# Patient Record
Sex: MALE | Race: WHITE | NOT HISPANIC OR LATINO | Employment: FULL TIME | URBAN - METROPOLITAN AREA
[De-identification: names, ages, dates, MRNs, and addresses within clinical notes are randomized per-mention and may not be internally consistent; named-entity substitution may affect disease eponyms.]

---

## 2017-04-06 ENCOUNTER — HOSPITAL ENCOUNTER (EMERGENCY)
Facility: HOSPITAL | Age: 27
Discharge: HOME | End: 2017-04-06
Attending: EMERGENCY MEDICINE | Admitting: EMERGENCY MEDICINE
Payer: COMMERCIAL

## 2017-04-06 VITALS
OXYGEN SATURATION: 98 % | RESPIRATION RATE: 18 BRPM | TEMPERATURE: 97.9 F | DIASTOLIC BLOOD PRESSURE: 72 MMHG | SYSTOLIC BLOOD PRESSURE: 124 MMHG | WEIGHT: 183 LBS | HEART RATE: 74 BPM

## 2017-04-06 DIAGNOSIS — S05.02XA LEFT CORNEAL ABRASION, INITIAL ENCOUNTER: ICD-10-CM

## 2017-04-06 DIAGNOSIS — H16.133 UV KERATITIS, BILATERAL: Primary | ICD-10-CM

## 2017-04-06 PROCEDURE — 99283 EMERGENCY DEPT VISIT LOW MDM: CPT

## 2017-04-06 RX ORDER — PROPARACAINE HYDROCHLORIDE 5 MG/ML
1 SOLUTION/ DROPS OPHTHALMIC ONCE
Status: DISCONTINUED | OUTPATIENT
Start: 2017-04-06 | End: 2017-04-06 | Stop reason: HOSPADM

## 2017-04-06 RX ORDER — POLYMYXIN B SULFATE AND TRIMETHOPRIM 1; 10000 MG/ML; [USP'U]/ML
1 SOLUTION OPHTHALMIC EVERY 6 HOURS
Qty: 10 ML | Refills: 0 | Status: SHIPPED | OUTPATIENT
Start: 2017-04-06 | End: 2017-05-06

## 2017-09-21 ENCOUNTER — OFFICE VISIT (OUTPATIENT)
Dept: URGENT CARE | Age: 27
End: 2017-09-21
Payer: COMMERCIAL

## 2017-09-21 ENCOUNTER — TRANSCRIBE ORDERS (OUTPATIENT)
Dept: URGENT CARE | Age: 27
End: 2017-09-21

## 2017-09-21 ENCOUNTER — APPOINTMENT (OUTPATIENT)
Dept: RADIOLOGY | Age: 27
End: 2017-09-21
Attending: PHYSICIAN ASSISTANT
Payer: COMMERCIAL

## 2017-09-21 DIAGNOSIS — S69.92XA UNSPECIFIED INJURY OF LEFT WRIST, HAND AND FINGER(S), INITIAL ENCOUNTER: ICD-10-CM

## 2017-09-21 PROCEDURE — 73140 X-RAY EXAM OF FINGER(S): CPT

## 2017-09-21 PROCEDURE — 29130 APPL FINGER SPLINT STATIC: CPT | Performed by: FAMILY MEDICINE

## 2017-09-21 PROCEDURE — G0382 LEV 3 HOSP TYPE B ED VISIT: HCPCS | Performed by: FAMILY MEDICINE

## 2018-08-09 ENCOUNTER — HOSPITAL ENCOUNTER (OUTPATIENT)
Dept: RADIOLOGY | Facility: HOSPITAL | Age: 28
Discharge: HOME | End: 2018-08-09
Payer: COMMERCIAL

## 2018-08-09 VITALS — BODY MASS INDEX: 25.9 KG/M2 | HEIGHT: 71 IN | WEIGHT: 185 LBS

## 2018-08-09 DIAGNOSIS — M25.531 PAIN IN RIGHT WRIST: Primary | ICD-10-CM

## 2018-08-09 DIAGNOSIS — M25.531 PAIN IN RIGHT WRIST: ICD-10-CM

## 2018-08-09 PROCEDURE — 99203 OFFICE O/P NEW LOW 30 MIN: CPT | Performed by: PHYSICIAN ASSISTANT

## 2018-08-09 PROCEDURE — 73110 X-RAY EXAM OF WRIST: CPT

## 2018-08-09 RX ORDER — IBUPROFEN 200 MG
TABLET ORAL EVERY 6 HOURS PRN
COMMUNITY
End: 2021-03-11

## 2018-08-09 NOTE — PROGRESS NOTES
Assessment/Plan   Diagnoses and all orders for this visit:    Pain in right wrist  -     XR wrist 3+ vw right; Future  -     MRI wrist right wo contrast; Future    Other orders  -     ibuprofen (MOTRIN) 200 mg tablet; Take by mouth every 6 (six) hours as needed for mild pain    Wear brace for activity  Suspect acute TFCC tear - MRI, follow up with Dr Johnny Schmitt / Julio Perales   Patient ID: Fuad Lopez is a 33 y o  male  There were no vitals filed for this visit  34yo male comes in for an evaluation of his right wrist   He was originally injured a week ago when he was lifting heavy pillars from his wedding  He felt a sudden pain in the volar wrist that radiated up to the forearm  He is unable to make a full fist   The pain is dull in character, mild in severity, pain does not radiate and is not associated with numbness  He c/o volar & lateral clicking sensations with wrist movement  The following portions of the patient's history were reviewed and updated as appropriate: allergies, current medications, past family history, past medical history, past social history, past surgical history and problem list     Review of Systems  Ortho Exam  History reviewed  No pertinent past medical history  Past Surgical History:   Procedure Laterality Date    ANKLE SURGERY Bilateral     KNEE SURGERY Right      History reviewed  No pertinent family history  Social History     Occupational History    Not on file  Social History Main Topics    Smoking status: Never Smoker    Smokeless tobacco: Never Used    Alcohol use Yes      Comment: social    Drug use: No    Sexual activity: Not on file       Review of Systems   Constitutional: Negative  HENT: Negative  Eyes: Negative  Respiratory: Negative  Cardiovascular: Negative  Gastrointestinal: Negative  Endocrine: Negative  Genitourinary: Negative  Musculoskeletal: As below      Allergic/Immunologic: Negative  Neurological: Negative  Hematological: Negative  Psychiatric/Behavioral: Negative  Objective   Physical Exam    · Constitutional: Awake, Alert, Oriented  · Eyes: EOMI  · Psych: Mood and affect appropriate  · Heart: regular rate and rhythm  · Lungs: No audible wheezing  · Abdomen: soft  · Lymph: no lymphedema   right wrist:  - Appearance   Swelling: mild volar  No ecchymosis  - Palpation  o No tenderness to palpation of distal radius,scaphoid, lunate, hamate, ulnar wrist, dorsal wrist, palmar wrist, thenar eminence, hypothenar eminence, or hand  ++ tenderness over the ulnar styloid and TFCC  - ROM  o palmar flexion 30, dorsiflexion 30, pronation 90, supination 90  - Motor  o  5/5, wrist extension 5/5, and wrist flexion 5/5, interossi 5/5  - Special Tests  o normal sensation of hand and arm  - NVI distally    I have personally reviewed pertinent films in PACS and my interpretation is no fracture

## 2018-08-10 DIAGNOSIS — M25.531 WRIST PAIN, RIGHT: Primary | ICD-10-CM

## 2018-08-15 ENCOUNTER — HOSPITAL ENCOUNTER (OUTPATIENT)
Dept: RADIOLOGY | Age: 28
Discharge: HOME | End: 2018-08-15
Payer: COMMERCIAL

## 2018-08-15 ENCOUNTER — APPOINTMENT (OUTPATIENT)
Dept: RADIOLOGY | Age: 28
End: 2018-08-15
Payer: COMMERCIAL

## 2018-08-15 DIAGNOSIS — M25.531 PAIN IN RIGHT WRIST: ICD-10-CM

## 2018-08-15 DIAGNOSIS — M25.531 WRIST PAIN, RIGHT: ICD-10-CM

## 2018-08-15 PROCEDURE — 70030 X-RAY EYE FOR FOREIGN BODY: CPT

## 2018-08-15 PROCEDURE — 73221 MRI JOINT UPR EXTREM W/O DYE: CPT

## 2018-08-17 ENCOUNTER — OFFICE VISIT (OUTPATIENT)
Dept: OCCUPATIONAL THERAPY | Facility: HOSPITAL | Age: 28
End: 2018-08-17
Attending: ORTHOPAEDIC SURGERY
Payer: COMMERCIAL

## 2018-08-17 VITALS
HEIGHT: 71 IN | DIASTOLIC BLOOD PRESSURE: 75 MMHG | BODY MASS INDEX: 26.4 KG/M2 | HEART RATE: 76 BPM | WEIGHT: 188.6 LBS | SYSTOLIC BLOOD PRESSURE: 122 MMHG

## 2018-08-17 DIAGNOSIS — R22.31 MASS OF RIGHT WRIST: ICD-10-CM

## 2018-08-17 DIAGNOSIS — R22.31 MASS OF RIGHT WRIST: Primary | ICD-10-CM

## 2018-08-17 PROCEDURE — G8992 OTHER PT/OT  D/C STATUS: HCPCS | Performed by: OCCUPATIONAL THERAPIST

## 2018-08-17 PROCEDURE — G8990 OTHER PT/OT CURRENT STATUS: HCPCS | Performed by: OCCUPATIONAL THERAPIST

## 2018-08-17 PROCEDURE — 99213 OFFICE O/P EST LOW 20 MIN: CPT | Performed by: ORTHOPAEDIC SURGERY

## 2018-08-17 PROCEDURE — G8991 OTHER PT/OT GOAL STATUS: HCPCS | Performed by: OCCUPATIONAL THERAPIST

## 2018-08-17 PROCEDURE — L3808 WHFO, RIGID W/O JOINTS: HCPCS | Performed by: OCCUPATIONAL THERAPIST

## 2018-08-17 NOTE — LETTER
August 17, 2018     Patient: Jc Arrington   YOB: 1990   Date of Visit: 8/17/2018       To Whom it May Concern:    Jc Arrington is under my professional care  He was seen in my office on 8/17/2018  He may return to work with limitations patient can return to work without use of his right hand       If you have any questions or concerns, please don't hesitate to call           Sincerely,          Stan Aguayo MD        CC: No Recipients

## 2018-08-17 NOTE — PROGRESS NOTES
ASSESSMENT/PLAN:    Assessment:   Mass of Right wrist within the carpal tunnel    Plan: We will order the patient an MRI with contrast to evaluate the mass and a dynamic ultrasound to evaluate the mass as well  The patient will meet with the therapist today with an intrinsic plus splint for his right hand  He may return to work without the use of his right hand for approximately the next 3 weeks  Follow Up: After Testing    To Do Next Visit:       General Discussions:       Operative Discussions:         _____________________________________________________  CHIEF COMPLAINT:  Chief Complaint   Patient presents with    Right Wrist - Pain         SUBJECTIVE:  Austin Ortiz is a 33y o  year old male who presents with Pain  Moderate  Constant  Sharp, Burning and Aching, tingling and weakness to the right wrist   This started  2 week(s) ago as Due to a personal injury  Pt states that he went to  something heavy from his wedding and felt a pop and burning sensation to the volar aspect of his right wrist  Pt states that now whenever he makes a fist he feels a popping or clicking over the carpal tunnel  Pt is a  and states that he is has been at work this whole time without restrictions  Radiation: Yes to the  forearm  Previous Treatments: NSAIDs and ice with only partial relief  Associated symptoms: No Complaints    PAST MEDICAL HISTORY:  No past medical history on file  PAST SURGICAL HISTORY:  Past Surgical History:   Procedure Laterality Date    ANKLE SURGERY Bilateral     KNEE SURGERY Right        FAMILY HISTORY:  No family history on file      SOCIAL HISTORY:  Social History   Substance Use Topics    Smoking status: Never Smoker    Smokeless tobacco: Never Used    Alcohol use Yes      Comment: social       MEDICATIONS:    Current Outpatient Prescriptions:     ibuprofen (MOTRIN) 200 mg tablet, Take by mouth every 6 (six) hours as needed for mild pain, Disp: , Rfl:     ALLERGIES:  No Known Allergies    REVIEW OF SYSTEMS:  Pertinent items are noted in HPI      LABS:  HgA1c: No results found for: HGBA1C  BMP: No results found for: GLUCOSE, CALCIUM, NA, K, CO2, CL, BUN, CREATININE      _____________________________________________________  PHYSICAL EXAMINATION:  General: well developed and well nourished, alert, oriented times 3 and appears comfortable  Psychiatric: Normal  HEENT: Trachea Midline, No torticollis  Cardiovascular: No discernable arrhythmia  Pulmonary: No wheezing or stridor  Skin: ecchymosis to the ulnar aspect of right forearm   Neurovascular: Sensation Intact to the Median, Ulnar, Radial Nerve, Motor Intact to the Median, Ulnar, Radial Nerve and Pulses Intact    MUSCULOSKELETAL EXAMINATION:  RIGHT SIDE:  Wrist:  full ROM, no instability, subluxation of flexor tendons are the median nerve, audible popping, positive tinels    _____________________________________________________  STUDIES REVIEWED:  Images were reviewd in PACS: MRI of right wrist shows a mass within the carpal tunnel      PROCEDURES PERFORMED:  Procedures  No Procedures performed today

## 2018-08-17 NOTE — PROGRESS NOTES
Splint     Diagnosis:   1  Mass of right wrist  Ambulatory referral to PT/OT hand therapy   2Indication: pain    Location: Right  wrist, hand, thumb, index finger, long finger, ring finger and small finger  Supplies: Custom Fit Orthotic  Splint type: Ulnar Gutter Hand Based  Wearing Schedule: Remove for hygiene only  Describe Position: intrinsic plus    Precautions: Universal (skin contact/breakdown)    Patient or Caregiver expresses understanding of wearing Schedule and Precautions? Yes  Patient or Caregiver able to don/doff orthotic independently? Yes    Written orders provided to patient?  Yes  Orders Obtained: Written  Orders Obtained from: Dr Kenn Carter    Return for evaluation and treatment No

## 2018-08-20 ENCOUNTER — APPOINTMENT (OUTPATIENT)
Dept: LAB | Facility: HOSPITAL | Age: 28
End: 2018-08-20
Payer: COMMERCIAL

## 2018-08-20 ENCOUNTER — HOSPITAL ENCOUNTER (OUTPATIENT)
Dept: RADIOLOGY | Facility: HOSPITAL | Age: 28
Discharge: HOME | End: 2018-08-20
Attending: ORTHOPAEDIC SURGERY
Payer: COMMERCIAL

## 2018-08-20 ENCOUNTER — TRANSCRIBE ORDERS (OUTPATIENT)
Dept: RADIOLOGY | Facility: HOSPITAL | Age: 28
End: 2018-08-20

## 2018-08-20 ENCOUNTER — HOSPITAL ENCOUNTER (OUTPATIENT)
Dept: RADIOLOGY | Facility: HOSPITAL | Age: 28
Discharge: HOME | End: 2018-08-20
Attending: ORTHOPAEDIC SURGERY | Admitting: RADIOLOGY
Payer: COMMERCIAL

## 2018-08-20 DIAGNOSIS — Z00.8 HEALTH EXAMINATION IN POPULATION SURVEY: Primary | ICD-10-CM

## 2018-08-20 DIAGNOSIS — R22.31 MASS OF RIGHT WRIST: ICD-10-CM

## 2018-08-20 DIAGNOSIS — Z00.8 HEALTH EXAMINATION IN POPULATION SURVEY: ICD-10-CM

## 2018-08-20 LAB
CHOLEST SERPL-MCNC: 163 MG/DL (ref 50–200)
EST. AVERAGE GLUCOSE BLD GHB EST-MCNC: 103 MG/DL
HBA1C MFR BLD: 5.2 % (ref 4.2–6.3)
HDLC SERPL-MCNC: 65 MG/DL (ref 40–60)
LDLC SERPL CALC-MCNC: 86 MG/DL (ref 0–100)
NONHDLC SERPL-MCNC: 98 MG/DL
TRIGL SERPL-MCNC: 62 MG/DL

## 2018-08-20 PROCEDURE — A9585 GADOBUTROL INJECTION: HCPCS | Performed by: ORTHOPAEDIC SURGERY

## 2018-08-20 PROCEDURE — 80061 LIPID PANEL: CPT

## 2018-08-20 PROCEDURE — 73223 MRI JOINT UPR EXTR W/O&W/DYE: CPT

## 2018-08-20 PROCEDURE — 36415 COLL VENOUS BLD VENIPUNCTURE: CPT

## 2018-08-20 PROCEDURE — 76882 US LMTD JT/FCL EVL NVASC XTR: CPT

## 2018-08-20 PROCEDURE — 83036 HEMOGLOBIN GLYCOSYLATED A1C: CPT

## 2018-08-20 RX ADMIN — GADOBUTROL 10 ML: 604.72 INJECTION INTRAVENOUS at 12:46

## 2018-09-07 ENCOUNTER — OFFICE VISIT (OUTPATIENT)
Dept: OBGYN CLINIC | Facility: HOSPITAL | Age: 28
End: 2018-09-07
Payer: COMMERCIAL

## 2018-09-07 ENCOUNTER — DOCUMENTATION (OUTPATIENT)
Dept: OBGYN CLINIC | Facility: HOSPITAL | Age: 28
End: 2018-09-07

## 2018-09-07 VITALS
HEART RATE: 84 BPM | HEIGHT: 71 IN | WEIGHT: 190.8 LBS | SYSTOLIC BLOOD PRESSURE: 118 MMHG | DIASTOLIC BLOOD PRESSURE: 72 MMHG | BODY MASS INDEX: 26.71 KG/M2

## 2018-09-07 DIAGNOSIS — R22.31 MASS OF RIGHT WRIST: Primary | ICD-10-CM

## 2018-09-07 PROCEDURE — 99214 OFFICE O/P EST MOD 30 MIN: CPT | Performed by: ORTHOPAEDIC SURGERY

## 2018-09-07 NOTE — H&P
ASSESSMENT/PLAN:    Assessment:   Right wrist mass in carpal tunnel    Plan:   Surgery for mass excision from right wrist   Patient will be done under a open incision with regards to his right hand under general anesthesia  To Do Next Visit:  Remove sutures    General Discussions:       Operative Discussions:  Standard Consent: The risks and benefits of the procedure were explained to the patient, which include, but are not limited to: Bleeding, infection, recurrence, pain, scar, damage to tendons, damage to nerves, and damage to blood vessels, failure to give desired results and complications related to anesthesia  These risks, along with alternative conservative treatment options, and postoperative protocols were voiced back and understood by the patient  All questions were answered to the patient's satisfaction  The patient agrees to comply with a standard postoperative protocol, and is willing to proceed  Education was provided via written and auditory forms  There were no barriers to learning  Written handouts regarding wound care, incision and scar care, and general preoperative information was provided to the patient  Prior to surgery, the patient may be requested to stop all anti-inflammatory medications  Prophylactic aspirin, Plavix, and Coumadin may be allowed to be continued  Medications including vitamin E , ginkgo, and fish oil are requested to be stopped approximately one week prior to surgery  Hypertensive medications and beta blockers, if taken, should be continued  _____________________________________________________  CHIEF COMPLAINT:  Chief Complaint   Patient presents with    Right Wrist - Follow-up         SUBJECTIVE:  Yash Farnsworth is a 33y o  year old male who presents to the office for follow up of right wrist pain  He is here today to review the results of his right wrist MRI   He has been wearing a removable wrist splint which has been beneficial  When not wearing his splint, he experiences popping and clicking with associated pain  He is accompanied by his wife today in the office  PAST MEDICAL HISTORY:  History reviewed  No pertinent past medical history  PAST SURGICAL HISTORY:  Past Surgical History:   Procedure Laterality Date    ANKLE SURGERY Bilateral     KNEE SURGERY Right        FAMILY HISTORY:  Family History   Problem Relation Age of Onset    No Known Problems Mother     No Known Problems Father        SOCIAL HISTORY:  Social History   Substance Use Topics    Smoking status: Never Smoker    Smokeless tobacco: Never Used    Alcohol use Yes      Comment: social       MEDICATIONS:    Current Outpatient Prescriptions:     Acetaminophen (TYLENOL PO), Take by mouth, Disp: , Rfl:     ibuprofen (MOTRIN) 200 mg tablet, Take by mouth every 6 (six) hours as needed for mild pain, Disp: , Rfl:     ALLERGIES:  No Known Allergies    REVIEW OF SYSTEMS:  Pertinent items are noted in HPI  A comprehensive review of systems was negative      LABS:  HgA1c:   Lab Results   Component Value Date    HGBA1C 5 2 08/20/2018     BMP: No results found for: GLUCOSE, CALCIUM, NA, K, CO2, CL, BUN, CREATININE    _____________________________________________________  PHYSICAL EXAMINATION:  General: well developed and well nourished, alert, oriented times 3 and appears comfortable  Psychiatric: Normal  HEENT: Trachea Midline, No torticollis  Cardiovascular: No discernable arrhythmia  Pulmonary: No wheezing or stridor  Skin: No masses, erthema, lacerations, fluctation, ulcerations  Neurovascular: Sensation Intact to the Median, Ulnar, Radial Nerve, Motor Intact to the Median, Ulnar, Radial Nerve and Pulses Intact    MUSCULOSKELETAL EXAMINATION:  Right wrist:  Tinel's positive at wrist  Snapping flexor tendons  Abductor brevis intact    _____________________________________________________  STUDIES REVIEWED:  Images were reviewd in PACS:  MRI of right wrist shows mass in distal carpal tunnel  US of right wrist shows 6 mm mass in carpal tunnel surrounded by flexor digitorum tendons      PROCEDURES PERFORMED:  Procedures  No Procedures performed today    Scribe Attestation    I,:   Wanda Milligan am acting as a scribe while in the presence of the attending physician :        I,:   Chelle Javier MD personally performed the services described in this documentation    as scribed in my presence :

## 2018-09-07 NOTE — LETTER
September 7, 2018     Patient: Austin Ortiz   YOB: 1990   Date of Visit: 9/7/2018       To Whom it May Concern:    Austin Ortiz is under my professional care  He was seen in my office on 9/7/2018  He will remain out of work starting on 09/20/2018 until he is cleared by his physician postoperatively  If you have any questions or concerns, please don't hesitate to call           Sincerely,          Fela Brice MD        CC: No Recipients

## 2018-09-07 NOTE — PROGRESS NOTES
ASSESSMENT/PLAN:    Assessment:   Right wrist mass in carpal tunnel    Plan:   Surgery for mass excision from right wrist   Patient will be done under a open incision with regards to his right hand under general anesthesia  To Do Next Visit:  Remove sutures    General Discussions:       Operative Discussions:  Standard Consent: The risks and benefits of the procedure were explained to the patient, which include, but are not limited to: Bleeding, infection, recurrence, pain, scar, damage to tendons, damage to nerves, and damage to blood vessels, failure to give desired results and complications related to anesthesia  These risks, along with alternative conservative treatment options, and postoperative protocols were voiced back and understood by the patient  All questions were answered to the patient's satisfaction  The patient agrees to comply with a standard postoperative protocol, and is willing to proceed  Education was provided via written and auditory forms  There were no barriers to learning  Written handouts regarding wound care, incision and scar care, and general preoperative information was provided to the patient  Prior to surgery, the patient may be requested to stop all anti-inflammatory medications  Prophylactic aspirin, Plavix, and Coumadin may be allowed to be continued  Medications including vitamin E , ginkgo, and fish oil are requested to be stopped approximately one week prior to surgery  Hypertensive medications and beta blockers, if taken, should be continued  _____________________________________________________  CHIEF COMPLAINT:  Chief Complaint   Patient presents with    Right Wrist - Follow-up         SUBJECTIVE:  Asia Chauhan is a 33y o  year old male who presents to the office for follow up of right wrist pain  He is here today to review the results of his right wrist MRI   He has been wearing a removable wrist splint which has been beneficial  When not wearing his splint, he experiences popping and clicking with associated pain  He is accompanied by his wife today in the office  PAST MEDICAL HISTORY:  History reviewed  No pertinent past medical history  PAST SURGICAL HISTORY:  Past Surgical History:   Procedure Laterality Date    ANKLE SURGERY Bilateral     KNEE SURGERY Right        FAMILY HISTORY:  Family History   Problem Relation Age of Onset    No Known Problems Mother     No Known Problems Father        SOCIAL HISTORY:  Social History   Substance Use Topics    Smoking status: Never Smoker    Smokeless tobacco: Never Used    Alcohol use Yes      Comment: social       MEDICATIONS:    Current Outpatient Prescriptions:     Acetaminophen (TYLENOL PO), Take by mouth, Disp: , Rfl:     ibuprofen (MOTRIN) 200 mg tablet, Take by mouth every 6 (six) hours as needed for mild pain, Disp: , Rfl:     ALLERGIES:  No Known Allergies    REVIEW OF SYSTEMS:  Pertinent items are noted in HPI  A comprehensive review of systems was negative      LABS:  HgA1c:   Lab Results   Component Value Date    HGBA1C 5 2 08/20/2018     BMP: No results found for: GLUCOSE, CALCIUM, NA, K, CO2, CL, BUN, CREATININE    _____________________________________________________  PHYSICAL EXAMINATION:  General: well developed and well nourished, alert, oriented times 3 and appears comfortable  Psychiatric: Normal  HEENT: Trachea Midline, No torticollis  Cardiovascular: No discernable arrhythmia  Pulmonary: No wheezing or stridor  Skin: No masses, erthema, lacerations, fluctation, ulcerations  Neurovascular: Sensation Intact to the Median, Ulnar, Radial Nerve, Motor Intact to the Median, Ulnar, Radial Nerve and Pulses Intact    MUSCULOSKELETAL EXAMINATION:  Right wrist:  Tinel's positive at wrist  Snapping flexor tendons  Abductor brevis intact    _____________________________________________________  STUDIES REVIEWED:  Images were reviewd in PACS:  MRI of right wrist shows mass in distal carpal tunnel  US of right wrist shows 6 mm mass in carpal tunnel surrounded by flexor digitorum tendons      PROCEDURES PERFORMED:  Procedures  No Procedures performed today    Scribe Attestation    I,:   Benjamin Gilliland am acting as a scribe while in the presence of the attending physician :        I,:   Noble Abebe MD personally performed the services described in this documentation    as scribed in my presence :

## 2018-09-12 NOTE — PRE-PROCEDURE INSTRUCTIONS
Pre-Surgery Instructions:   Medication Instructions    Acetaminophen (TYLENOL PO) Patient was instructed by Physician and understands   DAILY MULTIPLE VITAMINS PO Patient was instructed by Physician and understands   ibuprofen (MOTRIN) 200 mg tablet Patient was instructed by Physician and understands  Before your operation, you play an important role in decreasing your risk for infection by washing with special antiseptic soap  This is an effective way to reduce bacteria on the skin which may help to prevent infections at the surgical site  Please read the following directions in advance  1  In the week before your operation purchase a 4 ounce bottle of antiseptic soap containing chlorhexidine gluconate 4%  Some brand names include: Aplicare, Endure, and Hibiclens  The cost is usually less than $5 00  · For your convenience, the [address] [place] [address] carries the soap  · It may also be available at your doctor's office or pre-admission testing center, and at most retail pharmacies  · If you are allergic or sensitive to soaps containing chlorhexidine gluconate (CHG), please let your doctor know so another antiseptic soap can be suggested  · CHG antiseptic soap is for external use only  2      The day before your operation follow these directions carefully to get ready  · Place clean lines (sheets) on your bed; you should sleep on clean sheets after your evening shower  · Get clean towels and washcloths ready - you need enough for 2 showers  · Set aside clean underwear, pajamas, and clothing to wear after the shower  Reminders:  · DO NOT use any other soap or body rinse on your skin during or after the antiseptic showers  · DO NOT use lotion , powder, deodorant, or perfume/aftershave of any kind on your skin after your antiseptic shower  · DO NOT shave any body parts in the 24 hours/the day before your operation    · DO NOT get the antiseptic soap in your eyes, ears, nose, mouth, or vaginal area  3      You will need to shower the night before AND the morning of your Surgery  Shower 1:  · The evening before your operation, take the fist shower  · First, shampoo your hair with regular shampoo and rinse it completely before you use the anitseptic soap  After washing and rinsing your hair, rinse your body  · Next, use a clean wash cloth to apply the antiseptic soap and wash your body from the neck down to your toes using 1/2 bottle of the antiseptic soap  You will use the other 1/2 bottle for the second shower  · Clean the area where your incision will be; later this area well for about 2 minutes  · If you ar having head or neck surgery, wash areas with the antiseptic soap  · Rinse yourself completely with running water  · Use a clean towel to dry off  · Wear clean underwear and clothing/pajamas  Shower 2:  · The Morning of your operation, take the second shower following the same steps as Shower 1 using the second 1/2 of the bottle of antiseptic soap  · Use clean cloths and towels to was and dry yourself off  · Wear clean underwear and clothing

## 2018-09-20 ENCOUNTER — ANESTHESIA (OUTPATIENT)
Dept: PERIOP | Facility: HOSPITAL | Age: 28
End: 2018-09-20
Payer: COMMERCIAL

## 2018-09-20 ENCOUNTER — HOSPITAL ENCOUNTER (OUTPATIENT)
Facility: HOSPITAL | Age: 28
Setting detail: OUTPATIENT SURGERY
Discharge: HOME | End: 2018-09-20
Attending: ORTHOPAEDIC SURGERY | Admitting: ORTHOPAEDIC SURGERY
Payer: COMMERCIAL

## 2018-09-20 ENCOUNTER — ANESTHESIA EVENT (OUTPATIENT)
Dept: PERIOP | Facility: HOSPITAL | Age: 28
End: 2018-09-20
Payer: COMMERCIAL

## 2018-09-20 VITALS
BODY MASS INDEX: 25.9 KG/M2 | HEART RATE: 60 BPM | RESPIRATION RATE: 11 BRPM | HEIGHT: 71 IN | OXYGEN SATURATION: 99 % | SYSTOLIC BLOOD PRESSURE: 131 MMHG | TEMPERATURE: 98.8 F | WEIGHT: 185 LBS | DIASTOLIC BLOOD PRESSURE: 79 MMHG

## 2018-09-20 DIAGNOSIS — M65.9 TENOSYNOVITIS OF RIGHT WRIST: Primary | ICD-10-CM

## 2018-09-20 DIAGNOSIS — R22.31 MASS OF RIGHT WRIST: ICD-10-CM

## 2018-09-20 PROBLEM — M65.931 TENOSYNOVITIS OF RIGHT WRIST: Status: ACTIVE | Noted: 2018-09-07

## 2018-09-20 PROCEDURE — 25115 REMOVE WRIST/FOREARM LESION: CPT | Performed by: ORTHOPAEDIC SURGERY

## 2018-09-20 PROCEDURE — 87116 MYCOBACTERIA CULTURE: CPT | Performed by: ORTHOPAEDIC SURGERY

## 2018-09-20 PROCEDURE — 87075 CULTR BACTERIA EXCEPT BLOOD: CPT | Performed by: ORTHOPAEDIC SURGERY

## 2018-09-20 PROCEDURE — 87206 SMEAR FLUORESCENT/ACID STAI: CPT | Performed by: ORTHOPAEDIC SURGERY

## 2018-09-20 PROCEDURE — 88307 TISSUE EXAM BY PATHOLOGIST: CPT | Performed by: PATHOLOGY

## 2018-09-20 PROCEDURE — 87102 FUNGUS ISOLATION CULTURE: CPT | Performed by: ORTHOPAEDIC SURGERY

## 2018-09-20 PROCEDURE — 87205 SMEAR GRAM STAIN: CPT | Performed by: ORTHOPAEDIC SURGERY

## 2018-09-20 PROCEDURE — 87070 CULTURE OTHR SPECIMN AEROBIC: CPT | Performed by: ORTHOPAEDIC SURGERY

## 2018-09-20 PROCEDURE — 87176 TISSUE HOMOGENIZATION CULTR: CPT | Performed by: ORTHOPAEDIC SURGERY

## 2018-09-20 RX ORDER — LIDOCAINE HYDROCHLORIDE AND EPINEPHRINE 10; 10 MG/ML; UG/ML
INJECTION, SOLUTION INFILTRATION; PERINEURAL AS NEEDED
Status: DISCONTINUED | OUTPATIENT
Start: 2018-09-20 | End: 2018-09-20 | Stop reason: HOSPADM

## 2018-09-20 RX ORDER — FENTANYL CITRATE/PF 50 MCG/ML
25 SYRINGE (ML) INJECTION
Status: DISCONTINUED | OUTPATIENT
Start: 2018-09-20 | End: 2018-09-20 | Stop reason: HOSPADM

## 2018-09-20 RX ORDER — MIDAZOLAM HYDROCHLORIDE 1 MG/ML
INJECTION INTRAMUSCULAR; INTRAVENOUS AS NEEDED
Status: DISCONTINUED | OUTPATIENT
Start: 2018-09-20 | End: 2018-09-20 | Stop reason: SURG

## 2018-09-20 RX ORDER — GLYCOPYRROLATE 0.2 MG/ML
INJECTION INTRAMUSCULAR; INTRAVENOUS AS NEEDED
Status: DISCONTINUED | OUTPATIENT
Start: 2018-09-20 | End: 2018-09-20 | Stop reason: SURG

## 2018-09-20 RX ORDER — HYDRALAZINE HYDROCHLORIDE 20 MG/ML
5 INJECTION INTRAMUSCULAR; INTRAVENOUS AS NEEDED
Status: DISCONTINUED | OUTPATIENT
Start: 2018-09-20 | End: 2018-09-20 | Stop reason: HOSPADM

## 2018-09-20 RX ORDER — LABETALOL HYDROCHLORIDE 5 MG/ML
5 INJECTION, SOLUTION INTRAVENOUS AS NEEDED
Status: DISCONTINUED | OUTPATIENT
Start: 2018-09-20 | End: 2018-09-20 | Stop reason: HOSPADM

## 2018-09-20 RX ORDER — HYDROCODONE BITARTRATE AND ACETAMINOPHEN 5; 325 MG/1; MG/1
1 TABLET ORAL EVERY 6 HOURS PRN
Qty: 10 TABLET | Refills: 0 | Status: SHIPPED | OUTPATIENT
Start: 2018-09-20 | End: 2018-10-12 | Stop reason: HOSPADM

## 2018-09-20 RX ORDER — PROMETHAZINE HYDROCHLORIDE 25 MG/ML
6.25 INJECTION, SOLUTION INTRAMUSCULAR; INTRAVENOUS ONCE AS NEEDED
Status: DISCONTINUED | OUTPATIENT
Start: 2018-09-20 | End: 2018-09-20 | Stop reason: HOSPADM

## 2018-09-20 RX ORDER — METOCLOPRAMIDE HYDROCHLORIDE 5 MG/ML
10 INJECTION INTRAMUSCULAR; INTRAVENOUS ONCE AS NEEDED
Status: DISCONTINUED | OUTPATIENT
Start: 2018-09-20 | End: 2018-09-20

## 2018-09-20 RX ORDER — METOCLOPRAMIDE HYDROCHLORIDE 5 MG/ML
10 INJECTION INTRAMUSCULAR; INTRAVENOUS ONCE AS NEEDED
Status: COMPLETED | OUTPATIENT
Start: 2018-09-20 | End: 2018-09-20

## 2018-09-20 RX ORDER — ONDANSETRON 4 MG/1
4 TABLET, FILM COATED ORAL EVERY 8 HOURS PRN
Qty: 20 TABLET | Refills: 0 | Status: SHIPPED | OUTPATIENT
Start: 2018-09-20 | End: 2018-10-12 | Stop reason: HOSPADM

## 2018-09-20 RX ORDER — FENTANYL CITRATE 50 UG/ML
INJECTION, SOLUTION INTRAMUSCULAR; INTRAVENOUS AS NEEDED
Status: DISCONTINUED | OUTPATIENT
Start: 2018-09-20 | End: 2018-09-20 | Stop reason: SURG

## 2018-09-20 RX ORDER — ONDANSETRON 2 MG/ML
4 INJECTION INTRAMUSCULAR; INTRAVENOUS ONCE AS NEEDED
Status: COMPLETED | OUTPATIENT
Start: 2018-09-20 | End: 2018-09-20

## 2018-09-20 RX ORDER — MEPERIDINE HYDROCHLORIDE 50 MG/ML
12.5 INJECTION INTRAMUSCULAR; INTRAVENOUS; SUBCUTANEOUS
Status: DISCONTINUED | OUTPATIENT
Start: 2018-09-20 | End: 2018-09-20 | Stop reason: HOSPADM

## 2018-09-20 RX ORDER — HYDROCODONE BITARTRATE AND ACETAMINOPHEN 5; 325 MG/1; MG/1
1 TABLET ORAL ONCE AS NEEDED
Status: COMPLETED | OUTPATIENT
Start: 2018-09-20 | End: 2018-09-20

## 2018-09-20 RX ORDER — ONDANSETRON 2 MG/ML
INJECTION INTRAMUSCULAR; INTRAVENOUS AS NEEDED
Status: DISCONTINUED | OUTPATIENT
Start: 2018-09-20 | End: 2018-09-20 | Stop reason: SURG

## 2018-09-20 RX ORDER — SODIUM CHLORIDE, SODIUM LACTATE, POTASSIUM CHLORIDE, CALCIUM CHLORIDE 600; 310; 30; 20 MG/100ML; MG/100ML; MG/100ML; MG/100ML
75 INJECTION, SOLUTION INTRAVENOUS CONTINUOUS
Status: DISCONTINUED | OUTPATIENT
Start: 2018-09-20 | End: 2018-09-20 | Stop reason: HOSPADM

## 2018-09-20 RX ORDER — PROPOFOL 10 MG/ML
INJECTION, EMULSION INTRAVENOUS AS NEEDED
Status: DISCONTINUED | OUTPATIENT
Start: 2018-09-20 | End: 2018-09-20 | Stop reason: SURG

## 2018-09-20 RX ORDER — MAGNESIUM HYDROXIDE 1200 MG/15ML
LIQUID ORAL AS NEEDED
Status: DISCONTINUED | OUTPATIENT
Start: 2018-09-20 | End: 2018-09-20 | Stop reason: HOSPADM

## 2018-09-20 RX ADMIN — PROPOFOL 200 MG: 10 INJECTION, EMULSION INTRAVENOUS at 14:10

## 2018-09-20 RX ADMIN — FENTANYL CITRATE 25 MCG: 50 INJECTION, SOLUTION INTRAMUSCULAR; INTRAVENOUS at 15:35

## 2018-09-20 RX ADMIN — FENTANYL CITRATE 25 MCG: 50 INJECTION, SOLUTION INTRAMUSCULAR; INTRAVENOUS at 15:53

## 2018-09-20 RX ADMIN — FENTANYL CITRATE 25 MCG: 50 INJECTION, SOLUTION INTRAMUSCULAR; INTRAVENOUS at 15:24

## 2018-09-20 RX ADMIN — PROPOFOL 20 MG: 10 INJECTION, EMULSION INTRAVENOUS at 14:24

## 2018-09-20 RX ADMIN — FENTANYL CITRATE 25 MCG: 50 INJECTION, SOLUTION INTRAMUSCULAR; INTRAVENOUS at 14:26

## 2018-09-20 RX ADMIN — PROPOFOL 20 MG: 10 INJECTION, EMULSION INTRAVENOUS at 14:26

## 2018-09-20 RX ADMIN — FENTANYL CITRATE 25 MCG: 50 INJECTION, SOLUTION INTRAMUSCULAR; INTRAVENOUS at 15:41

## 2018-09-20 RX ADMIN — CEFAZOLIN SODIUM 2000 MG: 2 SOLUTION INTRAVENOUS at 14:15

## 2018-09-20 RX ADMIN — PROPOFOL 30 MG: 10 INJECTION, EMULSION INTRAVENOUS at 14:28

## 2018-09-20 RX ADMIN — FENTANYL CITRATE 50 MCG: 50 INJECTION, SOLUTION INTRAMUSCULAR; INTRAVENOUS at 15:30

## 2018-09-20 RX ADMIN — SODIUM CHLORIDE, SODIUM LACTATE, POTASSIUM CHLORIDE, AND CALCIUM CHLORIDE 75 ML/HR: .6; .31; .03; .02 INJECTION, SOLUTION INTRAVENOUS at 12:36

## 2018-09-20 RX ADMIN — GLYCOPYRROLATE 0.2 MG: 0.2 INJECTION, SOLUTION INTRAMUSCULAR; INTRAVENOUS at 14:07

## 2018-09-20 RX ADMIN — PROPOFOL 30 MG: 10 INJECTION, EMULSION INTRAVENOUS at 14:11

## 2018-09-20 RX ADMIN — ONDANSETRON 4 MG: 2 INJECTION INTRAMUSCULAR; INTRAVENOUS at 16:24

## 2018-09-20 RX ADMIN — HYDROCODONE BITARTRATE AND ACETAMINOPHEN 1 TABLET: 5; 325 TABLET ORAL at 16:40

## 2018-09-20 RX ADMIN — METOCLOPRAMIDE 10 MG: 5 INJECTION, SOLUTION INTRAMUSCULAR; INTRAVENOUS at 16:56

## 2018-09-20 RX ADMIN — FENTANYL CITRATE 25 MCG: 50 INJECTION, SOLUTION INTRAMUSCULAR; INTRAVENOUS at 14:47

## 2018-09-20 RX ADMIN — FENTANYL CITRATE 50 MCG: 50 INJECTION, SOLUTION INTRAMUSCULAR; INTRAVENOUS at 14:08

## 2018-09-20 RX ADMIN — ONDANSETRON 4 MG: 2 INJECTION INTRAMUSCULAR; INTRAVENOUS at 15:02

## 2018-09-20 RX ADMIN — MIDAZOLAM HYDROCHLORIDE 2 MG: 1 INJECTION, SOLUTION INTRAMUSCULAR; INTRAVENOUS at 14:04

## 2018-09-20 RX ADMIN — FENTANYL CITRATE 25 MCG: 50 INJECTION, SOLUTION INTRAMUSCULAR; INTRAVENOUS at 15:07

## 2018-09-20 NOTE — OP NOTE
OPERATIVE REPORT  PATIENT NAME: Jc Arrington  :  1990  MRN: [id number]  Pt Location: [facility]    SURGERY DATE: 18    Surgeon(s) and Role:     * Stan Aguayo MD - Primary     * Deysi Strong MD - Assisting    Pre-Op Diagnosis:  Mass of right wrist [R22 31]    Post-Op Diagnosis Codes:    Tenosynovitis right wrist    Procedure(s):  RELEASE CARPAL TUNNEL (Right)  TENOSYNOVECTOMY Flexor digitorum superficialis to index, long, ring and small finger; Flexor digitorum profundus to index, long, ring and small finger; Flexor pollicis longus (Right)    Specimen(s):    Order Name Source Comment Collection Info Order Time   ANAEROBIC CULTURE AND GRAM STAIN Mass  Collected By: Stan Aguayo MD 2018  2:59 PM   FUNGAL CULTURE Mass  Collected By: Stan Aguayo MD 2018  2:59 PM   CULTURE, TISSUE AND GRAM STAIN Mass  Collected By: Stan Aguayo MD 2018  2:59 PM   AFB CULTURE WITH STAIN Mass  Collected By: Stan Aguayo MD 2018  2:59 PM   TISSUE EXAM Mass  Collected By: Stan Aguayo MD 2018  2:59 PM       Estimated Blood Loss:   Minimal      Anesthesia Type:   * No anesthesia type entered *    Operative Indications: The patient has a history of numbness and tingling into the right hand and fingers with pain upon clenched fist formation with popping of mass around flexor tendons that was recalcitrant to conservative management  The decision was made to bring the patient to the operating room for right open carpal tunnel release with excision of mass  Risks of the procedure were explained which include, but are not limited to bleeding; infection; damage to nerves, arteries,veins, tendons; scar; pain; need for reoperation; failure to give desired result; and risks of anaesthesia  All questions were answered to satisfaction and they were willing to proceed           Operative Findings:  Extensive tenosynovitis surrounding the flexor digitorum superficialis tendons to the index, long, ring, and small finger as well as the flexor digitorum profundus tendons to the index, long, ring, and small finger as well as to the flexor pollicis longus    Complications:   None    Procedure and Technique:  After the patient, site, and procedure were identified, the patient was brought into the operating room in a supine position  General anaesthesia and local medication were provided  A well padded tourniquet was applied to the extremity, set at 250 mmHg  The  right upper extremity was then prepped and drapped in a normal, sterile, orthopedic fashion  After the patient, site, and procedure were identified attention was turned towards the right hand  At this point a curvilinear incision for standard open carpal tunnel release was then made in line with the radial aspect of the ring finger and extended at a curvilinear approach with a Mark incision at the wrist and extending along the distal aspect of the forearm  We dissected down through the skin and subcutaneous tissues with care taken to protect superficial neurovascular structures  Care was taken to protect palmar cutaneous branch of the median nerve  Antebrachial fascia was then opened and median nerves identified  We then sequentially dissected from proximal to distal releasing the carpal tunnel all the way to the level of the distal palmar fat  At this point, there was extensive tenosynovitis with extreme bogginess around the tendons noted with palpation of the flexor tendons out from around the median nerve  The median nerve was then meticulously retracted throughout the entirety of the procedure    Once this was done, and extensive tenosynovectomy was then performed removing a substantial amount of tenosynovium that was found surrounding the flexor pollicis longus, flexor digitorum superficialis tendons to the index, long, ring, and small finger as well as flexor digitorum profundus tendons to the index, long, ring, and small fingers  At this point, we had cleared off all of the tenosynovium around the flexor tendons  And this was thickened in areas creating what appeared to be the mass on the MRI  There were no further masses identified attached to the flexor tendons  This flexor tenosynovium was sent for culture as well as generalized tissue examination  We will schedule the patient for outpatient laboratory evaluation as well  At this point, the tourniquet was deflated  At the completion of the procedure, hemostasis was obtained with cautery and direct pressure  The wounds were copiously irrigated with sterile solution  The wounds were closed with Prolene  Sterile dressings were applied, including Xeroform, gauze, tweeners, webril, ACE  Please note, all sponge, needle, and instrument counts were correct prior to closure  Loupe magnification was utilized  The patient tolerated the procedure well       I was present for the entire procedure    Patient Disposition:  PACU , hemodynamically stable and extubated and stable    SIGNATURE: Marianna Gabreil MD  DATE: 09/20/18  TIME: 3:31 PM

## 2018-09-20 NOTE — ANESTHESIA PREPROCEDURE EVALUATION
Review of Systems/Medical History  Patient summary reviewed  Chart reviewed  History of anesthetic complications PONV    Cardiovascular  Negative cardio ROS Exercise tolerance (METS): >4,     Pulmonary  Negative pulmonary ROS        GI/Hepatic    GERD well controlled,   Comment: Very occasionally, no meds     Negative  ROS        Endo/Other  Negative endo/other ROS      GYN  Negative gynecology ROS          Hematology  Negative hematology ROS      Musculoskeletal  Negative musculoskeletal ROS        Neurology  Negative neurology ROS      Psychology   Negative psychology ROS              Physical Exam    Airway    Mallampati score: I  TM Distance: >3 FB  Neck ROM: full     Dental   No notable dental hx     Cardiovascular  Comment: Negative ROS, Rhythm: regular, Rate: normal, Cardiovascular exam normal    Pulmonary  Pulmonary exam normal Breath sounds clear to auscultation,     Other Findings        Anesthesia Plan  ASA Score- 1     Anesthesia Type- general with ASA Monitors  Additional Monitors:   Airway Plan: LMA  Plan Factors-    Induction- intravenous  Postoperative Plan- Plan for postoperative opioid use  Informed Consent- Anesthetic plan and risks discussed with patient

## 2018-09-20 NOTE — ANESTHESIA POSTPROCEDURE EVALUATION
Post-Op Assessment Note      CV Status:  Stable    Mental Status:  Alert and awake    Hydration Status:  Euvolemic    PONV Controlled:  Controlled    Airway Patency:  Patent    Post Op Vitals Reviewed: Yes          Staff: Anesthesiologist           /80 (09/20/18 1527)    Temp 97 9 °F (36 6 °C) (09/20/18 1527)    Pulse 66 (09/20/18 1527)   Resp 21 (09/20/18 1527)    SpO2

## 2018-09-20 NOTE — H&P (VIEW-ONLY)
ASSESSMENT/PLAN:    Assessment:   Right wrist mass in carpal tunnel    Plan:   Surgery for mass excision from right wrist   Patient will be done under a open incision with regards to his right hand under general anesthesia  To Do Next Visit:  Remove sutures    General Discussions:       Operative Discussions:  Standard Consent: The risks and benefits of the procedure were explained to the patient, which include, but are not limited to: Bleeding, infection, recurrence, pain, scar, damage to tendons, damage to nerves, and damage to blood vessels, failure to give desired results and complications related to anesthesia  These risks, along with alternative conservative treatment options, and postoperative protocols were voiced back and understood by the patient  All questions were answered to the patient's satisfaction  The patient agrees to comply with a standard postoperative protocol, and is willing to proceed  Education was provided via written and auditory forms  There were no barriers to learning  Written handouts regarding wound care, incision and scar care, and general preoperative information was provided to the patient  Prior to surgery, the patient may be requested to stop all anti-inflammatory medications  Prophylactic aspirin, Plavix, and Coumadin may be allowed to be continued  Medications including vitamin E , ginkgo, and fish oil are requested to be stopped approximately one week prior to surgery  Hypertensive medications and beta blockers, if taken, should be continued  _____________________________________________________  CHIEF COMPLAINT:  Chief Complaint   Patient presents with    Right Wrist - Follow-up         SUBJECTIVE:  Humphrey Ramon is a 33y o  year old male who presents to the office for follow up of right wrist pain  He is here today to review the results of his right wrist MRI   He has been wearing a removable wrist splint which has been beneficial  When not wearing his splint, he experiences popping and clicking with associated pain  He is accompanied by his wife today in the office  PAST MEDICAL HISTORY:  History reviewed  No pertinent past medical history  PAST SURGICAL HISTORY:  Past Surgical History:   Procedure Laterality Date    ANKLE SURGERY Bilateral     KNEE SURGERY Right        FAMILY HISTORY:  Family History   Problem Relation Age of Onset    No Known Problems Mother     No Known Problems Father        SOCIAL HISTORY:  Social History   Substance Use Topics    Smoking status: Never Smoker    Smokeless tobacco: Never Used    Alcohol use Yes      Comment: social       MEDICATIONS:    Current Outpatient Prescriptions:     Acetaminophen (TYLENOL PO), Take by mouth, Disp: , Rfl:     ibuprofen (MOTRIN) 200 mg tablet, Take by mouth every 6 (six) hours as needed for mild pain, Disp: , Rfl:     ALLERGIES:  No Known Allergies    REVIEW OF SYSTEMS:  Pertinent items are noted in HPI  A comprehensive review of systems was negative      LABS:  HgA1c:   Lab Results   Component Value Date    HGBA1C 5 2 08/20/2018     BMP: No results found for: GLUCOSE, CALCIUM, NA, K, CO2, CL, BUN, CREATININE    _____________________________________________________  PHYSICAL EXAMINATION:  General: well developed and well nourished, alert, oriented times 3 and appears comfortable  Psychiatric: Normal  HEENT: Trachea Midline, No torticollis  Cardiovascular: No discernable arrhythmia  Pulmonary: No wheezing or stridor  Skin: No masses, erthema, lacerations, fluctation, ulcerations  Neurovascular: Sensation Intact to the Median, Ulnar, Radial Nerve, Motor Intact to the Median, Ulnar, Radial Nerve and Pulses Intact    MUSCULOSKELETAL EXAMINATION:  Right wrist:  Tinel's positive at wrist  Snapping flexor tendons  Abductor brevis intact    _____________________________________________________  STUDIES REVIEWED:  Images were reviewd in PACS:  MRI of right wrist shows mass in distal carpal tunnel  US of right wrist shows 6 mm mass in carpal tunnel surrounded by flexor digitorum tendons      PROCEDURES PERFORMED:  Procedures  No Procedures performed today    Scribe Attestation    I,:   Kiel Santa am acting as a scribe while in the presence of the attending physician :        I,:   Micaela Marshall MD personally performed the services described in this documentation    as scribed in my presence :

## 2018-09-21 ENCOUNTER — TRANSCRIBE ORDERS (OUTPATIENT)
Dept: LAB | Facility: CLINIC | Age: 28
End: 2018-09-21

## 2018-09-21 ENCOUNTER — APPOINTMENT (OUTPATIENT)
Dept: LAB | Facility: CLINIC | Age: 28
End: 2018-09-21
Payer: COMMERCIAL

## 2018-09-21 DIAGNOSIS — R22.31 MASS OF RIGHT WRIST: ICD-10-CM

## 2018-09-21 DIAGNOSIS — R22.31 MASS OF FINGER OF RIGHT HAND: Primary | ICD-10-CM

## 2018-09-21 LAB
BASOPHILS # BLD AUTO: 0.04 THOUSANDS/ΜL (ref 0–0.1)
BASOPHILS NFR BLD AUTO: 1 % (ref 0–1)
EOSINOPHIL # BLD AUTO: 0.11 THOUSAND/ΜL (ref 0–0.61)
EOSINOPHIL NFR BLD AUTO: 2 % (ref 0–6)
ERYTHROCYTE [DISTWIDTH] IN BLOOD BY AUTOMATED COUNT: 13.2 % (ref 11.6–15.1)
ERYTHROCYTE [SEDIMENTATION RATE] IN BLOOD: 2 MM/HOUR (ref 0–10)
HCT VFR BLD AUTO: 41.9 % (ref 36.5–49.3)
HGB BLD-MCNC: 13.7 G/DL (ref 12–17)
IMM GRANULOCYTES # BLD AUTO: 0.01 THOUSAND/UL (ref 0–0.2)
IMM GRANULOCYTES NFR BLD AUTO: 0 % (ref 0–2)
LYMPHOCYTES # BLD AUTO: 2.31 THOUSANDS/ΜL (ref 0.6–4.47)
LYMPHOCYTES NFR BLD AUTO: 31 % (ref 14–44)
MCH RBC QN AUTO: 29.5 PG (ref 26.8–34.3)
MCHC RBC AUTO-ENTMCNC: 32.7 G/DL (ref 31.4–37.4)
MCV RBC AUTO: 90 FL (ref 82–98)
MONOCYTES # BLD AUTO: 0.67 THOUSAND/ΜL (ref 0.17–1.22)
MONOCYTES NFR BLD AUTO: 9 % (ref 4–12)
NEUTROPHILS # BLD AUTO: 4.32 THOUSANDS/ΜL (ref 1.85–7.62)
NEUTS SEG NFR BLD AUTO: 57 % (ref 43–75)
NRBC BLD AUTO-RTO: 0 /100 WBCS
PLATELET # BLD AUTO: 253 THOUSANDS/UL (ref 149–390)
PMV BLD AUTO: 10.1 FL (ref 8.9–12.7)
RBC # BLD AUTO: 4.65 MILLION/UL (ref 3.88–5.62)
WBC # BLD AUTO: 7.46 THOUSAND/UL (ref 4.31–10.16)

## 2018-09-21 PROCEDURE — 36415 COLL VENOUS BLD VENIPUNCTURE: CPT

## 2018-09-21 PROCEDURE — 86200 CCP ANTIBODY: CPT

## 2018-09-21 PROCEDURE — 86140 C-REACTIVE PROTEIN: CPT

## 2018-09-21 PROCEDURE — 86617 LYME DISEASE ANTIBODY: CPT

## 2018-09-21 PROCEDURE — 85025 COMPLETE CBC W/AUTO DIFF WBC: CPT

## 2018-09-21 PROCEDURE — 86235 NUCLEAR ANTIGEN ANTIBODY: CPT

## 2018-09-21 PROCEDURE — 86430 RHEUMATOID FACTOR TEST QUAL: CPT

## 2018-09-21 PROCEDURE — 80053 COMPREHEN METABOLIC PANEL: CPT

## 2018-09-21 PROCEDURE — 86038 ANTINUCLEAR ANTIBODIES: CPT

## 2018-09-21 PROCEDURE — 81374 HLA I TYPING 1 ANTIGEN LR: CPT

## 2018-09-21 PROCEDURE — 85652 RBC SED RATE AUTOMATED: CPT

## 2018-09-22 LAB
ALBUMIN SERPL BCP-MCNC: 4.2 G/DL (ref 3.5–5)
ALP SERPL-CCNC: 63 U/L (ref 46–116)
ALT SERPL W P-5'-P-CCNC: 23 U/L (ref 12–78)
ANION GAP SERPL CALCULATED.3IONS-SCNC: 3 MMOL/L (ref 4–13)
AST SERPL W P-5'-P-CCNC: 15 U/L (ref 5–45)
BILIRUB SERPL-MCNC: 1.67 MG/DL (ref 0.2–1)
BUN SERPL-MCNC: 16 MG/DL (ref 5–25)
CALCIUM SERPL-MCNC: 8.8 MG/DL (ref 8.3–10.1)
CHLORIDE SERPL-SCNC: 104 MMOL/L (ref 100–108)
CO2 SERPL-SCNC: 29 MMOL/L (ref 21–32)
CREAT SERPL-MCNC: 0.92 MG/DL (ref 0.6–1.3)
CRP SERPL QL: <3 MG/L
ENA SCL70 AB SER-ACNC: <0.2 AI (ref 0–0.9)
GFR SERPL CREATININE-BSD FRML MDRD: 114 ML/MIN/1.73SQ M
GLUCOSE P FAST SERPL-MCNC: 83 MG/DL (ref 65–99)
POTASSIUM SERPL-SCNC: 4.3 MMOL/L (ref 3.5–5.3)
PROT SERPL-MCNC: 7.1 G/DL (ref 6.4–8.2)
SODIUM SERPL-SCNC: 136 MMOL/L (ref 136–145)

## 2018-09-23 LAB
B BURGDOR IGG PATRN SER IB-IMP: NEGATIVE
B BURGDOR IGM PATRN SER IB-IMP: NEGATIVE
B BURGDOR18KD IGG SER QL IB: NORMAL
B BURGDOR23KD IGG SER QL IB: NORMAL
B BURGDOR23KD IGM SER QL IB: NORMAL
B BURGDOR28KD IGG SER QL IB: NORMAL
B BURGDOR30KD IGG SER QL IB: NORMAL
B BURGDOR39KD IGG SER QL IB: NORMAL
B BURGDOR39KD IGM SER QL IB: NORMAL
B BURGDOR41KD IGG SER QL IB: NORMAL
B BURGDOR41KD IGM SER QL IB: NORMAL
B BURGDOR45KD IGG SER QL IB: NORMAL
B BURGDOR58KD IGG SER QL IB: NORMAL
B BURGDOR66KD IGG SER QL IB: NORMAL
B BURGDOR93KD IGG SER QL IB: NORMAL
BACTERIA SPEC ANAEROBE CULT: NO GROWTH
BACTERIA TISS AEROBE CULT: NO GROWTH
GRAM STN SPEC: NORMAL
GRAM STN SPEC: NORMAL

## 2018-09-24 ENCOUNTER — TELEPHONE (OUTPATIENT)
Dept: OBGYN CLINIC | Facility: HOSPITAL | Age: 28
End: 2018-09-24

## 2018-09-24 LAB
CCP IGA+IGG SERPL IA-ACNC: 46 UNITS (ref 0–19)
RHEUMATOID FACT SER QL LA: NEGATIVE
RYE IGE QN: NEGATIVE

## 2018-09-24 NOTE — TELEPHONE ENCOUNTER
Caller: Patric BURCH/VAUGHN #: [phone]  Dr: Stephie Murphy    Pt called after hours looking for his test results  He said he received a call but missed it

## 2018-09-25 DIAGNOSIS — R76.8 CYCLIC CITRULLINATED PEPTIDE (CCP) ANTIBODY POSITIVE: Primary | ICD-10-CM

## 2018-09-25 DIAGNOSIS — M65.9 TENOSYNOVITIS OF RIGHT WRIST: ICD-10-CM

## 2018-09-25 NOTE — TELEPHONE ENCOUNTER
Spoke with pt and informed him that so far the cultures from his surgery are OK but that he did come back moderately positive for one of his rheumatologic markers  Therefore, sending him to rheumatology for further work up  Patient states understanding

## 2018-09-26 LAB — HLA-B27 QL NAA+PROBE: NEGATIVE

## 2018-09-28 ENCOUNTER — OFFICE VISIT (OUTPATIENT)
Dept: OBGYN CLINIC | Facility: HOSPITAL | Age: 28
End: 2018-09-28

## 2018-09-28 VITALS
HEART RATE: 92 BPM | SYSTOLIC BLOOD PRESSURE: 102 MMHG | DIASTOLIC BLOOD PRESSURE: 72 MMHG | WEIGHT: 192 LBS | BODY MASS INDEX: 26.88 KG/M2 | HEIGHT: 71 IN

## 2018-09-28 DIAGNOSIS — Z48.89 AFTERCARE FOLLOWING SURGERY: Primary | ICD-10-CM

## 2018-09-28 PROCEDURE — 99024 POSTOP FOLLOW-UP VISIT: CPT | Performed by: ORTHOPAEDIC SURGERY

## 2018-09-28 NOTE — PROGRESS NOTES
ASSESSMENT/PLAN:    Assessment:   27M pod8 open CTR with removal of fibroma (dos: 9/20/2018)    Plan:   -OT  -6pack exercises  -gel sleeve  -F/u Rheumatology  -f/u 6 wks    To Do Next Visit:  Re-eval      _____________________________________________________  CHIEF COMPLAINT:  Chief Complaint   Patient presents with    Right Wrist - Post-op         SUBJECTIVE:  Navid Waite is a 33y o  year old male who presents  A decent surgery on his right carpal tunnel  Carpal tunnel had an open release and extensive tenosynovitis was debrided which pathology found to be a fibroma  Blood work was also done and tissue cultures were taken although cultures have been negative  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  He will be referred to rheumatology for this  Overall event does have some pain add the radial aspect of his carpal tunnel which occasionally radiates proximally  He is otherwise doing well  No numbness or tingling or loss of function     PAST MEDICAL HISTORY:  No past medical history on file      PAST SURGICAL HISTORY:  Past Surgical History:   Procedure Laterality Date    ANKLE SURGERY Bilateral     KNEE ARTHROSCOPY Right     KNEE SURGERY Right     KS REVISE MEDIAN N/CARPAL TUNNEL SURG Right 9/20/2018    Procedure: RELEASE CARPAL TUNNEL;  Surgeon: Glendale Apley, MD;  Location:  MAIN OR;  Service: Orthopedics    SYNOVECTOMY Right 9/20/2018    Procedure: TENOSYNOVECTOMY Flexor digitorum superficialis to index, long, ring and small finger; Flexor digitorum profundus to index, long, ring and small finger; Flexor pollicis longus;  Surgeon: Glendale Apley, MD;  Location:  MAIN OR;  Service: Orthopedics       FAMILY HISTORY:  Family History   Problem Relation Age of Onset    No Known Problems Mother     No Known Problems Father        SOCIAL HISTORY:  Social History   Substance Use Topics    Smoking status: Never Smoker    Smokeless tobacco: Never Used    Alcohol use 1 8 oz/week     3 Cans of beer per week      Comment: social       MEDICATIONS:    Current Outpatient Prescriptions:     Acetaminophen (TYLENOL PO), Take by mouth, Disp: , Rfl:     DAILY MULTIPLE VITAMINS PO, Take by mouth, Disp: , Rfl:     HYDROcodone-acetaminophen (NORCO) 5-325 mg per tablet, Take 1 tablet by mouth every 6 (six) hours as needed for pain for up to 10 doses Max Daily Amount: 4 tablets, Disp: 10 tablet, Rfl: 0    ibuprofen (MOTRIN) 200 mg tablet, Take by mouth every 6 (six) hours as needed for mild pain, Disp: , Rfl:     ondansetron (ZOFRAN) 4 mg tablet, Take 1 tablet (4 mg total) by mouth every 8 (eight) hours as needed for nausea or vomiting for up to 10 doses, Disp: 20 tablet, Rfl: 0    ALLERGIES:  No Known Allergies    REVIEW OF SYSTEMS:  Pertinent items are noted in HPI  A comprehensive review of systems was negative  LABS:  HgA1c:   Lab Results   Component Value Date    HGBA1C 5 2 08/20/2018     BMP:   Lab Results   Component Value Date    CALCIUM 8 8 09/21/2018     09/21/2018    K 4 3 09/21/2018    CO2 29 09/21/2018     09/21/2018    BUN 16 09/21/2018    CREATININE 0 92 09/21/2018       _____________________________________________________  PHYSICAL EXAMINATION:  General: well developed and well nourished, alert, oriented times 3 and appears comfortable  Psychiatric: Normal  HEENT: Trachea Midline, No torticollis  Cardiovascular: No discernable arrhythmia  Pulmonary: No wheezing or stridor  Skin: No masses, erthema, lacerations, fluctation, ulcerations  Neurovascular: Sensation Intact to the Median, Ulnar, Radial Nerve, Motor Intact to the Median, Ulnar, Radial Nerve and Pulses Intact    MUSCULOSKELETAL EXAMINATION:  Extremities:    RUE:  Healing  Volar incision, no drainage, no dehiscence      AIN/ PIN/ulnar motor intact, SILT R/U/M      _____________________________________________________  STUDIES REVIEWED:  No Studies to review      PROCEDURES PERFORMED:  Procedures  No Procedures performed today     I interviewed, took the history and examined the patient  I discuss the case with the resident and reviewed the resident's note  I supervised the resident and I agree with the resident management plan as it was presented to me  I was present in the clinic and examined the patient

## 2018-10-02 ENCOUNTER — EVALUATION (OUTPATIENT)
Dept: OCCUPATIONAL THERAPY | Facility: CLINIC | Age: 28
End: 2018-10-02
Payer: COMMERCIAL

## 2018-10-02 DIAGNOSIS — M65.9 TENOSYNOVITIS OF RIGHT WRIST: Primary | ICD-10-CM

## 2018-10-02 PROCEDURE — G8984 CARRY CURRENT STATUS: HCPCS | Performed by: OCCUPATIONAL THERAPIST

## 2018-10-02 PROCEDURE — G8985 CARRY GOAL STATUS: HCPCS | Performed by: OCCUPATIONAL THERAPIST

## 2018-10-02 PROCEDURE — 97140 MANUAL THERAPY 1/> REGIONS: CPT | Performed by: OCCUPATIONAL THERAPIST

## 2018-10-02 PROCEDURE — 97165 OT EVAL LOW COMPLEX 30 MIN: CPT | Performed by: OCCUPATIONAL THERAPIST

## 2018-10-02 NOTE — PROGRESS NOTES
Daily Note     Today's date: 10/2/2018  Patient name: Evelyn Olmstead  : 1990  MRN: [id number]  Referring provider: Nicolas Elias MD  Dx:   Encounter Diagnosis     ICD-10-CM    1  Tenosynovitis of right wrist M65 9                   Subjective: See eval      Objective: See treatment diary below      Assessment: Tolerated treatment well  Patient would benefit from continued OT  PROM of digits full post  Also able to hold digits fully flexed with place and hold  Wrist flexion increased to 45 post        Plan: Continue per plan of care       Precautions: [place]    Specialty Daily Treatment Diary     Manual  10/2       AAROM digits 5'       Wrist stretching-gentle 5'       STM                            Exercise Diary  10/2       HEP: TG, blocking, wrist ROM Issued                                                                                                                                                                   Modalities 10/2       P 10'       Ultrasound

## 2018-10-02 NOTE — PROGRESS NOTES
OT Evaluation     Today's date: 10/2/2018  Patient name: Monserrat Bustillo  : 1990  MRN: [id number]  Referring provider: Mariangel Bianchi MD  Dx:   Encounter Diagnosis     ICD-10-CM    1  Tenosynovitis of right wrist M65 9                   Assessment  Impairments: abnormal or restricted ROM, activity intolerance, impaired physical strength, lacks appropriate home exercise program and pain with function  Patient presents with symptom irritability yes  Assessment details: Edith Cotton is s/p a R open CTR and debridement on 18  He presents with soft tissue tightness in the digits and wrist  His wrist ROM is impaired  He is unable to form an active fist at this time  He denies numbness or tingling in the fingertips  The incision is clean and healing well  See below for a detailed assessment  Understanding of Dx/Px/POC: excellent  Goals  STG: Patient will be compliant with post operative precautions (if applicable) and home exercise program in 2 weeks  STG: Pain will be reduced by 25% in 4 weeks  STG: Inflammation/edema/joint effusion will be reduced by 25% and patient will be independent with self management techniques in 4 weeks  STG: Range of motion of hand and wrist will be improved by 25% in 4 weeks  LTG: Range of motion of hand and wrist will be improved by 75% in 6-8 weeks  LTG: Strength will be improved by 50% in 6-8 weeks  LTG: Performance in ADLs and IADLS will be improved to prior level of function with the affected extremity within 6 weeks  LTG: Performance in work activity will be improved to prior level of function with the affected extremity within 6 weeks  LTG: FOTO score increase by 20 points within 6 weeks           Plan  Patient would benefit from: skilled OT and OT eval  Planned modality interventions: thermotherapy: hydrocollator packs and ultrasound  Planned therapy interventions: functional ROM exercises, home exercise program, joint mobilization, manual therapy, therapeutic exercise, patient education, massage and sensory integrative techniques  Frequency: 2x week  Duration in weeks: 6  Treatment plan discussed with: patient  Plan details: Treatment to include modalities, manual therapy, PRE's, HEP, and orthotics as appropriate  Subjective Evaluation    History of Present Illness  Date of surgery: 2018  Mechanism of injury: surgery  Mechanism of injury: Paloma Donovan is s/p a R open CTR and debridement on 18  He states symptoms for 4 weeks prior to surgery that included pain and loss of finger motion  Pain  Current pain ratin  At worst pain ratin  Location: Volar wrist   Quality: burning, throbbing and tight  Relieving factors: rest and support    Social Support  Lives with: spouse    Employment status: working ([employer]  )  Hand dominance: ambidextrous    Patient Goals  Patient goals for therapy: increased strength, independence with ADLs/IADLs, return to work, decreased pain and increased motion          Objective     Observations     Additional Observation Details  Steristrips in place over incision  Palpation   Left   Tenderness of the flexor digitorum superficialis  Tenderness     Additional Tenderness Details  +transverse carpal ligament    Neurological Testing     Sensation     Wrist/Hand   Left   Intact: light touch    Comments   Left light touch: 2 83     Active Range of Motion     Left Wrist   Wrist flexion: 80 degrees   Wrist extension: 60 degrees   Radial deviation: 15 degrees   Ulnar deviation: 30 degrees     Right Wrist   Wrist flexion: 30 degrees with pain  Wrist extension: 50 degrees with pain  Radial deviation: 10 degrees   Ulnar deviation: 15 degrees     Left Thumb   Flexion     MP: 60 degrees    DIP: 60 degrees  Palmar Abduction     CMC: 65 degrees  Radial abduction    CMC: 55 degrees  Opposition: To all tips of digits       Additional Active Range of Motion Details  Distance to [facility] [place]:  IF:4 cm  LF:4 cm  RF:3 cm  SF:4 cm    Strength/Myotome Testing     Additional Strength Details  Steristrips in place over incision  Tests     Left Wrist/Hand   Positive extrinsic flexor tightness       Swelling     Left Wrist/Hand   Circumference wrist: 17 5 cm    Right Wrist/Hand   Circumference wrist: 18 5 cm

## 2018-10-05 ENCOUNTER — OFFICE VISIT (OUTPATIENT)
Dept: OCCUPATIONAL THERAPY | Facility: CLINIC | Age: 28
End: 2018-10-05
Payer: COMMERCIAL

## 2018-10-05 DIAGNOSIS — M65.9 TENOSYNOVITIS OF RIGHT WRIST: Primary | ICD-10-CM

## 2018-10-05 PROCEDURE — 97110 THERAPEUTIC EXERCISES: CPT | Performed by: OCCUPATIONAL THERAPIST

## 2018-10-05 PROCEDURE — 97140 MANUAL THERAPY 1/> REGIONS: CPT | Performed by: OCCUPATIONAL THERAPIST

## 2018-10-05 NOTE — PROGRESS NOTES
Daily Note     Today's date: 10/5/2018  Patient name: Evelyn Olmstead  : 1990  MRN: [id number]  Referring provider: Nicolas Elias MD  Dx:   Encounter Diagnosis     ICD-10-CM    1  Tenosynovitis of right wrist M65 9                   Subjective: States 5-6/10 pain with finger flexion     Objective: See treatment diary below  Assessment: Demonstrating decreased tendon excursion for finger flexors, also pain radiating through wrist with active digit flexion  He is able to hold a gentle full fist with place and hold, and is 1cm from the palm actively with no assistance before limited by pain  Tight extrinsic flexors, likely also affected by scar tissue adherence  Plan: Continue per plan of care       Precautions: [place]    Specialty Daily Treatment Diary     Manual  10/2 10/5      AAROM digits 5' 5' Place and hold for tendon excursion       Wrist stretching-gentle 5' 5'      STM  10' with scar sucker                          Exercise Diary  10/2 10/5      HEP: TG, blocking, wrist ROM Issued       Progressive grasp pencils  2x      Keypegs  1x      Wrist maze        Rubber bands on tennis ball   1x      Tennis ball wall walking   10x      GPW  Gentle grasp and thumb flexion x30                                                                                                                   Modalities 10/2 10/5      MHP 10' 10'      Ultrasound

## 2018-10-09 ENCOUNTER — APPOINTMENT (OUTPATIENT)
Dept: OCCUPATIONAL THERAPY | Facility: CLINIC | Age: 28
End: 2018-10-09
Payer: COMMERCIAL

## 2018-10-12 ENCOUNTER — OFFICE VISIT (OUTPATIENT)
Dept: OCCUPATIONAL THERAPY | Facility: CLINIC | Age: 28
End: 2018-10-12
Payer: COMMERCIAL

## 2018-10-12 ENCOUNTER — OFFICE VISIT (OUTPATIENT)
Dept: RHEUMATOLOGY | Facility: CLINIC | Age: 28
End: 2018-10-12
Payer: COMMERCIAL

## 2018-10-12 VITALS
WEIGHT: 195.2 LBS | HEART RATE: 76 BPM | SYSTOLIC BLOOD PRESSURE: 126 MMHG | BODY MASS INDEX: 27.33 KG/M2 | DIASTOLIC BLOOD PRESSURE: 78 MMHG | HEIGHT: 71 IN

## 2018-10-12 DIAGNOSIS — M65.9 TENOSYNOVITIS OF RIGHT WRIST: Primary | ICD-10-CM

## 2018-10-12 DIAGNOSIS — D21.9 FIBROMA: ICD-10-CM

## 2018-10-12 DIAGNOSIS — R76.8 POSITIVE ANTI-CCP TEST: Primary | ICD-10-CM

## 2018-10-12 PROCEDURE — 97035 APP MDLTY 1+ULTRASOUND EA 15: CPT | Performed by: OCCUPATIONAL THERAPIST

## 2018-10-12 PROCEDURE — 97110 THERAPEUTIC EXERCISES: CPT | Performed by: OCCUPATIONAL THERAPIST

## 2018-10-12 PROCEDURE — 97140 MANUAL THERAPY 1/> REGIONS: CPT | Performed by: OCCUPATIONAL THERAPIST

## 2018-10-12 PROCEDURE — 99204 OFFICE O/P NEW MOD 45 MIN: CPT | Performed by: INTERNAL MEDICINE

## 2018-10-12 NOTE — PROGRESS NOTES
Daily Note     Today's date: 10/12/2018  Patient name: Ethan Kent  : 1990  MRN: [id number]  Referring provider: Michael Apodaca MD  Dx:   Encounter Diagnosis     ICD-10-CM    1  Tenosynovitis of right wrist M65 9                   Subjective: States 2-3/10  pain with finger flexion     Objective: See treatment diary below  Assessment: Doing much better  Forming a full fist, however tightness is limited  He is reporting much less pain however, and is also demonstrating better wrist motion  Initiated ultrasound today for further scar tissue management  Can continue with upgrades as tolerated  Plan: Continue per plan of care       Precautions: [place]    Specialty Daily Treatment Diary     Manual  10/2 10/5 10/12     AAROM digits 5' 5' Place and hold for tendon excursion  5' Place and hold for tendon excursion      Wrist stretching-gentle 5' 5' 5'     STM  10' with scar sucker 5'                         Exercise Diary  10/2 10/5 10/12     HEP: TG, blocking, wrist ROM Issued       Progressive grasp pencils  2x      Keypegs  1x 1x     Wrist maze        Rubber bands on tennis ball   1x      Tennis ball wall walking   10x Green ball 10x     GPW  Gentle grasp and thumb flexion x30  Digits and thumb 3x10      Yellow ext web    3x10     Green ball on wall circles   3x10     pegs   In with green, out with 4th                                                                                          Modalities 10/2 10/5 10/12     MHP 10' 10' 10'     Ultrasound   8'

## 2018-10-12 NOTE — PROGRESS NOTES
Assessment and Plan:   Mr Rainer Banda is a 50-year-old  male with history significant for recent diagnosis of fibroma of right wrist, right knee MCL repair and bilateral ankle surgeries secondary to injuries sustained, who presents for further evaluation of a positive anti CCP antibody  # Positive anti CCP antibody  [place] presents today for further evaluation of a positive anti CCP antibody along with joint pains most noticeable in his right wrist, and on occasion in his bilateral ankles and right knee  I suspect the pre-existing arthralgias are likely related to prior injuries that he has sustained as well as surgeries he has undergone, as there does not appear to be any synovitis on his exam today  The right wrist pain can likely be explained by the presence of the fibroma and as a postsurgical event, but MRI as well as ultrasound does not show any evidence of synovitis  - There are no other features to suggest an underlying inflammatory arthritis (unremarkable physical exam and imaging studies), and I suspect the positive CCP antibody is not playing a role in active disease at this time  Studies have shown that the presence of a positive anti CCP antibody can predict the development of rheumatoid arthritis in the future as far as up to 8 years  I did discuss this with him and in view of this I would like to follow up with him at least every 6-12 months to monitor for development of any new symptoms  For now we can continue to monitor without any intervention  Plan:  Diagnoses and all orders for this visit:    Positive anti-CCP test    Fibroma      Activities as tolerated    Fall precautions emphasized    Diet: low carb/low fat, more greens/vegetables, adequate hydration  Exercise: try to maintain a low impact exercise regimen as much as possible  Walk for 30 minutes a day for at least 3 days a week    Encouraged to maintain good sleep hygiene    Continue other medications as prescribed by PCP and other specialists        RTC in 6 months  HPI  Mr Glenna Taylor is a 69-year-old  male with history significant for recent diagnosis of fibroma of right wrist, right knee MCL repair and bilateral ankle surgeries secondary to injuries sustained, who presents for further evaluation of a positive anti CCP antibody  Patient reports in July 2018 he developed sudden onset of pain and clicking in his right wrist following an injury sustained while lifting heavy pillars  He noticed a sudden onset of pain in the volar aspect of his wrist at that time which radiated as a sharp shooting sensation up his forearm  He was seen by Dr Joana Barriga on the 9th of August at which time he had an x-ray and MRI of his right wrist without contrast ordered  The x-ray was unremarkable and the MRI of the right wrist showed mild degenerative change of the carpal bones as well as a nodular lesion identified at the level of the profundus tendons within the carpal tunnel measuring up to 7 mm  It was recommended that he undergo an MRI with and without contrast   This showed a 6 mm diameter mass in the distal carpal tunnel thought to be a solid lesion without clear etiology  It also demonstrated mild tenosynovitis of the flexor digitorum superficialis of the long finger likely due to mass effect from the adjacent mass  An ultrasound of the right wrist also showed similar findings  There was no evidence synovitis noted on the imaging studies  In view of these findings he underwent excision of the mass from the right wrist on the 20th of September with results suggestive of fibroma of the tendon sheath with associated synovium  Gram stain and cultures were negative  He also underwent further arthritis testing which showed a positive anti CCP antibody at 46  POWER, rheumatoid factor, HLA B27, inflammatory markers, CBC, CMP, Lyme disease testing and anti scleroderma antibody were all negative    He was referred to Rheumatology for further evaluation of the positive anti CCP antibody  Patient states since his surgery his wrist mobility has been slowly improving and he continues to undergo occupational therapy  He also tells me that Dr Suzette Pierre was not convinced with the findings of fibroma and has sent the mass sample for further histopathological evaluation  Other than the pain in his right wrist, he also reports very intermittent pain in his bilateral ankles and right knee, but attributes this to the prior surgeries on his ankles as well as the MCL repair  As mentioned the pain in his right wrist is improving but on occasion he does take ibuprofen over-the-counter which helps his symptoms  He reports occasional swelling in his bilateral ankles towards the end of the day and also intermittently experiences swelling in his right knee since 2011 following the surgery  He denies any other joint pains or swelling  He on occasion experiences morning stiffness in his knee and ankles that lasts about 30-45 minutes  He denies fevers, chills, night sweats, unintentional weight loss, sicca symptoms, skin rash, photosensitivity, mouth or nose ulcers, swollen glands, pleuritic chest pain, shortness of breath, abdominal pain, vomiting, diarrhea, blood in stools, blood clots, Raynaud's or family history of rheumatoid arthritis or other autoimmune diseases  His mother and grandfather do have osteoarthritis  No other complaints at this time  The following portions of the patient's history were reviewed and updated as appropriate: allergies, current medications, past family history, past medical history, past social history, past surgical history and problem list       Review of Systems  Constitutional: Negative for weight change, fevers, chills, night sweats, fatigue  ENT/Mouth: Negative for hearing changes, ear pain, nasal congestion, sinus pain, hoarseness, sore throat, rhinorrhea, swallowing difficulty     Eyes: Negative for pain, redness, discharge, vision changes  Cardiovascular: Negative for chest pain, SOB, palpitations  Respiratory: Negative for cough, sputum, wheezing, dyspnea  Gastrointestinal: Negative for nausea, vomiting, diarrhea, constipation, pain, heartburn  Genitourinary: Negative for dysuria, urinary frequency, hematuria  Musculoskeletal: As per HPI  Skin: Negative for skin rash, color changes  Neuro: Negative for weakness, numbness, tingling, loss of consciousness  Psych: Negative for anxiety, depression  Heme/Lymph: Negative for easy bruising, bleeding, lymphadenopathy  History reviewed  No pertinent past medical history  Past Surgical History:   Procedure Laterality Date    ANKLE SURGERY Bilateral     KNEE ARTHROSCOPY Right     KNEE SURGERY Right     MD REVISE MEDIAN N/CARPAL TUNNEL SURG Right 9/20/2018    Procedure: RELEASE CARPAL TUNNEL;  Surgeon: Lonnie Rushing MD;  Location:  MAIN OR;  Service: Orthopedics    SYNOVECTOMY Right 9/20/2018    Procedure: TENOSYNOVECTOMY Flexor digitorum superficialis to index, long, ring and small finger; Flexor digitorum profundus to index, long, ring and small finger; Flexor pollicis longus;  Surgeon: Lonnie Rushing MD;  Location: [facility];  Service: Orthopedics       Social History     Social History    Marital status: /Civil Union     Spouse name: N/A    Number of children: N/A    Years of education: N/A     Occupational History    Not on file       Social History Main Topics    Smoking status: Never Smoker    Smokeless tobacco: Never Used    Alcohol use 1 8 oz/week     3 Cans of beer per week      Comment: social    Drug use: No    Sexual activity: Not on file     Other Topics Concern    Not on file     Social History Narrative    No narrative on file       Family History   Problem Relation Age of Onset    No Known Problems Mother     No Known Problems Father        No Known Allergies      Current Outpatient Prescriptions:   DAILY MULTIPLE VITAMINS PO, Take by mouth, Disp: , Rfl:     ibuprofen (MOTRIN) 200 mg tablet, Take by mouth every 6 (six) hours as needed for mild pain, Disp: , Rfl:       Objective:    Vitals:    10/12/18 0808   BP: 126/78   BP Location: Left arm   Patient Position: Sitting   Cuff Size: Adult   Pulse: 76   Weight: 88 5 kg (195 lb 3 2 oz)   Height: 5' 11" (1 803 m)       Physical Exam  General: Well appearing, well nourished, in no distress  Oriented x 3, normal mood and affect  Ambulating without difficulty  Skin: Good turgor, no rash, unusual bruising or prominent lesions  Hair: Normal texture and distribution  Nails: Normal color, no deformities  HEENT:  Head: Normocephalic, atraumatic  Eyes: Conjunctiva clear, sclera non-icteric, EOM intact, PERRL  Nose: No external lesions, mucosa non-inflamed  Mouth: Mucous membranes moist, no mucosal lesions  Neck: Supple, thyroid non-enlarged and non-tender  Heart: Regular rate and rhythm, no murmur or gallop  Lungs: Clear to auscultation, no crackles or wheezing  Abdomen: Soft, non-tender, non-distended, bowel sounds normal    Extremities: No amputations or deformities, cyanosis, edema  Musculoskeletal:   Hands - mild tenderness on right hand 5th DIP but without soft tissue swelling  The remainder of DIPs, PIPs and MCPs are unremarkable  Wrists - right wrist with well-healing scar on the volar aspect  He does have tenderness over the region but is able to perform near full range of motion with flexion and extension  Left wrist is unremarkable  Bilateral elbows, shoulders, hips, knees, ankles and feet are unremarkable  Negative MTP squeeze tests bilaterally  Neurologic: Alert and oriented  No focal neurological deficits appreciated  Psychiatric: Normal mood and affect  PATRICK Lopez    Rheumatology

## 2018-10-16 ENCOUNTER — OFFICE VISIT (OUTPATIENT)
Dept: OCCUPATIONAL THERAPY | Facility: CLINIC | Age: 28
End: 2018-10-16
Payer: COMMERCIAL

## 2018-10-16 DIAGNOSIS — M65.9 TENOSYNOVITIS OF RIGHT WRIST: Primary | ICD-10-CM

## 2018-10-16 PROCEDURE — 97035 APP MDLTY 1+ULTRASOUND EA 15: CPT | Performed by: OCCUPATIONAL THERAPIST

## 2018-10-16 PROCEDURE — 97010 HOT OR COLD PACKS THERAPY: CPT

## 2018-10-16 PROCEDURE — 97140 MANUAL THERAPY 1/> REGIONS: CPT | Performed by: OCCUPATIONAL THERAPIST

## 2018-10-16 PROCEDURE — 97110 THERAPEUTIC EXERCISES: CPT | Performed by: OCCUPATIONAL THERAPIST

## 2018-10-19 ENCOUNTER — OFFICE VISIT (OUTPATIENT)
Dept: OCCUPATIONAL THERAPY | Facility: CLINIC | Age: 28
End: 2018-10-19
Payer: COMMERCIAL

## 2018-10-19 DIAGNOSIS — M65.9 TENOSYNOVITIS OF RIGHT WRIST: Primary | ICD-10-CM

## 2018-10-19 PROCEDURE — 97140 MANUAL THERAPY 1/> REGIONS: CPT | Performed by: OCCUPATIONAL THERAPIST

## 2018-10-19 PROCEDURE — 97110 THERAPEUTIC EXERCISES: CPT | Performed by: OCCUPATIONAL THERAPIST

## 2018-10-19 NOTE — PROGRESS NOTES
Daily Note     Today's date: 10/19/2018  Patient name: Samuel Morales  : 1990  MRN: [id number]  Referring provider: Darling Dupree MD  Dx:   Encounter Diagnosis     ICD-10-CM    1  Tenosynovitis of right wrist M65 9                   Subjective: "I tripped on the stairs and fell right on my wrist"    Objective: See treatment diary below  Assessment: Scar tissue density is improving, as is tenderness to the region  ROM of wrist is moderately affected by long flexor tightness  Doing well with PRE upgrades  States he is considering returning to work on Monday  Plan: Continue per plan of care       Precautions: [place]    Specialty Daily Treatment Diary     Manual  10/2 10/5 10/12 10/16 10/19   AAROM digits 5' 5' Place and hold for tendon excursion  5' Place and hold for tendon excursion  5' Place and hold     Wrist stretching-gentle 5' 5' 5' 5' 5'   STM  10' with scar sucker 5' 5' 5'   Cupping      5'               Exercise Diary  10/2 10/5 10/12 10/16 10/19   HEP: TG, blocking, wrist ROM Issued       Progressive grasp pencils  2x      Keypegs  1x 1x 1x    Wrist maze        Rubber bands on tennis ball   1x      Tennis ball wall walking   10x Green ball 10x Green Ball 10x Red ball x10   GPW  Gentle grasp and thumb flexion x30  Digits and thumb 3x10  Digits and thumb 2x10  BPW Digits and thumb 1x10 BPW 3x15 digits and thumb   Yellow ext web    3x10 3x10 Orange 3x10   Green ball on wall circles   3x10 3x10 Red ball 3x10   pegs   In with green, out with 4th  In with green, out with 4th  In with blue, out with 4th    GFB flex/extend     3x10                                                                               Modalities 10/2 10/5 10/12 10/16 10/19   MHP 10' 10' 10' 10' 10'   Ultrasound   8' 8'

## 2018-10-22 LAB — FUNGUS SPEC CULT: NORMAL

## 2018-10-23 ENCOUNTER — OFFICE VISIT (OUTPATIENT)
Dept: OCCUPATIONAL THERAPY | Facility: CLINIC | Age: 28
End: 2018-10-23
Payer: COMMERCIAL

## 2018-10-23 DIAGNOSIS — M65.9 TENOSYNOVITIS OF RIGHT WRIST: Primary | ICD-10-CM

## 2018-10-23 PROCEDURE — 97140 MANUAL THERAPY 1/> REGIONS: CPT | Performed by: OCCUPATIONAL THERAPIST

## 2018-10-23 PROCEDURE — 97110 THERAPEUTIC EXERCISES: CPT | Performed by: OCCUPATIONAL THERAPIST

## 2018-10-23 PROCEDURE — 97010 HOT OR COLD PACKS THERAPY: CPT

## 2018-10-23 NOTE — PROGRESS NOTES
Daily Note     Today's date: 10/23/2018  Patient name: Johnny Maldonado  : 1990  MRN: [id number]  Referring provider: Graeme Ceballos MD  Dx:   Encounter Diagnosis     ICD-10-CM    1  Tenosynovitis of right wrist M65 9                   Subjective: "It's getting better, I have dropped a few things though"    Objective: See treatment diary below  Assessment: Stated increased soreness from last visit, but overall feeling better, he did not return to work this week  Decreased reps for PRE's secondary to increased soreness from last treatment session  Tolerated this treatment session well  Plan: Continue per plan of care       Precautions: [place]    Specialty Daily Treatment Diary     Manual  10/23 10/5 10/12 10/16 10/19   AAROM digits 5' 5' Place and hold for tendon excursion  5' Place and hold for tendon excursion  5' Place and hold     Wrist stretching-gentle 5' 5' 5' 5' 5'   STM 5' 10' with scar sucker 5' 5' 5'   Cupping      5'               Exercise Diary  10/23 10/5 10/12 10/16 10/19   HEP: TG, blocking, wrist ROM Issued       Progressive grasp pencils  2x      Keypegs  1x 1x 1x    Wrist maze 10x       Rubber bands on tennis ball   1x      Tennis ball wall walking  Red Ball 10x 10x Green ball 10x Green Ball 10x Red ball x10   GPW BPW 3x10 digits and thumb Gentle grasp and thumb flexion x30  Digits and thumb 3x10  Digits and thumb 2x10  BPW Digits and thumb 1x10 BPW 3x15 digits and thumb   Yellow ext web  Yellow 2x10  Orange 1x10  3x10 3x10 Orange 3x10   Green ball on wall circles Red ball 3x10  3x10 3x10 Red ball 3x10   pegs In with green out with 4th  In with green, out with 4th  In with green, out with 4th  In with blue, out with 4th    GFB flex/extend 3x10    3x10                                                                               Modalities 10/2 10/5 10/12 10/16 10/19   MHP 10' 10' 10' 10' 10'   Ultrasound   8' 8'

## 2018-10-26 ENCOUNTER — APPOINTMENT (OUTPATIENT)
Dept: OCCUPATIONAL THERAPY | Facility: CLINIC | Age: 28
End: 2018-10-26
Payer: COMMERCIAL

## 2018-11-06 LAB
MYCOBACTERIUM SPEC CULT: NORMAL
RHODAMINE-AURAMINE STN SPEC: NORMAL

## 2018-11-30 ENCOUNTER — OFFICE VISIT (OUTPATIENT)
Dept: OBGYN CLINIC | Facility: HOSPITAL | Age: 28
End: 2018-11-30

## 2018-11-30 VITALS
DIASTOLIC BLOOD PRESSURE: 70 MMHG | WEIGHT: 195.6 LBS | HEIGHT: 71 IN | HEART RATE: 65 BPM | BODY MASS INDEX: 27.38 KG/M2 | SYSTOLIC BLOOD PRESSURE: 131 MMHG

## 2018-11-30 DIAGNOSIS — Z48.89 AFTERCARE FOLLOWING SURGERY: Primary | ICD-10-CM

## 2018-11-30 PROCEDURE — 99024 POSTOP FOLLOW-UP VISIT: CPT | Performed by: ORTHOPAEDIC SURGERY

## 2018-11-30 NOTE — PROGRESS NOTES
Assessment:   S/P Right open CTR and tenosynovectomy of FDS, FDP and FPL 09/20/18    Plan:   Resume activities as tolerated and scar tissue massage  Continue rheumatology follow up for elevated CCP    Follow Up:  PRN      CHIEF COMPLAINT:  Chief Complaint   Patient presents with    Right Wrist - Post-op         SUBJECTIVE:  Luis Miguel Caicedo is a 33y o  year old male who presents for follow up S/P Right open CTR and tenosynovectomy of FDS, FDP and FPL 09/20/18  Today patient has no complaints in regards to snapping or clicking in the wrist  States overall it feels much better  He is following up with rheumatology  PHYSICAL EXAMINATION:  General: well developed and well nourished, alert, oriented times 3 and appears comfortable  Psychiatric: Normal    MUSCULOSKELETAL EXAMINATION:  Incision: Clean, dry, intact  Range of Motion: As expected and FROM wrist and fingers without crepitation   No popping/clicking felt on exam today   Strength: 5/5 wrist flexion/extension and   Neurovascular status: Neuro intact, good cap refill  Activity Restrictions: No restrictions       STUDIES REVIEWED:  No Studies to review      PROCEDURES PERFORMED:  Procedures  No Procedures performed today   Scribe Attestation    I,:   Uzair Valerio PA-C am acting as a scribe while in the presence of the attending physician :        I,:   Peter Najera MD personally performed the services described in this documentation    as scribed in my presence :

## 2018-11-30 NOTE — LETTER
November 30, 2018     Patient: Vannessa Donaldson   YOB: 1990   Date of Visit: 11/30/2018       To Whom it May Concern:    Vannessa Donaldson is under my professional care  He was seen in my office on 11/30/2018  He may return to work full duty, no restrictions  If you have any questions or concerns, please don't hesitate to call           Sincerely,          Carmen Hamilton MD        CC: No Recipients

## 2019-06-08 ENCOUNTER — HOSPITAL ENCOUNTER (EMERGENCY)
Facility: HOSPITAL | Age: 29
Discharge: HOME | End: 2019-06-08
Attending: EMERGENCY MEDICINE | Admitting: EMERGENCY MEDICINE
Payer: COMMERCIAL

## 2019-06-08 ENCOUNTER — APPOINTMENT (EMERGENCY)
Dept: RADIOLOGY | Facility: HOSPITAL | Age: 29
End: 2019-06-08
Payer: COMMERCIAL

## 2019-06-08 VITALS
HEART RATE: 76 BPM | OXYGEN SATURATION: 98 % | TEMPERATURE: 98.8 F | SYSTOLIC BLOOD PRESSURE: 125 MMHG | RESPIRATION RATE: 18 BRPM | DIASTOLIC BLOOD PRESSURE: 66 MMHG

## 2019-06-08 DIAGNOSIS — S61.219A FINGER LACERATION: Primary | ICD-10-CM

## 2019-06-08 PROCEDURE — 99284 EMERGENCY DEPT VISIT MOD MDM: CPT | Performed by: EMERGENCY MEDICINE

## 2019-06-08 PROCEDURE — 12001 RPR S/N/AX/GEN/TRNK 2.5CM/<: CPT | Performed by: EMERGENCY MEDICINE

## 2019-06-08 PROCEDURE — 90715 TDAP VACCINE 7 YRS/> IM: CPT | Performed by: EMERGENCY MEDICINE

## 2019-06-08 PROCEDURE — 90471 IMMUNIZATION ADMIN: CPT

## 2019-06-08 PROCEDURE — 73130 X-RAY EXAM OF HAND: CPT

## 2019-06-08 PROCEDURE — 99283 EMERGENCY DEPT VISIT LOW MDM: CPT

## 2019-06-08 RX ADMIN — TETANUS TOXOID, REDUCED DIPHTHERIA TOXOID AND ACELLULAR PERTUSSIS VACCINE, ADSORBED 0.5 ML: 5; 2.5; 8; 8; 2.5 SUSPENSION INTRAMUSCULAR at 21:15

## 2019-06-08 RX ADMIN — Medication 1 APPLICATION: at 21:20

## 2019-09-18 ENCOUNTER — OFFICE VISIT (OUTPATIENT)
Dept: URGENT CARE | Facility: CLINIC | Age: 29
End: 2019-09-18
Payer: COMMERCIAL

## 2019-09-18 ENCOUNTER — APPOINTMENT (OUTPATIENT)
Dept: RADIOLOGY | Facility: CLINIC | Age: 29
End: 2019-09-18
Payer: COMMERCIAL

## 2019-09-18 VITALS
OXYGEN SATURATION: 98 % | WEIGHT: 190 LBS | SYSTOLIC BLOOD PRESSURE: 118 MMHG | HEART RATE: 88 BPM | TEMPERATURE: 99.7 F | BODY MASS INDEX: 26.6 KG/M2 | DIASTOLIC BLOOD PRESSURE: 78 MMHG | HEIGHT: 71 IN | RESPIRATION RATE: 18 BRPM

## 2019-09-18 DIAGNOSIS — S61.219A LACERATION WITHOUT FOREIGN BODY OF UNSPECIFIED FINGER WITHOUT DAMAGE TO NAIL, INITIAL ENCOUNTER: ICD-10-CM

## 2019-09-18 DIAGNOSIS — S62.644B OPEN NONDISPLACED FRACTURE OF PROXIMAL PHALANX OF RIGHT RING FINGER, INITIAL ENCOUNTER: ICD-10-CM

## 2019-09-18 DIAGNOSIS — S69.91XA INJURY OF RIGHT RING FINGER, INITIAL ENCOUNTER: Primary | ICD-10-CM

## 2019-09-18 DIAGNOSIS — S69.91XA INJURY OF RIGHT RING FINGER, INITIAL ENCOUNTER: ICD-10-CM

## 2019-09-18 PROCEDURE — 13120 CMPLX RPR S/A/L 1.1-2.5 CM: CPT | Performed by: PHYSICIAN ASSISTANT

## 2019-09-18 PROCEDURE — S9088 SERVICES PROVIDED IN URGENT: HCPCS | Performed by: PHYSICIAN ASSISTANT

## 2019-09-18 PROCEDURE — 73130 X-RAY EXAM OF HAND: CPT

## 2019-09-18 PROCEDURE — 99203 OFFICE O/P NEW LOW 30 MIN: CPT | Performed by: PHYSICIAN ASSISTANT

## 2019-09-18 RX ORDER — SULFAMETHOXAZOLE AND TRIMETHOPRIM 800; 160 MG/1; MG/1
1 TABLET ORAL 2 TIMES DAILY
Qty: 14 TABLET | Refills: 0 | Status: SHIPPED | OUTPATIENT
Start: 2019-09-18 | End: 2019-09-25

## 2019-09-18 RX ORDER — BACITRACIN, NEOMYCIN, POLYMYXIN B 400; 3.5; 5 [USP'U]/G; MG/G; [USP'U]/G
1 OINTMENT TOPICAL ONCE
Status: COMPLETED | OUTPATIENT
Start: 2019-09-18 | End: 2019-09-18

## 2019-09-18 RX ORDER — LIDOCAINE HYDROCHLORIDE 20 MG/ML
10 INJECTION, SOLUTION EPIDURAL; INFILTRATION; INTRACAUDAL; PERINEURAL ONCE
Status: COMPLETED | OUTPATIENT
Start: 2019-09-18 | End: 2019-09-18

## 2019-09-18 RX ADMIN — BACITRACIN, NEOMYCIN, POLYMYXIN B 1 SMALL APPLICATION: 400; 3.5; 5 OINTMENT TOPICAL at 12:29

## 2019-09-18 RX ADMIN — LIDOCAINE HYDROCHLORIDE 10 ML: 20 INJECTION, SOLUTION EPIDURAL; INFILTRATION; INTRACAUDAL; PERINEURAL at 12:29

## 2019-09-18 NOTE — LETTER
September 18, 2019     Patient: Yash Farnsworth   YOB: 1990   Date of Visit: 9/18/2019       To Whom it May Concern:    Yash Farnsworth was seen in my clinic on 9/18/2019  He may return to work on 09/21/2019  If you have any questions or concerns, please don't hesitate to call           Sincerely,          Karmen Gómez PA-C        CC: No Recipients

## 2019-09-18 NOTE — PROGRESS NOTES
NAME: Dani Khan is a 33 y o  male  : 1990    MRN: [id number]      Assessment and Plan   Injury of right ring finger, initial encounter [[id number]]  1  Injury of right ring finger, initial encounter  XR hand 3+ vw right    sulfamethoxazole-trimethoprim (BACTRIM DS) 800-160 mg per tablet   2  Laceration without foreign body of unspecified finger without damage to nail, initial encounter  lidocaine (PF) (XYLOCAINE-MPF) 2 % injection 10 mL    neomycin-bacitracin-polymyxin b (NEOSPORIN) ointment 1 small application    Laceration repair    sulfamethoxazole-trimethoprim (BACTRIM DS) 800-160 mg per tablet   3  Open nondisplaced fracture of proximal phalanx of right ring finger, initial encounter  Splint   Wound repair  Right hand x-ray ordered to rule out fracture  X-ray showed  nondisplaced fracture 4th PIP  Finger splint provided  Advised patient to keep the area covered dry for the next 24 hours  Advised patient to keep wound clean and covered  Suture removal in 7-10 days  Bactrim provided to prevent infection  Patient referred to Ortho appointment made with Dr Alonzo Hernandez on 10/11 at 12:30pm, patient states his wife Vonna Kocher  Work for Dr Alonzo Hernandez and will be able to schedule him an appointment sooner  Recommended use of Neosporin ointment  Recommended use of antibacterial soap  Patient understands and agrees with treatment plans  Final x-ray results  Patient is aware and already referred to Ortho  Nondisplaced comminuted intra-articular fracture of the 4th middle phalanx      Administrations This Visit     lidocaine (PF) (XYLOCAINE-MPF) 2 % injection 10 mL     Admin Date  2019 Action  Given Dose  10 mL Route  Infiltration Administered By  Tirso Waite RN          neomycin-bacitracin-polymyxin b (NEOSPORIN) ointment 1 small application     Admin Date  2019 Action  Given Dose  1 small application Route  Topical Administered By  MINA Saez seen today for finger laceration  Diagnoses and all orders for this visit:    Injury of right ring finger, initial encounter  -     XR hand 3+ vw right; Future  -     sulfamethoxazole-trimethoprim (BACTRIM DS) 800-160 mg per tablet; Take 1 tablet by mouth 2 (two) times a day for 7 days smx-tmp DS (BACTRIM) 800-160 mg tabs (1tab q12 D10)    Laceration without foreign body of unspecified finger without damage to nail, initial encounter  -     lidocaine (PF) (XYLOCAINE-MPF) 2 % injection 10 mL  -     neomycin-bacitracin-polymyxin b (NEOSPORIN) ointment 1 small application  -     Laceration repair  -     sulfamethoxazole-trimethoprim (BACTRIM DS) 800-160 mg per tablet; Take 1 tablet by mouth 2 (two) times a day for 7 days smx-tmp DS (BACTRIM) 800-160 mg tabs (1tab q12 D10)    Open nondisplaced fracture of proximal phalanx of right ring finger, initial encounter  -     Splint        Patient Instructions   There are no Patient Instructions on file for this visit  Proceed to ER if symptoms worsen  Chief Complaint     Chief Complaint   Patient presents with    Finger Laceration     right ring finger lac occurred at 1030 today  [place] side of medial finger with horizontal wound ~ 2cm in length; open but oozing only minimally  Pt cleaned with water; ice and direct pressure applied upon [facility] to [facility] at same site with pain and swelling  Injury occured via heavy (~ 200#) metal plate falling onto 4th digit right hand  History of Present Illness     32 Yo old Pt presents  for laceration right x hours ring finger x 1 day  Patient reports today after clogging out at work he was assisting a co-worker  lift metal plates which weighed 200 lb states that slipped and fell on his hands  Patient rates pain 9/10, states he is unable to bend finger  Patient admits to swelling and ecchymosis  Admits to numbness, tingling and weakness  Denies use of blood thinners     LAST TETANUS SHOT = UTD      Review of Systems   Review of Systems   Constitutional: Negative  Respiratory: Negative  Cardiovascular: Negative  Musculoskeletal: Positive for joint swelling ( right 4th ring finger)  Skin: Positive for wound (Right 4th ring finger)  Current Medications       Current Outpatient Medications:     DAILY MULTIPLE VITAMINS PO, Take by mouth, Disp: , Rfl:     ibuprofen (MOTRIN) 200 mg tablet, Take by mouth every 6 (six) hours as needed for mild pain, Disp: , Rfl:     sulfamethoxazole-trimethoprim (BACTRIM DS) 800-160 mg per tablet, Take 1 tablet by mouth 2 (two) times a day for 7 days smx-tmp DS (BACTRIM) 800-160 mg tabs (1tab q12 D10), Disp: 14 tablet, Rfl: 0  No current facility-administered medications for this visit  Current Allergies     Allergies as of 09/18/2019    (No Known Allergies)              No past medical history on file  Past Surgical History:   Procedure Laterality Date    ANKLE SURGERY Bilateral     KNEE ARTHROSCOPY Right     KNEE SURGERY Right     TN REVISE MEDIAN N/CARPAL TUNNEL SURG Right 9/20/2018    Procedure: RELEASE CARPAL TUNNEL;  Surgeon: Rubin Crouch MD;  Location: [facility] OR;  Service: Orthopedics    SYNOVECTOMY Right 9/20/2018    Procedure: TENOSYNOVECTOMY Flexor digitorum superficialis to index, long, ring and small finger; Flexor digitorum profundus to index, long, ring and small finger; Flexor pollicis longus;  Surgeon: Rubin Crouch MD;  Location:  MAIN OR;  Service: Orthopedics       Family History   Problem Relation Age of Onset    No Known Problems Mother     No Known Problems Father          Medications have been verified      The following portions of the patient's history were reviewed and updated as appropriate: allergies, current medications, past family history, past medical history, past social history, past surgical history and problem list     Objective   /78   Pulse 88   Temp 99 7 °F (37 6 °C)   Resp 18  5' 11" (1 803 m)   Wt 86 2 kg (190 lb)   SpO2 98%   BMI 26 50 kg/m²    Laceration repair  Date/Time: 9/18/2019 12:23 PM  Performed by: Sarah Acosta PA-C  Authorized by: Sarah Acosta PA-C   Consent: Verbal consent obtained  Consent given by: patient  Patient understanding: patient states understanding of the procedure being performed  Site marked: the operative site was marked  Radiology Images displayed and confirmed  If images not available, report reviewed: imaging studies available  Patient identity confirmed: verbally with patient  Time out: Immediately prior to procedure a "time out" was called to verify the correct patient, procedure, equipment, support staff and site/side marked as required  Body area: upper extremity  Location details: right ring finger  Laceration length: 2 cm  Foreign bodies: no foreign bodies  Tendon involvement: none  Nerve involvement: none  Vascular damage: no  Anesthesia: digital block    Anesthesia:  Local Anesthetic: lidocaine 2% without epinephrine  Anesthetic total: 5 mL    Sedation:  Patient sedated: no      Wound Dehiscence:  Superficial Wound Dehiscence: simple closure      Procedure Details:  Preparation: Patient was prepped and draped in the usual sterile fashion  Irrigation solution: saline  Irrigation method: tap  Amount of cleaning: extensive  Debridement: none  Degree of undermining: none  Skin closure: 5-0 nylon  Number of sutures: 5  Technique: simple  Approximation: close  Approximation difficulty: complex  Dressing: antibiotic ointment, non-adhesive packing strip and splint  Patient tolerance: Patient tolerated the procedure well with no immediate complications        Physical Exam     Physical Exam   Constitutional: He is oriented to person, place, and time  He appears well-developed  No distress  Cardiovascular: Normal rate, regular rhythm, normal heart sounds and intact distal pulses  Exam reveals no gallop and no friction rub     No murmur heard   Pulmonary/Chest: Effort normal and breath sounds normal  No stridor  No respiratory distress  He has no wheezes  He has no rales  He exhibits no tenderness  Musculoskeletal:        Right hand: He exhibits decreased range of motion, tenderness, bony tenderness and laceration  He exhibits normal capillary refill and no swelling  Normal sensation noted  Decreased strength noted  He exhibits finger abduction  Hands:  Neurological: He is alert and oriented to person, place, and time  Skin: Skin is warm  He is not diaphoretic  Nursing note and vitals reviewed        Karmen Gómez PA-C

## 2019-09-18 NOTE — LETTER
September 18, 2019     Patient: Austin Ortiz   YOB: 1990   Date of Visit: 9/18/2019       To Whom it May Concern:    Autsin Ortiz was seen in my clinic on 9/18/2019  He may return to work on 09/20/2019  If you have any questions or concerns, please don't hesitate to call           Sincerely,          Karmen Gómez PA-C        CC: No Recipients

## 2019-09-20 ENCOUNTER — OFFICE VISIT (OUTPATIENT)
Dept: OBGYN CLINIC | Facility: HOSPITAL | Age: 29
End: 2019-09-20
Payer: COMMERCIAL

## 2019-09-20 ENCOUNTER — OFFICE VISIT (OUTPATIENT)
Dept: OCCUPATIONAL THERAPY | Facility: HOSPITAL | Age: 29
End: 2019-09-20
Attending: ORTHOPAEDIC SURGERY
Payer: COMMERCIAL

## 2019-09-20 VITALS
SYSTOLIC BLOOD PRESSURE: 118 MMHG | BODY MASS INDEX: 27.86 KG/M2 | DIASTOLIC BLOOD PRESSURE: 70 MMHG | HEIGHT: 71 IN | HEART RATE: 62 BPM | WEIGHT: 199 LBS

## 2019-09-20 DIAGNOSIS — S67.10XA CRUSH INJURY TO FINGER, INITIAL ENCOUNTER: Primary | ICD-10-CM

## 2019-09-20 DIAGNOSIS — S62.624A CLOSED DISPLACED FRACTURE OF MIDDLE PHALANX OF RIGHT RING FINGER, INITIAL ENCOUNTER: ICD-10-CM

## 2019-09-20 PROCEDURE — L3913 HFO W/O JOINTS CF: HCPCS | Performed by: OCCUPATIONAL THERAPIST

## 2019-09-20 PROCEDURE — 99213 OFFICE O/P EST LOW 20 MIN: CPT | Performed by: ORTHOPAEDIC SURGERY

## 2019-09-20 RX ORDER — ACETAMINOPHEN 500 MG
500 TABLET ORAL EVERY 6 HOURS PRN
COMMUNITY

## 2019-09-20 NOTE — LETTER
September 20, 2019     Patient: Enrrique Deluna   YOB: 1990   Date of Visit: 9/20/2019       To Whom it May Concern:    Enrrique Deluna is under my professional care  He was seen in my office on 9/20/2019  He may return to work minimal use of the right hand NO lifting  If you have any questions or concerns, please don't hesitate to call           Sincerely,          Micaela Marshall MD        CC: No Recipients

## 2019-09-20 NOTE — PROGRESS NOTES
ASSESSMENT/PLAN:    Assessment:   Right ring finger minimally displaced comminuted middle phalanx fracture   Crush injury    Plan:   Patient will see hand therapy for a hand based ulnar gutter splint with immobilization of 3,4,5 finger in intrinsic + position  Light duty at work    Follow Up:  1  week(s)    To Do Next Visit:  Sutures out    General Discussions:  Fracture - Nonoperative Care: The physiology of a fractured bone was discussed with the patient today  With nondisplaced or minimally displaced fractures, conservative treatment often results in a functional recovery  Typically, these fractures are immobilized in either a cast or splint depending on the pattern  Radiographs are typically taken at intervals throughout the fracture healing to ensure that muscular forces do not cause loss of reduction or alignment  If the fracture loses its alignment, surgical intervention may be required to stabilize it  Medical conditions such as diabetes, osteoporosis, vitamin D deficiency, and a history of or exposure to smoking may delay or prevent fracture healing     _____________________________________________________  CHIEF COMPLAINT:  Chief Complaint   Patient presents with    Right Ring Finger - Fracture         SUBJECTIVE:  Jacque Blanco is a 33y o  year old male who presents with right ring finger fracture  Patient states on 9/18/19 while helping a  Coworker 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 metal plate it slipped and fell on his hand  He was seen at [facility] care  Laceration was suture, he was placed in a splint and referred here today for follow up  Patient is S/P Right open CTR and tenosynovectomy of FDS, FDP and FPL 09/20/18    PAST MEDICAL HISTORY:  History reviewed  No pertinent past medical history      PAST SURGICAL HISTORY:  Past Surgical History:   Procedure Laterality Date    ANKLE SURGERY Bilateral     KNEE ARTHROSCOPY Right     KNEE SURGERY Right     [place] REVISE MEDIAN N/CARPAL TUNNEL SURG Right 9/20/2018 Procedure: RELEASE CARPAL TUNNEL;  Surgeon: Lucy Michelle MD;  Location:  MAIN OR;  Service: Orthopedics    SYNOVECTOMY Right 9/20/2018    Procedure: TENOSYNOVECTOMY Flexor digitorum superficialis to index, long, ring and small finger; Flexor digitorum profundus to index, long, ring and small finger; Flexor pollicis longus;  Surgeon: Lucy Michelle MD;  Location:  MAIN OR;  Service: Orthopedics       FAMILY HISTORY:  Family History   Problem Relation Age of Onset    No Known Problems Mother     No Known Problems Father        SOCIAL HISTORY:  Social History     Tobacco Use    Smoking status: Never Smoker    Smokeless tobacco: Never Used   Substance Use Topics    Alcohol use: Yes     Alcohol/week: 3 0 standard drinks     Types: 3 Cans of beer per week     Comment: social    Drug use: No       MEDICATIONS:    Current Outpatient Medications:     acetaminophen (TYLENOL) 500 mg tablet, Take 500 mg by mouth every 6 (six) hours as needed for mild pain, Disp: , Rfl:     DAILY MULTIPLE VITAMINS PO, Take by mouth, Disp: , Rfl:     ibuprofen (MOTRIN) 200 mg tablet, Take by mouth every 6 (six) hours as needed for mild pain, Disp: , Rfl:     sulfamethoxazole-trimethoprim (BACTRIM DS) 800-160 mg per tablet, Take 1 tablet by mouth 2 (two) times a day for 7 days smx-tmp DS (BACTRIM) 800-160 mg tabs (1tab q12 D10), Disp: 14 tablet, Rfl: 0    ALLERGIES:  No Known Allergies    REVIEW OF SYSTEMS:  Pertinent items are noted in HPI  A comprehensive review of systems was negative      LABS:  HgA1c:   Lab Results   Component Value Date    HGBA1C 5 2 08/20/2018     BMP:   Lab Results   Component Value Date    CALCIUM 8 8 09/21/2018    K 4 3 09/21/2018    CO2 29 09/21/2018     09/21/2018    BUN 16 09/21/2018    CREATININE 0 92 09/21/2018         _____________________________________________________  PHYSICAL EXAMINATION:  /70   Pulse 62   Ht 5' 11" (1 803 m)   Wt 90 3 kg (199 lb)   BMI 27 75 kg/m² General: well developed and well nourished, alert, oriented times 3 and appears comfortable  Psychiatric: Normal  HEENT: Trachea Midline, No torticollis  Cardiovascular: No discernable arrhythmia  Pulmonary: No wheezing or stridor  Skin: No Masses, No Erythema, No Fluctuation  Neurovascular: Sensation Intact to the Median, Ulnar, Radial Nerve, Motor Intact to the Median, Ulnar, Radial Nerve and Pulses Intact    MUSCULOSKELETAL EXAMINATION:  Right ring finger  Decreased sensation over the ulnar dig nerve distrubution   FDS, FDP intact  Brisk capillar refill  _____________________________________________________  STUDIES REVIEWED:   Images were reviewd in PACS: Right hand x-rays demonstrates a ring finger comminuted middle phalanx fracture        PROCEDURES PERFORMED:  Procedures  No Procedures performed today    Scribe Attestation    I,:   Ashley Cowart am acting as a scribe while in the presence of the attending physician :        I,:   Monse Wood MD personally performed the services described in this documentation    as scribed in my presence :

## 2019-09-20 NOTE — PROGRESS NOTES
Orthosis    Diagnosis:   1  Crush injury to right ring finger, initial encounter  Ambulatory referral to PT/OT hand therapy   2  Closed displaced fracture of middle phalanx of right ring finger, initial encounter  Ambulatory referral to PT/OT hand therapy     Indication: Motion Blocking    Location: Right  long finger, ring finger and small finger  Supplies: Custom Fit Orthotic and Skin coverage   Orthosis type: Ulnar Gutter Hand Based  Wearing Schedule: Remove for hygiene only  Describe Position: intrinsic plus    Precautions: Universal (skin contact/breakdown)    Patient or Caregiver expresses understanding of wearing Schedule and Precautions? Yes  Patient or Caregiver able to don/doff orthotic independently? Yes    Written orders provided to patient?  Yes  Orders Obtained: Written  Orders Obtained from: Dr Rafael Villasenor    Return for evaluation and treatment No

## 2019-09-27 ENCOUNTER — OFFICE VISIT (OUTPATIENT)
Dept: OBGYN CLINIC | Facility: HOSPITAL | Age: 29
End: 2019-09-27
Payer: COMMERCIAL

## 2019-09-27 ENCOUNTER — HOSPITAL ENCOUNTER (OUTPATIENT)
Dept: RADIOLOGY | Facility: HOSPITAL | Age: 29
Discharge: HOME | End: 2019-09-27
Attending: ORTHOPAEDIC SURGERY
Payer: COMMERCIAL

## 2019-09-27 VITALS
HEART RATE: 78 BPM | SYSTOLIC BLOOD PRESSURE: 115 MMHG | DIASTOLIC BLOOD PRESSURE: 76 MMHG | BODY MASS INDEX: 27.58 KG/M2 | WEIGHT: 197 LBS | HEIGHT: 71 IN

## 2019-09-27 DIAGNOSIS — S62.624A CLOSED DISPLACED FRACTURE OF MIDDLE PHALANX OF RIGHT RING FINGER, INITIAL ENCOUNTER: Primary | ICD-10-CM

## 2019-09-27 DIAGNOSIS — S62.624A CLOSED DISPLACED FRACTURE OF MIDDLE PHALANX OF RIGHT RING FINGER, INITIAL ENCOUNTER: ICD-10-CM

## 2019-09-27 PROCEDURE — 99213 OFFICE O/P EST LOW 20 MIN: CPT | Performed by: ORTHOPAEDIC SURGERY

## 2019-09-27 PROCEDURE — 73140 X-RAY EXAM OF FINGER(S): CPT

## 2019-09-27 NOTE — PROGRESS NOTES
ASSESSMENT/PLAN:    Assessment:   R ring finger minimally displaced comminuted middle phalanx fx s/p crush injury on 9/18/19    Plan:   Xrays discussed  At this time, c/w splint (for total of 3-4 weeks)  Continue light duty at work    Explained that it does not seem likely that the nerve has been lacerated, but it has been crushed and neuroma formation is possible  A&P and tx options for this discussed if this occurs  Follow Up:  2  week(s)    To Do Next Visit:  X-rays of the  right  ring finger    _____________________________________________________  CHIEF COMPLAINT:  Chief Complaint   Patient presents with    Right Ring Finger - Follow-up         SUBJECTIVE:  Goran Mcfarlane is a 33 y o  male who presents for follow up regarding R ring finger minimally displaced comminuted middle phalanx fx s/p crush injury on 9/18/19  Patient states he has been wearing his splint as instructed  States pain is tolerable, has occasional shooting pain from the finger up the forearm  Describes decreased sensation primarily radial side of the finger  PAST MEDICAL HISTORY:  No past medical history on file      PAST SURGICAL HISTORY:  Past Surgical History:   Procedure Laterality Date    ANKLE SURGERY Bilateral     KNEE ARTHROSCOPY Right     KNEE SURGERY Right     [place] REVISE MEDIAN N/CARPAL TUNNEL SURG Right 9/20/2018    Procedure: RELEASE CARPAL TUNNEL;  Surgeon: Víctor Cha MD;  Location:  MAIN OR;  Service: Orthopedics    SYNOVECTOMY Right 9/20/2018    Procedure: TENOSYNOVECTOMY Flexor digitorum superficialis to index, long, ring and small finger; Flexor digitorum profundus to index, long, ring and small finger; Flexor pollicis longus;  Surgeon: Víctor Cha MD;  Location:  MAIN OR;  Service: Orthopedics       FAMILY HISTORY:  Family History   Problem Relation Age of Onset    No Known Problems Mother     No Known Problems Father        SOCIAL HISTORY:  Social History     Tobacco Use    Smoking status: Never Smoker    Smokeless tobacco: Never Used   Substance Use Topics    Alcohol use: Yes     Alcohol/week: 3 0 standard drinks     Types: 3 Cans of beer per week     Comment: social    Drug use: No       MEDICATIONS:    Current Outpatient Medications:     acetaminophen (TYLENOL) 500 mg tablet, Take 500 mg by mouth every 6 (six) hours as needed for mild pain, Disp: , Rfl:     DAILY MULTIPLE VITAMINS PO, Take by mouth, Disp: , Rfl:     ibuprofen (MOTRIN) 200 mg tablet, Take by mouth every 6 (six) hours as needed for mild pain, Disp: , Rfl:     ALLERGIES:  No Known Allergies    REVIEW OF SYSTEMS:  Pertinent items are noted in HPI  A comprehensive review of systems was negative  LABS:  HgA1c:   Lab Results   Component Value Date    HGBA1C 5 2 08/20/2018     BMP:   Lab Results   Component Value Date    CALCIUM 8 8 09/21/2018    K 4 3 09/21/2018    CO2 29 09/21/2018     09/21/2018    BUN 16 09/21/2018    CREATININE 0 92 09/21/2018           _____________________________________________________  PHYSICAL EXAMINATION:  Vital signs: /76   Pulse 78   Ht 5' 11" (1 803 m)   Wt 89 4 kg (197 lb)   BMI 27 48 kg/m²   General: well developed and well nourished, alert, oriented times 3 and appears comfortable  Psychiatric: Normal  HEENT: Trachea Midline, No torticollis  Cardiovascular: No discernable arrhythmia  Pulmonary: No wheezing or stridor  Skin: No masses, erythema, fluctation, ulcerations  Patient has healing laceration on volar aspect of finger  Neurovascular: Sensation Intact to the Median, Ulnar, Radial Nerve, Motor Intact to the Median, Ulnar, Radial Nerve and Pulses Intact    MUSCULOSKELETAL EXAMINATION:  RIGHT SIDE:  Finger: Moderate edema of the ring finger  Volar laceration healing  Slight ecchymosis, primarily near the nail  States he can feel both sides of the finger, but it is decreased, especially on the radial aspect of the finger       2 point:  Ring radial - 15mm  Ring ulnar - 15mm    _____________________________________________________  STUDIES REVIEWED:  Images were reviewd in PACS: Xrays appear similar to last week with a comminuted ring finger middle phalanx fracture, no concerning displacement at this time        PROCEDURES PERFORMED:  Procedures  No Procedures performed today   Scribe Attestation    I,:   Roge Zafar PA-C am acting as a scribe while in the presence of the attending physician :        I,:   Marianna Gabriel MD personally performed the services described in this documentation    as scribed in my presence :

## 2019-10-16 ENCOUNTER — OFFICE VISIT (OUTPATIENT)
Dept: OBGYN CLINIC | Facility: HOSPITAL | Age: 29
End: 2019-10-16
Payer: COMMERCIAL

## 2019-10-16 ENCOUNTER — HOSPITAL ENCOUNTER (OUTPATIENT)
Dept: RADIOLOGY | Facility: HOSPITAL | Age: 29
Discharge: HOME | End: 2019-10-16
Attending: ORTHOPAEDIC SURGERY
Payer: COMMERCIAL

## 2019-10-16 VITALS
DIASTOLIC BLOOD PRESSURE: 75 MMHG | HEIGHT: 71 IN | BODY MASS INDEX: 27.48 KG/M2 | HEART RATE: 73 BPM | SYSTOLIC BLOOD PRESSURE: 123 MMHG

## 2019-10-16 DIAGNOSIS — T14.8XXA FRACTURE: ICD-10-CM

## 2019-10-16 DIAGNOSIS — S62.624A CLOSED DISPLACED FRACTURE OF MIDDLE PHALANX OF RIGHT RING FINGER, INITIAL ENCOUNTER: ICD-10-CM

## 2019-10-16 DIAGNOSIS — T14.8XXA FRACTURE: Primary | ICD-10-CM

## 2019-10-16 PROCEDURE — 73140 X-RAY EXAM OF FINGER(S): CPT

## 2019-10-16 PROCEDURE — 99213 OFFICE O/P EST LOW 20 MIN: CPT | Performed by: ORTHOPAEDIC SURGERY

## 2019-10-16 NOTE — PROGRESS NOTES
ASSESSMENT/PLAN:    Assessment:   R ring finger minimally displaced comminuted middle phalanx fx s/p crush injury 09/18/19    Plan: At this time, can d/c splint for normal light activities  Use brace for heavy activities only for the next 2 weeks  At that time, can wean out of brace completely    Follow Up:  PRN    _____________________________________________________  CHIEF COMPLAINT:  Chief Complaint   Patient presents with    Right Ring Finger - Follow-up         SUBJECTIVE:  Arden Gottron is a 30 y o  male who presents for follow up regarding R ring finger minimally displaced comminuted middle phalanx fx s/p crush injury 09/18/19  Patient states overall he is doing well  States the n/t has greatly improved  Some mild discomfort and stiffness of the finger  PAST MEDICAL HISTORY:  No past medical history on file  PAST SURGICAL HISTORY:  Past Surgical History:   Procedure Laterality Date    ANKLE SURGERY Bilateral     KNEE ARTHROSCOPY Right     KNEE SURGERY Right     [place] REVISE MEDIAN N/CARPAL TUNNEL SURG Right 9/20/2018    Procedure: RELEASE CARPAL TUNNEL;  Surgeon: Luis Jacobs MD;  Location:  MAIN OR;  Service: Orthopedics    SYNOVECTOMY Right 9/20/2018    Procedure: TENOSYNOVECTOMY Flexor digitorum superficialis to index, long, ring and small finger; Flexor digitorum profundus to index, long, ring and small finger; Flexor pollicis longus;  Surgeon: Luis Jacobs MD;  Location:  MAIN OR;  Service: Orthopedics       FAMILY HISTORY:  Family History   Problem Relation Age of Onset    No Known Problems Mother     No Known Problems Father        SOCIAL HISTORY:  Social History     Tobacco Use    Smoking status: Never Smoker    Smokeless tobacco: Never Used   Substance Use Topics    Alcohol use:  Yes     Alcohol/week: 3 0 standard drinks     Types: 3 Cans of beer per week     Comment: social    Drug use: No       MEDICATIONS:    Current Outpatient Medications:     acetaminophen (TYLENOL) 500 mg tablet, Take 500 mg by mouth every 6 (six) hours as needed for mild pain, Disp: , Rfl:     DAILY MULTIPLE VITAMINS PO, Take by mouth, Disp: , Rfl:     ibuprofen (MOTRIN) 200 mg tablet, Take by mouth every 6 (six) hours as needed for mild pain, Disp: , Rfl:     ALLERGIES:  No Known Allergies    REVIEW OF SYSTEMS:  Pertinent items are noted in HPI  A comprehensive review of systems was negative  LABS:  HgA1c:   Lab Results   Component Value Date    HGBA1C 5 2 08/20/2018     BMP:   Lab Results   Component Value Date    CALCIUM 8 8 09/21/2018    K 4 3 09/21/2018    CO2 29 09/21/2018     09/21/2018    BUN 16 09/21/2018    CREATININE 0 92 09/21/2018           _____________________________________________________  PHYSICAL EXAMINATION:  Vital signs: Ht 5' 11" (1 803 m)   BMI 27 48 kg/m²   General: well developed and well nourished, alert, oriented times 3 and appears comfortable  Psychiatric: Normal  HEENT: Trachea Midline, No torticollis  Cardiovascular: No discernable arrhythmia  Pulmonary: No wheezing or stridor  Skin: No masses, erythema, lacerations, fluctation, ulcerations  Neurovascular: Sensation Intact to the Median, Ulnar, Radial Nerve, Motor Intact to the Median, Ulnar, Radial Nerve and Pulses Intact    MUSCULOSKELETAL EXAMINATION:  RIGHT SIDE:  Finger:  Minimal edema  Patient with very minimal discomfort along fx site  Flexion intact at MP, PIP and DIP joints but LROM of DIP joint  SILT both radial and ulnar aspects of the fingertip and states this feels similar to his other fingers      _____________________________________________________  STUDIES REVIEWED:  Images were reviewd in PACS: Xrays today show continued well-aligned middle phalanx comminuted fracture with some signs of early healing      PROCEDURES PERFORMED:  Procedures  No Procedures performed today   Scribe Attestation    I,:   Yg Maguire PA-C am acting as a scribe while in the presence of the attending physician :        I,:   Tonja Canela MD personally performed the services described in this documentation    as scribed in my presence :

## 2020-01-06 ENCOUNTER — OFFICE VISIT (OUTPATIENT)
Dept: FAMILY MEDICINE CLINIC | Facility: CLINIC | Age: 30
End: 2020-01-06
Payer: COMMERCIAL

## 2020-01-06 ENCOUNTER — OFFICE VISIT (OUTPATIENT)
Dept: OBGYN CLINIC | Facility: HOSPITAL | Age: 30
End: 2020-01-06
Payer: COMMERCIAL

## 2020-01-06 ENCOUNTER — HOSPITAL ENCOUNTER (OUTPATIENT)
Dept: RADIOLOGY | Facility: HOSPITAL | Age: 30
Discharge: HOME | End: 2020-01-06
Payer: COMMERCIAL

## 2020-01-06 VITALS
TEMPERATURE: 99.2 F | RESPIRATION RATE: 16 BRPM | HEART RATE: 92 BPM | DIASTOLIC BLOOD PRESSURE: 64 MMHG | SYSTOLIC BLOOD PRESSURE: 118 MMHG | OXYGEN SATURATION: 98 %

## 2020-01-06 VITALS — SYSTOLIC BLOOD PRESSURE: 108 MMHG | DIASTOLIC BLOOD PRESSURE: 71 MMHG | HEART RATE: 94 BPM

## 2020-01-06 DIAGNOSIS — Z00.00 ANNUAL PHYSICAL EXAM: Primary | ICD-10-CM

## 2020-01-06 DIAGNOSIS — M25.572 PAIN, JOINT, ANKLE AND FOOT, LEFT: ICD-10-CM

## 2020-01-06 DIAGNOSIS — J18.9 PNEUMONITIS: ICD-10-CM

## 2020-01-06 DIAGNOSIS — Z13.6 SCREENING FOR CARDIOVASCULAR CONDITION: ICD-10-CM

## 2020-01-06 DIAGNOSIS — Z13.1 SCREENING FOR DIABETES MELLITUS: ICD-10-CM

## 2020-01-06 DIAGNOSIS — M25.572 PAIN, JOINT, ANKLE AND FOOT, LEFT: Primary | ICD-10-CM

## 2020-01-06 DIAGNOSIS — S93.602D FOOT SPRAIN, LEFT, SUBSEQUENT ENCOUNTER: ICD-10-CM

## 2020-01-06 DIAGNOSIS — S93.409A SPRAIN OF ANKLE, INITIAL ENCOUNTER: ICD-10-CM

## 2020-01-06 PROCEDURE — 73610 X-RAY EXAM OF ANKLE: CPT

## 2020-01-06 PROCEDURE — 99213 OFFICE O/P EST LOW 20 MIN: CPT | Performed by: PHYSICIAN ASSISTANT

## 2020-01-06 PROCEDURE — 99385 PREV VISIT NEW AGE 18-39: CPT | Performed by: FAMILY MEDICINE

## 2020-01-06 NOTE — PROGRESS NOTES
[address] [facility]    NAME: Gadiel Rice  AGE: 30 y o  SEX: male  : 1990     DATE: 2020     Assessment and Plan:     Problem List Items Addressed This Visit     None      Visit Diagnoses     Annual physical exam    -  Primary    Doyal Meli appears healthy on exam   He is to continue a balanced diet, and when he physically can, return to regular exercise  FBW ordered  Screening for diabetes mellitus        Relevant Orders    Comprehensive metabolic panel    HEMOGLOBIN A1C W/ EAG ESTIMATION    Screening for cardiovascular condition        Relevant Orders    Lipid Panel with Direct LDL reflex    Pneumonitis        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, 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  Appears to be improving/resolving  Has PRN Albuterol  Call if does not completely resolve  Relevant Medications    albuterol (PROVENTIL HFA,VENTOLIN HFA) 90 mcg/act inhaler    Foot sprain, left, subsequent encounter        Stable; in boot  Followed by Ortho  Immunizations and preventive care screenings were discussed with patient today  Appropriate education was printed on patient's after visit summary  Counseling:  Dental Health: discussed importance of regular tooth brushing, flossing, and dental visits  · Exercise: the importance of regular exercise/physical activity was discussed  Recommend exercise 3-5 times per week for at least 30 minutes  BMI Counseling: There is no height or weight on file to calculate BMI  The BMI is above normal  Nutrition recommendations include consuming healthier snacks  Exercise recommendations include exercising 3-5 times per week  No pharmacotherapy was ordered             Return in 1 year (on 2021) for Annual physical      Chief Complaint:     Chief Complaint   Patient presents with   [facility] there to establish care as new patient     Annual Exam     patient here for annual wellness exam       History of Present Illness:     Adult Annual Physical   Patient here for a comprehensive physical exam  The patient reports no problems  Hailee Barbosa presents with his wife  Has a hx of left foot sprain - in boot currently; seeing Ortho (Dr Marcin Head; MRI tomorrow)  Also pt is a  - had episode where he became weak / faint / dizzy / tight chested / with SOB and was seen at the [facility] ER - treated with DuoNeb, and then given PRN inhaler (last used yesterday)  No hx of asthma; pt is a non-smoker  Will have a first child in 04/2020  Had Tdap in 06/2019, and Flu Vaccine in 10/2019  Diet and Physical Activity  · Diet/Nutrition: well balanced diet  Exercise: no formal exercise  Has a physical job  Depression Screening  PHQ-9 Depression Screening    PHQ-9:    Frequency of the following problems over the past two weeks:       Little interest or pleasure in doing things:  0 - not at all  Feeling down, depressed, or hopeless:  0 - not at all  PHQ-2 Score:  0       General Health  · Sleep: sleeps well  · Hearing: normal - bilateral   · Vision: no vision problems  · Dental: no dental visits for >1 year  He will be seeing a Dentist       [facility]  · History of STDs?: no      Review of Systems:     Review of Systems   Constitutional: Negative for activity change and fever  Respiratory: Negative for shortness of breath and wheezing  Had been wheezing  Is getting better by his report  Cardiovascular: Negative for chest pain  Gastrointestinal: Negative for abdominal pain and blood in stool  Genitourinary: Negative for dysuria  Musculoskeletal: Positive for arthralgias  Psychiatric/Behavioral: Negative for dysphoric mood  The patient is not nervous/anxious  Past Medical History:     History reviewed  No pertinent past medical history     Past Surgical History:     Past Surgical History:   Procedure Laterality Date    ANKLE SURGERY Bilateral     KNEE ARTHROSCOPY Right     KNEE SURGERY Right     [place] REVISE MEDIAN N/CARPAL TUNNEL SURG Right 9/20/2018    Procedure: RELEASE CARPAL TUNNEL;  Surgeon: Tommy Ladd MD;  Location:  MAIN OR;  Service: Orthopedics    SYNOVECTOMY Right 9/20/2018    Procedure: TENOSYNOVECTOMY Flexor digitorum superficialis to index, long, ring and small finger; Flexor digitorum profundus to index, long, ring and small finger; Flexor pollicis longus;  Surgeon: Tommy Ladd MD;  Location:  MAIN OR;  Service: Orthopedics    TONSILLECTOMY AND ADENOIDECTOMY        Social History:     Social History     Socioeconomic History    Marital status: /Civil Union     Spouse name: None    Number of children: None    Years of education: None    Highest education level: None   Occupational History    None   Social Needs    Financial resource strain: None    Food insecurity:     Worry: None     Inability: None    Transportation needs:     Medical: None     Non-medical: None   Tobacco Use    Smoking status: Never Smoker    Smokeless tobacco: Never Used   Substance and Sexual Activity    Alcohol use:  Yes     Alcohol/week: 3 0 standard drinks     Types: 3 Cans of beer per week     Comment: social    Drug use: No    Sexual activity: None   Lifestyle    Physical activity:     Days per week: None     Minutes per session: None    Stress: None   Relationships    Social connections:     Talks on phone: None     Gets together: None     Attends Bahai service: None     Active member of club or organization: None     Attends meetings of clubs or organizations: None     Relationship status: None    Intimate partner violence:     Fear of current or ex partner: None     Emotionally abused: None     Physically abused: None     Forced sexual activity: None   Other Topics Concern    None   Social History Narrative    None      Family History:     Family History   Problem Relation Age of Onset    No Known Problems Mother     No Known Problems Father Current Medications:     Current Outpatient Medications   Medication Sig Dispense Refill    DAILY MULTIPLE VITAMINS PO Take by mouth      acetaminophen (TYLENOL) 500 mg tablet Take 500 mg by mouth every 6 (six) hours as needed for mild pain      albuterol (PROVENTIL HFA,VENTOLIN HFA) 90 mcg/act inhaler Inhale 2 puffs every 6 (six) hours as needed for wheezing      ibuprofen (MOTRIN) 200 mg tablet Take by mouth every 6 (six) hours as needed for mild pain       No current facility-administered medications for this visit  Allergies:     No Known Allergies   Physical Exam:     /64   Pulse 92   Temp 99 2 °F (37 3 °C)   Resp 16   SpO2 98%     Physical Exam   Constitutional: He is oriented to person, place, and time  He appears well-developed and well-nourished  No distress  Left lower leg is in boot currently  HENT:   Head: Normocephalic and atraumatic  Right Ear: Hearing, tympanic membrane, external ear and ear canal normal    Left Ear: Hearing, tympanic membrane, external ear and ear canal normal    Mouth/Throat: Oropharynx is clear and moist  No oropharyngeal exudate  Eyes: Conjunctivae are normal    Neck: Normal range of motion  Neck supple  No thyromegaly present  Cardiovascular: Normal rate, regular rhythm and normal heart sounds  Exam reveals no gallop and no friction rub  No murmur heard  Pulmonary/Chest: Effort normal and breath sounds normal  No stridor  No respiratory distress  He has no wheezes  He has no rales  Abdominal: Soft  Bowel sounds are normal  He exhibits no distension and no mass  There is no tenderness  There is no rebound and no guarding  Lymphadenopathy:     He has no cervical adenopathy  Neurological: He is alert and oriented to person, place, and time  Skin: He is not diaphoretic  Psychiatric: He has a normal mood and affect  His behavior is normal  Judgment and thought content normal    Nursing note and vitals reviewed        [address]le DO [address] [facility]

## 2020-01-06 NOTE — LETTER
January 6, 2020     Patient: Natalya Baig   YOB: 1990   Date of Visit: 1/6/2020       To Whom it May Concern:    Natalya Baig is under my professional care  He was seen in my office on 1/6/2020  He may return to work with limitations : allow to wear CAM boot  If unable to accommodate this, then remain out of work until follow up visit in a week       If you have any questions or concerns, please don't hesitate to call           Sincerely,          Roshan Carrasco PA-C        CC: Natalya Rubint

## 2020-01-06 NOTE — PROGRESS NOTES
Assessment/Plan   Diagnoses and all orders for this visit:    Pain, joint, ankle and foot, left  -     XR ankle 3+ vw left; Future  -     MRI ankle/heel left  wo contrast; Future    Sprain of ankle, initial encounter    - Given the recent lateral ankle repair, will check MRI to ensure no re-tear  - High CAM boot fitted and dispensed  - Follow up with Dr Mesha Torres  Subjective   Patient ID: Arden Gottron is a 30 y o  male  Vitals:    01/06/20 1408   BP: 108/71   Pulse: 80     30yo male comes in for an evaluation of his left ankle  He was injured yesterday when he was carrying drawers down the steps  He stepped off the last step and inverted his ankle  He has lateral ankle pain and swelling  He has a history of a recent lateral ankle surgery, 2-3 years ago, by Dr Arnulfo Rodriguez at [facility]  He does not know exactly what the procedure was, but states ligaments and tendons were repaired  The pain is dull in character, mild in severity, pain does not radiate and is not associated with numbness  The following portions of the patient's history were reviewed and updated as appropriate: allergies, current medications, past family history, past medical history, past social history, past surgical history and problem list     Review of Systems  Ortho Exam  History reviewed  No pertinent past medical history    Past Surgical History:   Procedure Laterality Date    ANKLE SURGERY Bilateral     KNEE ARTHROSCOPY Right     KNEE SURGERY Right     IL REVISE MEDIAN N/CARPAL TUNNEL SURG Right 9/20/2018    Procedure: RELEASE CARPAL TUNNEL;  Surgeon: Luis Jacobs MD;  Location:  MAIN OR;  Service: Orthopedics    SYNOVECTOMY Right 9/20/2018    Procedure: TENOSYNOVECTOMY Flexor digitorum superficialis to index, long, ring and small finger; Flexor digitorum profundus to index, long, ring and small finger; Flexor pollicis longus;  Surgeon: Luis Jacobs MD;  Location:  MAIN OR;  Service: Orthopedics    TONSILLECTOMY AND ADENOIDECTOMY       Family History   Problem Relation Age of Onset    No Known Problems Mother     No Known Problems Father      Social History     Occupational History    Not on file   Tobacco Use    Smoking status: Never Smoker    Smokeless tobacco: Never Used   Substance and Sexual Activity    Alcohol use: Yes     Alcohol/week: 3 0 standard drinks     Types: 3 Cans of beer per week     Comment: social    Drug use: No    Sexual activity: Not on file       Review of Systems   Constitutional: Negative  HENT: Negative  Eyes: Negative  Respiratory: Negative  Cardiovascular: Negative  Gastrointestinal: Negative  Endocrine: Negative  Genitourinary: Negative  Musculoskeletal: As below      Allergic/Immunologic: Negative  Neurological: Negative  Hematological: Negative  Psychiatric/Behavioral: Negative  Objective   Physical Exam        I have personally reviewed pertinent films in PACS and my interpretation is no fracture  · Constitutional: Awake, Alert, Oriented  · Eyes: EOMI  · Psych: Mood and affect appropriate  · Heart: regular rate and rhythm  · Lungs: No audible wheezing  · Abdomen: soft  · Lymph: no lymphedema             left ankle:  - Appearance   No swelling, discoloration, deformity, or ecchymosis  - Palpation   + Lateral malleolus tenderness, + ATF tenderness and + proximal fibular tenderness (xray declined)  + anterior ankle tenderness  No tenderness of the medial ankle   - ROM   Full, pain-free, active ROM  - Special Tests   Negative anterior drawer  - Motor   normal 5/5 in all planes  - NVI distally      Susy Linda

## 2020-01-06 NOTE — PATIENT INSTRUCTIONS

## 2020-01-07 ENCOUNTER — OFFICE VISIT (OUTPATIENT)
Dept: OBGYN CLINIC | Facility: CLINIC | Age: 30
End: 2020-01-07
Payer: COMMERCIAL

## 2020-01-07 ENCOUNTER — HOSPITAL ENCOUNTER (OUTPATIENT)
Dept: RADIOLOGY | Age: 30
Discharge: HOME | End: 2020-01-07
Payer: COMMERCIAL

## 2020-01-07 VITALS
SYSTOLIC BLOOD PRESSURE: 121 MMHG | DIASTOLIC BLOOD PRESSURE: 79 MMHG | WEIGHT: 190 LBS | BODY MASS INDEX: 26.6 KG/M2 | HEIGHT: 71 IN | HEART RATE: 94 BPM

## 2020-01-07 DIAGNOSIS — M25.572 PAIN, JOINT, ANKLE AND FOOT, LEFT: ICD-10-CM

## 2020-01-07 DIAGNOSIS — S93.492A SPRAIN OF ANTERIOR TALOFIBULAR LIGAMENT OF LEFT ANKLE, INITIAL ENCOUNTER: Primary | ICD-10-CM

## 2020-01-07 PROCEDURE — 99213 OFFICE O/P EST LOW 20 MIN: CPT | Performed by: ORTHOPAEDIC SURGERY

## 2020-01-07 PROCEDURE — 73721 MRI JNT OF LWR EXTRE W/O DYE: CPT

## 2020-01-07 NOTE — PROGRESS NOTES
PATRICK Casey  Attending, Orthopaedic Surgery  Foot and [facility]      Assessment:     Encounter Diagnosis   Name Primary?  Sprain of anterior talofibular ligament of left ankle, initial encounter Yes       Plan:     The patient has sustained a sprain of the left ATFL and possibly CFL  We will begin physical therapy as soon as the patient tolerates- Order placed  Instructions given for rest, ice 20mins/hr, elevation, and Ace wrap given for compression  The patient was prescribed a CAM boot to be worn for 1 more week, then transition into a supportive sneaker  Work/School restrictions given  Discussed with patient that if he completes at least 6 weeks of physical therapy and he has regained strength but has residual ankle instability, he may require a revision Brostrom procedure to repair the AFTL  He has a positive anterior drawer on exam today  Follow up will be in 7 weeks  Simin Randall MD          Subjective:    Chief Complaint:    Chief Complaint   Patient presents with    Left Ankle - Follow-up        Monroe Opitz is a 30 y o  male referred by Harrison Louie PA-C for evaluation and treatment of an injury to the left ankle  This is evaluated as a personal injury  The injury occurred 2 days ago  He was carrying a heavy piece of furniture and he stepped down a step and inverted his ankle on concrete  The patient states the ankle rolled inward at the time of injury  He did not hear or sense a pop or snap at the time of the injury  The patient notes pain and moderate swelling of the ankle since the injury  He has treated the ankle with ice, ace wrap, Elevation, Rest, CAM boot, Xray  He was seen by Harrison Louie PA-C with orthopedics and she ordered an MRI for further evaluation due to his previous history of ankle surgery  Pain is localized to the anterolateral ankle area  He has sprained this ankle in the past multiple times       Pain/symptom location: the left ankle and foot  Pain/symptom quality: aching and dull  Pain/symptom severity: constant, moderate  Pain/symptom timing:  Worse during the day when active  Pain/symptom conext:  Worse with activites and work  Pain/symptom modifying factors:  Rest makes better, activities make worse  Pain/symptom associated signs/symptoms: none    Outside reports reviewed: none  Foot and Ankle Surgical History:   Brostrom procedure on the left ankle by Dr Kaylin Ribeiro 2-3 years ago  Brostrom and peroneal tendon repair on the right ankle by Dr Kaylin Ribeiro a few years ago  Past Medical, Surgical and Social History:  Past Medical History:  has no past medical history on file  Problem List: does not have any pertinent problems on file  Past Surgical History:  has a past surgical history that includes Ankle surgery (Bilateral); Knee surgery (Right); Knee arthroscopy (Right); pr revise median n/carpal tunnel surg (Right, 9/20/2018); Synovectomy (Right, 9/20/2018); and Tonsillectomy and adenoidectomy  Family History: family history includes No Known Problems in his father and mother  Social History:  reports that he has never smoked  He has never used smokeless tobacco  He reports that he drinks about 3 0 standard drinks of alcohol per week  He reports that he does not use drugs  Current Medications: has a current medication list which includes the following prescription(s): acetaminophen, albuterol, multiple vitamin, and ibuprofen  Allergies: has No Known Allergies       Review of Systems:  General- denies fever/chills  HEENT- denies hearing loss or sore throat  Eyes- denies eye pain or visual disturbances, denies red eyes  Respiratory- denies cough or SOB  Cardio- denies chest pain or palpitations  GI- denies abdominal pain  Endocrine- denies urinary frequency  Urinary- denies pain with urination  Musculoskeletal- Negative except noted above  Skin- denies rashes or wounds  Neurological- denies dizziness or headache  Psychiatric- denies anxiety or difficulty concentrating    Objective:        /79 (BP Location: Right arm, Patient Position: Sitting, Cuff Size: Standard)   Pulse 94   Ht 5' 11" (1 803 m)   Wt 86 2 kg (190 lb)   BMI 26 50 kg/m²   General/Constitutional: No apparent distress: well-nourished and well developed  Eyes: normal ocular motion  Lymphatic: No appreciable lymphadenopathy  Respiratory: Non-labored breathing  Vascular: No edema, swelling or tenderness, except as noted in detailed exam   Integumentary: No impressive skin lesions present, except as noted in detailed exam   Neuro: No ataxia or abnormal movements  Psych: Normal mood and affect, oriented to person, place and time  MSK: normal other than stated in HPI and exam      Gait:  Antalgic  The patient can bear weight on the injured extremity  Left Ankle  Proximal Fibula:   no tenderness noted   Edema: Moderate swelling circumferentially in the ankle   Ecchymosis:   Moderate in lateral ankle   Crepitus  None   Active ROM:  50% of normal    Passive ROM:   75% of normal    Palpation:  Significant tenderness of the anterolateral ligaments   Stability :   Positive anterior drawer test   Syndosmosis:   syndesmotic ligament IS NOT tender   Sensation:    Intact in all distributions   Pulses:  normal DP and PT pulses       Imaging  X-ray of the ankle/foot: 3 views of the ankle reveal a stable mortise joint, moderate soft tissue swelling, and no evidence of fracture  Reviewed by me personally      MRI of the left ankle demonstrates chronically torn and scarred ATFL and CFL and a moderate ankle joint effusion    Scribe Attestation    I,:   Nettie Hoang PA-C am acting as a scribe while in the presence of the attending physician :        I,:   Daphnie Holbrook MD personally performed the services described in this documentation    as scribed in my presence :

## 2020-01-07 NOTE — PATIENT INSTRUCTIONS
Continue wearing the cam boot for 1 more week and then transition into a supportive sneaker  Begin physical therapy without any restrictions from our perspective  You have sprained your ankle  Over the next 4 weeks it is important you follow these instructions; Lateral Ankle Sprain Protocol - [facility] and Ankle  Acute Phase: Days 1-3  Goals: Decrease pain and swelling, protect from further injury  · Pain and swelling management (RICE)  · Protection of injured ligaments (taping, splints, casting, walking boot etc )  · Gait-WBAT  Sub-Acute Phase: 2-4 days to 2 weeks  Goals: Decrease/eliminate pain, increase ROM, decrease swelling, increase strength  · Continue pain and swelling management  · Subtalar and talocrurcal joint mobilizations  · ROM with pain-free range: DF/PF/EV/IV AROM, calf stretching  · Increase weight bearing of affected extremity during gait  · Isometric strengthening  Rehabilitative Phase: 2-6 weeks  Goals: Regain ROM and strength, increase endurance and proprioception  · Continue joint mobilizations and stretching  · Progress to pain-free concentric and eccentric strengthening exercises (both open chain and closed chain)  · Proprioception exercises (balance board, BAPS board, single leg stance etc )  · Gait training-promote equal weight beraing and weaning of assistive devices  · Endurance activities (stationary biking, swimming, walking, etc )  Functional Phase: 6 weeks  Goals: Return to full activity and function  · Continue strengthening exercises  · Coordination and agility training-depends on patient's prior level of function, recreational activities, and goals         Treating your Sprained Ankle  Treating your sprained ankle properly may prevent chronic pain and instability  For a Grade I sprain, follow the R I C E  guidelines:    · Rest your ankle by not walking on it  Limit weight bearing   Use crutches if necessary; if there is no fracture you are safe to put some weight on the leg  An ankle brace often helps control swelling and adds stability while the ligaments are healing  · Ice it to keep down the swelling  Don't put ice directly on the skin (use a thin piece of cloth such as a pillow case between the ice bag and the skin) and don't ice more than 20 minutes at a time to avoid frost bite  · Compression can help control swelling as well as immobilize and support your injury  · Elevate the foot by reclining and propping it up above the waist or heart as needed     Swelling usually goes down with a few days  For a Grade II sprain, follow the R I C E  guidelines and allow more time for healing  A doctor may immobilize or splint your sprained ankle  A Grade III sprain puts you at risk for permanent ankle instability  Rarely, surgery may be needed to repair the damage, especially in competitive athletes  For severe ankle sprains, your doctor may also consider treating you with a short leg cast for two to three weeks or a walking boot  People who sprain their ankle repeatedly may also need surgical repair to tighten their ligaments  Rehabilitating your Sprained Ankle  Every ligament injury needs rehabilitation  Otherwise, your sprained ankle might not heal completely and you might re-injure it  All ankle sprains, from mild to severe, require three phases of recovery:    · Phase I includes resting, protecting and reducing swelling of your injured ankle  · Phase II includes restoring your ankle's flexibility, range of motion and strength  · Phase III includes gradually returning to straight-ahead activity and doing maintenance exercises, followed later by more cutting sports such as tennis, basketball or football    Once you can stand on your ankle again, your doctor will prescribe exercise routines to strengthen your muscles and ligaments and increase your flexibility, balance and coordination   Later, you may walk, jog and run figure eights with your ankle taped or in a supportive ankle brace  It's important to complete the rehabilitation program because it makes it less likely that you'll hurt the same ankle again  If you don't complete rehabilitation, you could suffer chronic pain, instability and arthritis in your ankle  If your ankle still hurts, it could mean that the sprained ligament has not healed right, or that some other injury also happened  To prevent future sprained ankles, pay attention to your body's warning signs to slow down when you feel pain or fatigue, and stay in shape with good muscle balance, flexibility and strength in your soft tissues  Ankle Sprain     What is an ankle sprain? An ankle sprain refers to tearing of the ligaments of the ankle  The most common ankle sprain occurs on the lateral or outside part of the ankle  This is an extremely common injury which affects many people during a wide variety of activities  It can happen in the setting of an ankle fracture (i e  when the bones of the ankle also break)  Most commonly, however, it occurs in isolation  What are the symptoms an ankle sprain? Patients report pain after having twisted an ankle  This usually occurs due to an inversion injury, which means the foot rolls underneath the ankle or leg  It commonly occurs during sports  Patients will complain of pain on the outside of their ankle and various degrees of swelling and bleeding under the skin (i e  bruising)  Technically, this bruising is referred to as ecchymosis  Depending on the severity of the sprain, a person may or may not be able to put weight on the foot     What are the risk factors for an ankle sprain? As noted above, these injuries occur when the ankle is twisted underneath the leg, called inversion  Risk factors are those activities, such as basketball and jumping sports, in which an athlete can come down on and turn the ankle or step on an opponent's foot      Some people are predisposed to ankle sprains   In people with a hindfoot varus, which means that the general nature or posture of the heels is slightly turned toward the inside, these injuries are more common  This is because it is easier to turn on the ankle      In those who have had a severe sprain in the past, it is also easier to turn the ankle and cause a new sprain  Therefore, one of the risk factors of spraining the ankle is having instability  Those who have weak muscles, especially those called the peroneals which run along the outside of the ankle, may be more predisposed    Anatomy    There are multiple ligaments in the ankle  Ligaments in general are those structures that connect bone-to-bone  Tendons, on the other hand, connect muscle-to-bone and allow those muscles to exert their force  In the case of an ankle sprain, there are several commonly sprained ligaments  The two most important are the following:            ?  ????       1  The ATFL or anterior talofibular ligament, which connects the talus to the fibula on the outside of the ankle       2  The CFL or calcaneal fibular ligament, which connects the fibula to the calcaneus below        3  Finally, there is a third ligament which is not as commonly torn  It runs more in the back of the ankle and is called the PTFL or posterior talofibular ligament  These must be differentiated from the so-called high ankle sprain ligaments, which are completely different and located higher up the leg  How is an ankle sprain diagnosed? Ankle sprains can be diagnosed fairly easily given that they are common injuries  The location of pain on the outside of the ankle with tenderness and swelling in a patient who has an ankle with inversion is very suggestive   In these patients, normal X-rays also suggest that the bone has not been broken and instead the ankle ligaments have been torn or sprained      It is very important, however, not to simply regard any injury as an ankle sprain because other injuries can occur as well  For example, the peroneal tendons mentioned above can be torn  There can also be fractures in other bones around the ankle including the fifth metatarsal and the anterior process of the calcaneus  In very severe cases, an MRI may be warranted to rule out other problems in the ankle such as damage to the cartilage  An MRI typically is not necessary to diagnose a sprain     What are treatment options? Surgery is not required in the vast majority of ankle sprains  Even in severe sprains, these ligaments will heal without surgery  The grade of the sprain will dictate treatment  Sprains are traditionally classified into several grades  Perhaps more important, however, is the patients ability to bear weight  Those that can bear weight even after the injury are likely to return very quickly to play  Those who cannot walk may need to be immobilized      In general, treatment in the first 48 to 72 hours consists of resting the ankle, icing 20 minutes every two to three hours, compressing with an ACE wrap, and elevating, which means positioning the leg and ankle so that the toes are above the level of patients nose  Those patients who cannot bear weight are better treated in a removable walking boot until they can comfortably bear weight      Physical therapy is a mainstay  Patients should learn to strengthen the muscles around the ankle, particularly the peroneals  An ankle brace can be used in an athlete until a therapist believes that the ankle is strong enough to return to play without it  Surgery is rarely indicated but may be needed in a patient who has cartilage damage or other related injuries  Ligaments are only repaired or strengthened in cases of chronic instability in which the ligaments have healed but not in a strong fashion  How long is recovery? Recovery depends on the severity of the injury   As noted above, for those minor injuries, people can return to their activities in sports within several days  For very severe sprains, it may take longer and up to several weeks  It should be noted that high ankle sprains take considerably longer to heal      Outcomes for ankle sprains are generally quite good  Most patients heal from an ankle sprain and are able to get back to their normal lives, sports and activities  Some people, however, who do not properly rehab their ankle and have a rather severe sprain may go on to have ankle instability  Chronic instability occurs in patients repeatedly spraining the ankle  Such repeated episodes can be dangerous because they can lead to damage within the ankle  These patients should be identified and considered for repair     Potential Complications    Surgery is rarely needed  As noted above, however, an improperly rehabbed ankle may end up having chronic instability  It is important to address this with either therapy or surgery before further damage occurs to the ankle  Frequently Asked Questions    What is a high ankle sprain and is that different from a regular ankle sprain? A high ankle sprain refers to tearing of the ligaments that connect the tibia to the fibula (this connection is also called the syndesmosis)  These are different and much less common than the standard lateral ankle sprains, meaning those that occur on the side of the ankle       Do ankle sprains ever need to be repaired acutely? Ankle sprains rarely, if ever, needed to be treated with surgery  The vast majority simply need to be treated with rest, ice, compression and elevation followed by physical therapy and temporary bracing       I have sprained my ankle many times  Should I be concerned? Yes  The more you sprain an ankle, the greater the chance that problems will develop  For example, turning the ankle can lead to damage to the cartilage inside the ankle joint  You should see your doctor if this is occurring

## 2020-01-07 NOTE — LETTER
January 7, 2020     Patient: Kathie Litten   YOB: 1990   Date of Visit: 1/7/2020       To Whom it May Concern:    Kathie Litten is under my professional care  He was seen in my office on 1/7/2020  He may return to work on 1/20/2020 without restrictions  If you have any questions or concerns, please don't hesitate to call           Sincerely,          Caden Butcher MD        CC: No Recipients

## 2020-01-14 ENCOUNTER — EVALUATION (OUTPATIENT)
Dept: PHYSICAL THERAPY | Facility: REHABILITATION | Age: 30
End: 2020-01-14
Payer: COMMERCIAL

## 2020-01-14 DIAGNOSIS — S93.492A SPRAIN OF ANTERIOR TALOFIBULAR LIGAMENT OF LEFT ANKLE, INITIAL ENCOUNTER: ICD-10-CM

## 2020-01-14 DIAGNOSIS — M25.572 ACUTE LEFT ANKLE PAIN: Primary | ICD-10-CM

## 2020-01-14 PROCEDURE — 97161 PT EVAL LOW COMPLEX 20 MIN: CPT | Performed by: PHYSICAL THERAPIST

## 2020-01-14 PROCEDURE — 97110 THERAPEUTIC EXERCISES: CPT | Performed by: PHYSICAL THERAPIST

## 2020-01-14 NOTE — PROGRESS NOTES
PT Evaluation     Today's date: 2020  Patient name: Francheska Mosqueda  : 1990  MRN: [id number]  Referring provider: Dianna Saunders  Dx:   Encounter Diagnosis     ICD-10-CM    1  Acute left ankle pain M25 572    2  Sprain of anterior talofibular ligament of left ankle, initial encounter W14 874A Ambulatory referral to Physical Therapy                  Assessment  Assessment details: Francheska Mosqueda is a pleasant 30 y o  male who presents with subacute left ankle pain  The patient's greatest concerns are concern at no signs of improvement, fear of not being able to keep active and future ill health (and wanting to prevent it)  No further referral appears necessary at this time based upon examination results  Primary movement impairment diagnosis of bilateral ankle hypermobility, resulting in pathoanatomical symptoms of subacute left ankle pain, which is limting his ability to walk, run, work his job as a , [employer] bear, and perform other activities of daily living independently  The patient has a chronic, recurring ankle instability history, with prior surgeries on both ankles  He had an acute on chronic inversion ankle sprain of his left ankle, which has now left him with current impairments that are listed below  Upon evaluation, he is in a highly irritable state with global ROM limitations of the foot ankle complex secondary to pain as well as weight bearing intolerance and overall functional limitations  During rehabilitation, he will need to focus on restoring proprioception of his lower extremities, as well as address any other impairments that may be contributing to his recurring ankle sprains  The patient would benefit from skilled PT to address his current deficits, improve his QOL, and restore his PLOF       Primary Impairments:  1) pain limiting function  2) ankle hypermobility   3) poor proprioception    Etiologic factors include recent ankle inversion sprain when descending stairs at home  Impairments: abnormal coordination, abnormal gait, abnormal muscle firing, abnormal muscle tone, abnormal or restricted ROM, abnormal movement, activity intolerance, difficulty understanding, impaired balance, impaired physical strength, lacks appropriate home exercise program, pain with function, weight-bearing intolerance, poor posture  and poor body mechanics    Symptom irritability: highUnderstanding of Dx/Px/POC: good   Prognosis: good  Prognosis details: Positive prognostic indicators include positive attitude toward recovery  Negative prognostic indicators include chronicity of symptoms, anxiety, hypertension, history of chronic ankle instability  Goals  Impairment Based Goals: (6 weeks)   Patient will restore ankle ROM to [facility]   Patient will improve LE strength to [facility]   Patient will improve FOTO greater than predicted outcome   Patient will restore normalized gait pattern   Functional Based Goals: (8 weeks)   Patient will be independent with home exercise program    Patient will be able to manage symptoms independently  Patient will be able to return to work without limitations of left ankle pain   Patient will be able to return to negotiating stairs without limitations of left ankle pain    Plan  Plan details: Prognosis above is given PT services 2x/week tapering to 1x/week over the next 2 months and home program adherence    Patient would benefit from: skilled physical therapy  Planned modality interventions: thermotherapy: hydrocollator packs  Planned therapy interventions: activity modification, joint mobilization, manual therapy, motor coordination training, neuromuscular re-education, patient education, self care, therapeutic activities, therapeutic exercise, graded activity, home exercise program, behavior modification, gait training, graded exercise, graded motor, functional ROM exercises, flexibility, stretching, strengthening, balance/weight bearing training and balance  Plan of Care beginning date: 2020  Plan of Care expiration date: 3/10/2020  Treatment plan discussed with: patient        Subjective Evaluation    History of Present Illness  Mechanism of injury: Patient reports that he was carrying some furniture down steps at home this past , and turned his ankle in on the last step  Reports that he saw Dr Jason Price, who put him in a boot for 6 weeks, and then to follow up with PT  Reports he had surgery on both of his ankles, for recurrent ankle instability  Reports that he also had a right meniscus tear on his right knee in   His right ankle surgery was about 5 years ago and for the left foot was 2-3 years ago  He states that giving way and rolling ankles has always been a problem of his  Pain  Current pain ratin  At best pain ratin  At worst pain ratin  Quality: sharp and burning  Relieving factors: ice, change in position, relaxation and rest    Patient Goals  Patient goals for therapy: decreased edema, improved balance, increased strength, independence with ADLs/IADLs and return to sport/leisure activities          Objective     General Comments: Ankle/Foot Comments   Functional Limitations: walking, weight bearing, working his job as a      Standing Posture: off weighted left lower extremity with weight shift towards the right lower extremity      Muscle Length: to be assessed future visits      Squat: assess future visit  Currently highly irritable with poor weight bearing tolerance      Gait: antalgic gait with decreased stance phase of left lower extremity as well as decreased stride and step length    Ankle Figure 8 Girth Measurement:   R: 52 cm  L: 54 cm     Single Limb Stance: assess future visit   Currently highly irritable with poor weight bearing tolerance      Range of Motion:   Dorsiflexion: R  WNL  L -5 a/ -5 p (with pain)  Plantarflexion: R  WNL             L 30 a/ 25 p (with pain)  Inversion: R   WNL                     L 4 a/8 p (with pain)  Eversion: R    WNL                      L 4 a/ 5 p (with pain)    Palpation: tenderness to palpation of left ATFL and CFL      Manual Muscle Testing:  assess future visit   Currently highly irritable                 Precautions: WBAT left lower extremity       Manual  1 14            L ankle DF MWM OKC/CKC             L ankle PROM                                                        Exercise Diary  1 14            Ankle abcs  2 rounds            4 way ankle  10x ea            Ankle pumps  30x            Alter G              Seated heel raises/toe raises             Towel curls 10x5"            Short foot             BAPS board 3 ways             SLS with progressions             DF to wall dynamic                                                                                                                                                   Modalities

## 2020-01-16 ENCOUNTER — OFFICE VISIT (OUTPATIENT)
Dept: PHYSICAL THERAPY | Facility: REHABILITATION | Age: 30
End: 2020-01-16
Payer: COMMERCIAL

## 2020-01-16 DIAGNOSIS — M25.572 ACUTE LEFT ANKLE PAIN: Primary | ICD-10-CM

## 2020-01-16 DIAGNOSIS — S93.492A SPRAIN OF ANTERIOR TALOFIBULAR LIGAMENT OF LEFT ANKLE, INITIAL ENCOUNTER: ICD-10-CM

## 2020-01-16 PROCEDURE — 97112 NEUROMUSCULAR REEDUCATION: CPT | Performed by: PHYSICAL THERAPIST

## 2020-01-16 PROCEDURE — 97140 MANUAL THERAPY 1/> REGIONS: CPT | Performed by: PHYSICAL THERAPIST

## 2020-01-16 PROCEDURE — 97110 THERAPEUTIC EXERCISES: CPT | Performed by: PHYSICAL THERAPIST

## 2020-01-16 NOTE — PROGRESS NOTES
Daily Note     Today's date: 2020  Patient name: Dylon Grider  : 1990  MRN: [id number]  Referring provider: Yulissa Ibrahim  Dx:   Encounter Diagnosis     ICD-10-CM    1  Acute left ankle pain M25 572    2  Sprain of anterior talofibular ligament of left ankle, initial encounter S91 105A                   Subjective: patient states he felt a pop in his ankle yesterday when walking, and since then he has had more pain with walking mostly, and will feel clicking where he sprained his ankle  Also states that he feels some shooting pain occasionally down towards his toes  Objective: See treatment diary below      Assessment: Patient was in a highly irritable state today, so PT focused on low grade joint mobilizations as well as passive range of motion and modalities  The patient was also educated on a new brace that he could purchase that is more rigid and may provide more stability to the ankle and foot  He was also educated that if he continues to have more pain with weight bearing, he may need to use a temporary assistive device to off load his left lower extremity  He would continue to benefit from skilled PT to address his current deficits as well as improve his QOL and restore his PLOF  Plan: Continue per plan of care        Precautions: WBAT left lower extremity       Manual  1 14 1 16           L ankle DF MWM OKC/CKC  SE OKC 5' grade II           L ankle PROM SE 10'  SE 10'                                                       Exercise Diary  1 14 1 16           Ankle abcs  2 rounds 2 rounds           4 way ankle  10x ea 30x ea           Ankle pumps  30x 3x1'           Alter G              Seated heel raises/toe raises             Towel curls 10x5" 20x5"           Short foot             BAPS board 3 ways  20x ea way           SLS with progressions             DF to wall dynamic Modalities  1 16            Cryotherapy L ankle  10'            L ankle taping  performed

## 2020-01-21 ENCOUNTER — APPOINTMENT (OUTPATIENT)
Dept: PHYSICAL THERAPY | Facility: REHABILITATION | Age: 30
End: 2020-01-21
Payer: COMMERCIAL

## 2020-01-23 ENCOUNTER — OFFICE VISIT (OUTPATIENT)
Dept: PHYSICAL THERAPY | Facility: REHABILITATION | Age: 30
End: 2020-01-23
Payer: COMMERCIAL

## 2020-01-23 DIAGNOSIS — M25.572 ACUTE LEFT ANKLE PAIN: Primary | ICD-10-CM

## 2020-01-23 DIAGNOSIS — S93.492A SPRAIN OF ANTERIOR TALOFIBULAR LIGAMENT OF LEFT ANKLE, INITIAL ENCOUNTER: ICD-10-CM

## 2020-01-23 PROCEDURE — 97140 MANUAL THERAPY 1/> REGIONS: CPT

## 2020-01-23 PROCEDURE — 97112 NEUROMUSCULAR REEDUCATION: CPT

## 2020-01-23 PROCEDURE — 97110 THERAPEUTIC EXERCISES: CPT

## 2020-01-23 NOTE — PROGRESS NOTES
Daily Note     Today's date: 2020  Patient name: Gadiel Rice  : 1990  MRN: [id number]  Referring provider: Pillo Mercedes  Dx:   Encounter Diagnosis     ICD-10-CM    1  Acute left ankle pain M25 572    2  Sprain of anterior talofibular ligament of left ankle, initial encounter S96 685A                   Subjective: Pt reports some increased soreness due to return to work this week, but states that ankle is feeling better overall  Objective: See treatment diary below      Assessment: Tolerated treatment well  Patient would benefit from continued PT   Pt  able to complete all exercises with no increase in pain during or after session  Pt able to progress exercises this session without complaint  Pt still has most pain with inversion, followed by dorsiflexion  Pt  1:1 with PTA for entirety  Plan: Continue per plan of care        Precautions: WBAT left lower extremity       Manual  1 14 1 16           L ankle DF MWM OKC/CKC  SE OKC 5' grade II np          L ankle PROM SE 10'  SE 10'  KP 10'                                                     Exercise Diary  1 14 1 16           Ankle abcs  2 rounds 2 rounds 2x          4 way ankle  10x ea 30x ea 30x ea          Ankle pumps  30x 3x1' 3x1'          Alter G              Seated heel raises/toe raises             Towel curls 10x5" 20x5" 2x1'          Short foot             BAPS board 3 ways  20x ea way 25x ea          SLS with progressions             DF to wall dynamic             Bike   5'                                                                                                                                   Modalities  1 16            Cryotherapy L ankle  10'            L ankle taping  performed

## 2020-01-28 ENCOUNTER — OFFICE VISIT (OUTPATIENT)
Dept: PHYSICAL THERAPY | Facility: REHABILITATION | Age: 30
End: 2020-01-28
Payer: COMMERCIAL

## 2020-01-28 DIAGNOSIS — S93.492A SPRAIN OF ANTERIOR TALOFIBULAR LIGAMENT OF LEFT ANKLE, INITIAL ENCOUNTER: ICD-10-CM

## 2020-01-28 DIAGNOSIS — M25.572 ACUTE LEFT ANKLE PAIN: Primary | ICD-10-CM

## 2020-01-28 PROCEDURE — 97112 NEUROMUSCULAR REEDUCATION: CPT | Performed by: PHYSICAL THERAPIST

## 2020-01-28 PROCEDURE — 97110 THERAPEUTIC EXERCISES: CPT | Performed by: PHYSICAL THERAPIST

## 2020-01-28 PROCEDURE — 97140 MANUAL THERAPY 1/> REGIONS: CPT | Performed by: PHYSICAL THERAPIST

## 2020-01-28 NOTE — PROGRESS NOTES
Daily Note     Today's date: 2020  Patient name: Neisha Marley  : 1990  MRN: [id number]  Referring provider: Bernard Reddy  Dx:   Encounter Diagnosis     ICD-10-CM    1  Acute left ankle pain M25 572    2  Sprain of anterior talofibular ligament of left ankle, initial encounter W72 114D                   Subjective: Patient reports he came down on his foot pretty hard at work on Monday, but did not roll it  Reports the same pain in his ankle  Objective: See treatment diary below      Assessment: Has been more irritable when presenting to physical therapy due to coming straight from work where he spends all day on his feet  The early mobilization has been good for him as his tolerance for weight bearing has not only improved, but he has also had an improvement in his range of motion  He still does have moderate to high symptom irritability when passively and actively moving the ankle, mostly into inversion, plantar flexion, and eversion as well  He was educated to continue with his exercises and icing at home, and that he will be transitioning to more proprioceptive based exercises  He would continue to benefit from skilled physical therapy to address his current deficits, improve his quality of life, and restore his prior level of function  Plan: Continue per plan of care        Precautions: WBAT left lower extremity       Manual  1 14 1 16          L ankle DF MWM OKC/CKC  SE OKC 5' grade II np SE 5'         L ankle PROM SE 10'  SE 10'  KP 10' SE 10'                                                    Exercise Diary  1 14 1 16          Ankle abcs  2 rounds 2 rounds 2x 2x         4 way ankle  10x ea 30x ea 30x ea 30x ea         Ankle pumps  30x 3x1' 3x1' 3x1'          Alter G              Seated heel raises/toe raises             Towel curls 10x5" 20x5" 2x1' 2x1'         Short foot             BAPS board 3 ways  20x ea way 25x ea 25x ea         SLS with progressions DF to wall dynamic             Bike   5' 8'                                                                                                                                  Modalities  1 16            Cryotherapy L ankle  10'            L ankle taping  performed

## 2020-01-30 ENCOUNTER — APPOINTMENT (OUTPATIENT)
Dept: PHYSICAL THERAPY | Facility: REHABILITATION | Age: 30
End: 2020-01-30
Payer: COMMERCIAL

## 2020-02-03 ENCOUNTER — OFFICE VISIT (OUTPATIENT)
Dept: PHYSICAL THERAPY | Facility: REHABILITATION | Age: 30
End: 2020-02-03
Payer: COMMERCIAL

## 2020-02-03 DIAGNOSIS — M25.572 ACUTE LEFT ANKLE PAIN: Primary | ICD-10-CM

## 2020-02-03 DIAGNOSIS — S93.492A SPRAIN OF ANTERIOR TALOFIBULAR LIGAMENT OF LEFT ANKLE, INITIAL ENCOUNTER: ICD-10-CM

## 2020-02-03 PROCEDURE — 97110 THERAPEUTIC EXERCISES: CPT

## 2020-02-03 PROCEDURE — 97140 MANUAL THERAPY 1/> REGIONS: CPT

## 2020-02-03 PROCEDURE — 97112 NEUROMUSCULAR REEDUCATION: CPT

## 2020-02-03 NOTE — PROGRESS NOTES
Daily Note     Today's date: 2/3/2020  Patient name: Dylon Grider  : 1990  MRN: [id number]  Referring provider: Yulissa Ibrahim  Dx:   Encounter Diagnosis     ICD-10-CM    1  Acute left ankle pain M25 572    2  Sprain of anterior talofibular ligament of left ankle, initial encounter S93 492A        Start Time: 1720  Stop Time: 1800  Total time in clinic (min): 40 minutes    Subjective: Patient notes a slight improvement with the intensity of his pain  Compliant with his HEP  Objective: See treatment diary below    Assessment: Patient continues to have a "burning" pain during PROM DF  He is able to complete all TE with no increase in pain  He seems to be responding well to the current PT POC noting improved foto score and subjective comments  Plan: Continue per plan of care        Precautions: WBAT left lower extremity     Manual  1 14 1 16  2/3        L ankle DF MWM OKC/CKC  SE OKC 5' grade II np SE 5'         L ankle PROM SE 10'  SE 10'  KP 10' SE 10' JM 10'                                                 Exercise Diary  1 14 1 16  2/3        Ankle abcs  2 rounds 2 rounds 2x 2x 2x        4 way ankle  10x ea 30x ea 30x ea 30x ea 30x ea        Ankle pumps  30x 3x1' 3x1' 3x1'  3x1'        Alter G              Seated heel raises/toe raises             Towel curls 10x5" 20x5" 2x1' 2x1' 3x1'        Short foot             BAPS board 3 ways  20x ea way 25x ea 25x ea 25x ea        SLS with progressions             DF to wall dynamic             Bike   5' 8' 8'                                                                                                                               Modalities  1 16            Cryotherapy L ankle  10'            L ankle taping  performed

## 2020-02-06 ENCOUNTER — OFFICE VISIT (OUTPATIENT)
Dept: PHYSICAL THERAPY | Facility: REHABILITATION | Age: 30
End: 2020-02-06
Payer: COMMERCIAL

## 2020-02-06 DIAGNOSIS — S93.492A SPRAIN OF ANTERIOR TALOFIBULAR LIGAMENT OF LEFT ANKLE, INITIAL ENCOUNTER: ICD-10-CM

## 2020-02-06 DIAGNOSIS — M25.572 ACUTE LEFT ANKLE PAIN: Primary | ICD-10-CM

## 2020-02-06 PROCEDURE — 97112 NEUROMUSCULAR REEDUCATION: CPT

## 2020-02-06 PROCEDURE — 97110 THERAPEUTIC EXERCISES: CPT

## 2020-02-06 NOTE — PROGRESS NOTES
Daily Note     Today's date: 2020  Patient name: El Meredith  : 1990  MRN: [id number]  Referring provider: Princess Chávez  Dx:   Encounter Diagnosis     ICD-10-CM    1  Acute left ankle pain M25 572    2  Sprain of anterior talofibular ligament of left ankle, initial encounter S93 492A        Start Time:   Stop Time:   Total time in clinic (min): 40 minutes    Subjective: Patient states, "I rolled my ankle coming out of the shower about 1 hour ago  It hurts a little more and is swollen"  Torey Miller spoke with patient and said he could ice and elevate and take an anti-inflammatory medication  If the symptoms do not subside by next therapy session further investigation will be taken by PT  Objective: See treatment diary below    Assessment: Patient had increased pain during select exercises held below and CW/CCW baps board  Patient able to complete all other TE with no increased pain  Resume all TE if symptoms subside NV  PROM most likely would have irritated or caused more inflammation, therefore, I held PROM this visit  Plan: Continue per plan of care        Precautions: WBAT left lower extremity     Manual  1 14 1 16  2/3 2/6       L ankle DF MWM OKC/CKC  SE OKC 5' grade II np SE 5'         L ankle PROM SE 10'  SE 10'  KP 10' SE 10' JM 10' nv                                                Exercise Diary  1 14 1 16  2/3 2/6       Ankle abcs  2 rounds 2 rounds 2x 2x 2x 1x pain         4 way ankle  10x ea 30x ea 30x ea 30x ea 30x ea held       Ankle pumps  30x 3x1' 3x1' 3x1'  3x1' 3x1'       Alter G              Seated heel raises/toe raises             Towel curls 10x5" 20x5" 2x1' 2x1' 3x1' 3x1'       Short foot             BAPS board 3 ways  20x ea way 25x ea 25x ea 25x ea 25x       SLS with progressions             DF to wall dynamic             Bike   5' 8' 8' 8' Modalities  1 16     2/6       Cryotherapy L ankle  10'     10'       L ankle taping  performed

## 2020-02-10 ENCOUNTER — APPOINTMENT (OUTPATIENT)
Dept: PHYSICAL THERAPY | Facility: REHABILITATION | Age: 30
End: 2020-02-10
Payer: COMMERCIAL

## 2020-02-13 ENCOUNTER — APPOINTMENT (OUTPATIENT)
Dept: PHYSICAL THERAPY | Facility: REHABILITATION | Age: 30
End: 2020-02-13
Payer: COMMERCIAL

## 2020-02-27 ENCOUNTER — OFFICE VISIT (OUTPATIENT)
Dept: OBGYN CLINIC | Facility: CLINIC | Age: 30
End: 2020-02-27
Payer: COMMERCIAL

## 2020-02-27 VITALS
BODY MASS INDEX: 26.5 KG/M2 | HEART RATE: 86 BPM | SYSTOLIC BLOOD PRESSURE: 132 MMHG | HEIGHT: 71 IN | DIASTOLIC BLOOD PRESSURE: 84 MMHG

## 2020-02-27 DIAGNOSIS — M25.372 ANKLE INSTABILITY, LEFT: ICD-10-CM

## 2020-02-27 DIAGNOSIS — S93.492A SPRAIN OF ANTERIOR TALOFIBULAR LIGAMENT OF LEFT ANKLE, INITIAL ENCOUNTER: Primary | ICD-10-CM

## 2020-02-27 PROCEDURE — 1036F TOBACCO NON-USER: CPT | Performed by: ORTHOPAEDIC SURGERY

## 2020-02-27 PROCEDURE — 99213 OFFICE O/P EST LOW 20 MIN: CPT | Performed by: ORTHOPAEDIC SURGERY

## 2020-02-27 NOTE — PROGRESS NOTES
PATRICK Garcia  Attending, Orthopaedic Surgery  [facility] [address] [facility]      ORTHOPAEDIC FOOT AND ANKLE CLINIC VISIT     Assessment:     Encounter Diagnoses   Name Primary?  Sprain of anterior talofibular ligament of left ankle, initial encounter Yes    Ankle instability, left             Plan:   · The patient verbalized understanding of exam findings and treatment plan  We engaged in the shared decision-making process and treatment options were discussed at length with the patient  Surgical and conservative management discussed today along with risks and benefits  · Santhosh Larson reports continued ankle instability and states he reinjured the ankle 6 times in the past 3 weeks  He has only completed 3 weeks of PT since last visit  · He still has significant weakness in the left ankle with a positive anterior drawer  · We recommend continuing formal physical therapy  · If he is able to regain his strength and continues to have ankle instability and pain, we will offer him a revision Brostrom procedure using an internal brace  · Continue ASO ankle brace at all times for stability  · Continue RICE modalities and tylenol/NSAIDs as needed for pain  Return in about 8 weeks (around 4/23/2020)  for reevaluation of left ankle       History of Present Illness:   Chief Complaint:   Chief Complaint   Patient presents with    Left Ankle - Follow-up     Vini Kinney is a 30 y o  male who is being seen in follow-up for left ankle pain and instability  When we last saw he we recommended PT, RICE modalities  Patient completed 3 weeks of PT but state he discontinued the PT after he re-injured his ankle with an inversion injury  Since then, he has re-injured the ankle 6 times  Pain has not improved  Residual pain is localized at lateral ankle with minimal radiating and described as sharp and severe        Pain/symptom timing:  Worse during the day when active  Pain/symptom context:  Worse with activites and work  Pain/symptom modifying factors:  Rest makes better, activities make worse  Pain/symptom associated signs/symptoms: none    Prior treatment   · NSAIDsYes   · Injections No   · Bracing/Orthotics Yes    · Physical Therapy Yes     Orthopedic Surgical History:   Brostrom procedure on the left ankle by Dr Aminah Sargent 2-3 years ago  Brostrom and peroneal tendon repair on the right ankle by Dr Aminah Sargent a few years ago         Past Medical, Surgical and Social History:  Past Medical History:  has no past medical history on file  Problem List: does not have any pertinent problems on file  Past Surgical History:  has a past surgical history that includes Ankle surgery (Bilateral); Knee surgery (Right); Knee arthroscopy (Right); pr revise median n/carpal tunnel surg (Right, 9/20/2018); Synovectomy (Right, 9/20/2018); and Tonsillectomy and adenoidectomy  Family History: family history includes No Known Problems in his father and mother  Social History:  reports that he has never smoked  He has never used smokeless tobacco  He reports that he drinks about 3 0 standard drinks of alcohol per week  He reports that he does not use drugs  Current Medications: has a current medication list which includes the following prescription(s): acetaminophen, multiple vitamin, and ibuprofen  Allergies: has No Known Allergies       Review of Systems:  General- denies fever/chills  HEENT- denies hearing loss or sore throat  Eyes- denies eye pain or visual disturbances, denies red eyes  Respiratory- denies cough or SOB  Cardio- denies chest pain or palpitations  GI- denies abdominal pain  Endocrine- denies urinary frequency  Urinary- denies pain with urination  Musculoskeletal- Negative except noted above  Skin- denies rashes or wounds  Neurological- denies dizziness or headache  Psychiatric- denies anxiety or difficulty concentrating    Physical Exam:   /84 (BP Location: Left arm, Patient Position: Sitting, Cuff Size: Adult)   Pulse 86   Ht 5' 11" (1 803 m)   BMI 26 50 kg/m²   General/Constitutional: No apparent distress: well-nourished and well developed  Eyes: normal ocular motion  Lymphatic: No appreciable lymphadenopathy  Respiratory: Non-labored breathing  Vascular: No edema, swelling or tenderness, except as noted in detailed exam   Integumentary: No impressive skin lesions present, except as noted in detailed exam   Neuro: No ataxia or tremors noted  Psych: Normal mood and affect, oriented to person, place and time  Appropriate affect  Musculoskeletal: Normal, except as noted in detailed exam and in HPI  Examination    Left    Gait Antalgic   Musculoskeletal Tender to palpation at ATFL    Skin Normal   Well-healed incisions  Nails Normal    Range of Motion  20 degrees dorsiflexion, 40 degrees plantarflexion  Subtalar motion: normal    Stability Stable    Muscle Strength 3/5 tibialis anterior  3/5 gastrocnemius-soleus  3/5 posterior tibialis  3/5 peroneal/eversion strength  5/5 EHL  5/5 FHL    Neurologic Normal    Sensation Intact to light touch throughout sural, saphenous, superficial peroneal, deep peroneal and medial/lateral plantar nerve distributions  [place] 5 07 filament (10g) testing deferred  Cardiovascular Brisk capillary refill < 2 seconds,intact DP and PT pulses    Special Tests Positive anterior drawer      Imaging Studies:   No new imaging    Scribe Attestation    I,:   Nettie Hoang PA-C am acting as a scribe while in the presence of the attending physician :        I,:   Daphnie Holbrook MD personally performed the services described in this documentation    as scribed in my presence :                Jeralyn Hales Lachman, MD  Foot & Ankle Surgery   Department [address]      I personally performed the service  Jeralyn Hales Lachman, MD

## 2020-03-04 NOTE — PROGRESS NOTES
PT Discharge    Today's date: 3/4/2020  Patient name: Rubin Duarte  : 1990  MRN: [id number]  Referring provider: Yuval Samuels  Dx:   Encounter Diagnosis     ICD-10-CM    1  Acute left ankle pain M25 572    2  Sprain of anterior talofibular ligament of left ankle, initial encounter S93 492A        Start Time:   Stop Time:   Total time in clinic (min): 40 minutes    Assessment/Plan  Since the patient has started physical therapy, he has failed to make impairment based nor functional improvements secondary to recurrent ankle instability and pain  He has had multiple instances of giving way and recurrent ankle sprains since starting physical therapy, and eventually stopped coming to PT sessions after having a controlled fall out of his bathtub at home, when he rolled his ankle  He has been able to return to work; however, he still remains a high risk for recurrent injuries 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 as well as his prior history of multiple ankle sprains  The patient had a follow-up with the referring provider, who recommended for the patient to continue physical therapy, and if he does regain ROM and strength, he may be a candidate for a Brostrom revision procedure  At this time the patient has not called to schedule or make appointments, so he will be discharged at this time due to attendance policy, and will be able to have a new evaluation if he elects to have another course of physical therapy  Plan: Discharge  Will perform new evaluation of the patient if he elects to return to physical therapy for another course of treatment       Goals  Impairment Based Goals: (6 weeks)   Patient will restore ankle ROM to [facility] (not met)  Patient will improve LE strength to [facility] (not met)   Patient will improve FOTO greater than predicted outcome (not met)  Patient will restore normalized gait pattern (not met)     Functional Based Goals: (8 weeks)   Patient will be independent with home exercise program  (met)   Patient will be able to manage symptoms independently  (not met)   Patient will be able to return to work without limitations of left ankle pain (partially met)   Patient will be able to return to negotiating stairs without limitations of left ankle pain (partially met)       Subjective  Pain   Current: 7   Worst: 9   Best: 3   Status: No change     Objective     Functional Limitations: walking, weight bearing, working his job as a      Standing Posture: off weighted left lower extremity with weight shift towards the right lower extremity      Gait: antalgic gait with decreased stance phase of left lower extremity as well as decreased stride and step length    Ankle Figure 8 Girth Measurement:   R: 52 cm  L: 54 cm     Single Limb Stance: assess future visit   Currently highly irritable with poor weight bearing tolerance      Range of Motion:   Dorsiflexion: R  WNL  L -5 a/ -5 p (with pain)  Plantarflexion: R  WNL             L 30 a/ 25 p (with pain)  Inversion: R   WNL                     L 4 a/8 p (with pain)  Eversion: R    WNL                      L 4 a/ 5 p (with pain)    Palpation: tenderness to palpation of left ATFL and CFL     Anterior Drawer: positive

## 2020-04-26 ENCOUNTER — AMB VIDEO VISIT (OUTPATIENT)
Dept: URGENT CARE | Facility: CLINIC | Age: 30
End: 2020-04-26

## 2020-04-26 ENCOUNTER — AMB VIDEO VISIT (OUTPATIENT)
Dept: OTHER | Facility: HOSPITAL | Age: 30
End: 2020-04-26
Payer: COMMERCIAL

## 2020-04-26 DIAGNOSIS — S29.012A STRAIN OF THORACIC BACK REGION: ICD-10-CM

## 2020-04-26 DIAGNOSIS — M54.2 CERVICALGIA: Primary | ICD-10-CM

## 2020-04-26 PROCEDURE — 99201 PR OFFICE OUTPATIENT NEW 10 MINUTES: CPT | Performed by: PHYSICIAN ASSISTANT

## 2020-04-26 RX ORDER — MELOXICAM 7.5 MG/1
7.5 TABLET ORAL DAILY
Qty: 20 TABLET | Refills: 0 | Status: SHIPPED | OUTPATIENT
Start: 2020-04-26 | End: 2021-03-11

## 2020-04-26 RX ORDER — METHOCARBAMOL 500 MG/1
500 TABLET, FILM COATED ORAL 4 TIMES DAILY
Qty: 20 TABLET | Refills: 0 | Status: SHIPPED | OUTPATIENT
Start: 2020-04-26 | End: 2021-03-11

## 2020-04-27 ENCOUNTER — TELEPHONE (OUTPATIENT)
Dept: URGENT CARE | Facility: CLINIC | Age: 30
End: 2020-04-27

## 2020-12-06 ENCOUNTER — AMB VIDEO VISIT (OUTPATIENT)
Dept: OTHER | Facility: HOSPITAL | Age: 30
End: 2020-12-06
Payer: COMMERCIAL

## 2020-12-06 PROCEDURE — 99212 OFFICE O/P EST SF 10 MIN: CPT | Performed by: SPECIALIST

## 2021-03-04 ENCOUNTER — HOSPITAL ENCOUNTER (OUTPATIENT)
Dept: RADIOLOGY | Facility: HOSPITAL | Age: 31
Discharge: HOME | End: 2021-03-04
Payer: OTHER MISCELLANEOUS

## 2021-03-04 ENCOUNTER — OFFICE VISIT (OUTPATIENT)
Dept: OBGYN CLINIC | Facility: HOSPITAL | Age: 31
End: 2021-03-04
Payer: COMMERCIAL

## 2021-03-04 VITALS
WEIGHT: 194.4 LBS | BODY MASS INDEX: 27.22 KG/M2 | HEIGHT: 71 IN | HEART RATE: 85 BPM | DIASTOLIC BLOOD PRESSURE: 75 MMHG | SYSTOLIC BLOOD PRESSURE: 130 MMHG

## 2021-03-04 DIAGNOSIS — M25.561 ACUTE PAIN OF RIGHT KNEE: ICD-10-CM

## 2021-03-04 DIAGNOSIS — M25.561 ACUTE PAIN OF RIGHT KNEE: Primary | ICD-10-CM

## 2021-03-04 DIAGNOSIS — M25.461 EFFUSION OF RIGHT KNEE: ICD-10-CM

## 2021-03-04 DIAGNOSIS — M23.91 LOCKING OF RIGHT KNEE: ICD-10-CM

## 2021-03-04 PROCEDURE — 73560 X-RAY EXAM OF KNEE 1 OR 2: CPT

## 2021-03-04 PROCEDURE — 99213 OFFICE O/P EST LOW 20 MIN: CPT | Performed by: PHYSICIAN ASSISTANT

## 2021-03-04 NOTE — LETTER
March 4, 2021     Patient: Jing Boyle   YOB: 1990   Date of Visit: 3/4/2021       To Whom it May Concern:    Jing Boyle is under my professional care  He was seen in my office on 3/4/2021  He may remain out of work until follow up next week  If you have any questions or concerns, please don't hesitate to call           Sincerely,          Coco Abarca PA-C        CC: Jing Boyle

## 2021-03-05 ENCOUNTER — HOSPITAL ENCOUNTER (OUTPATIENT)
Dept: RADIOLOGY | Facility: IMAGING CENTER | Age: 31
Discharge: HOME | End: 2021-03-05
Payer: OTHER MISCELLANEOUS

## 2021-03-05 DIAGNOSIS — M25.561 ACUTE PAIN OF RIGHT KNEE: ICD-10-CM

## 2021-03-05 PROCEDURE — G1004 CDSM NDSC: HCPCS

## 2021-03-05 PROCEDURE — 73721 MRI JNT OF LWR EXTRE W/O DYE: CPT

## 2021-03-08 ENCOUNTER — OFFICE VISIT (OUTPATIENT)
Dept: FAMILY MEDICINE CLINIC | Facility: CLINIC | Age: 31
End: 2021-03-08
Payer: COMMERCIAL

## 2021-03-08 ENCOUNTER — OFFICE VISIT (OUTPATIENT)
Dept: OBGYN CLINIC | Facility: MEDICAL CENTER | Age: 31
End: 2021-03-08
Payer: OTHER MISCELLANEOUS

## 2021-03-08 VITALS
SYSTOLIC BLOOD PRESSURE: 137 MMHG | HEIGHT: 71 IN | DIASTOLIC BLOOD PRESSURE: 80 MMHG | HEART RATE: 99 BPM | BODY MASS INDEX: 27.16 KG/M2 | TEMPERATURE: 98.9 F | WEIGHT: 194 LBS

## 2021-03-08 VITALS
BODY MASS INDEX: 27.97 KG/M2 | HEIGHT: 71 IN | OXYGEN SATURATION: 98 % | RESPIRATION RATE: 16 BRPM | TEMPERATURE: 97.5 F | HEART RATE: 83 BPM | WEIGHT: 199.8 LBS | SYSTOLIC BLOOD PRESSURE: 110 MMHG | DIASTOLIC BLOOD PRESSURE: 50 MMHG

## 2021-03-08 DIAGNOSIS — Z13.1 SCREENING FOR DIABETES MELLITUS: ICD-10-CM

## 2021-03-08 DIAGNOSIS — S83.261A PERIPHERAL TEAR OF LATERAL MENISCUS OF RIGHT KNEE AS CURRENT INJURY, INITIAL ENCOUNTER: Primary | ICD-10-CM

## 2021-03-08 DIAGNOSIS — S83.261D PERIPHERAL TEAR OF LATERAL MENISCUS OF RIGHT KNEE AS CURRENT INJURY, SUBSEQUENT ENCOUNTER: ICD-10-CM

## 2021-03-08 DIAGNOSIS — R53.83 FATIGUE, UNSPECIFIED TYPE: ICD-10-CM

## 2021-03-08 DIAGNOSIS — Z13.6 SCREENING FOR CARDIOVASCULAR CONDITION: ICD-10-CM

## 2021-03-08 DIAGNOSIS — Z00.00 ANNUAL PHYSICAL EXAM: Primary | ICD-10-CM

## 2021-03-08 DIAGNOSIS — Z01.818 PRE-OP TESTING: ICD-10-CM

## 2021-03-08 PROCEDURE — 1036F TOBACCO NON-USER: CPT | Performed by: ORTHOPAEDIC SURGERY

## 2021-03-08 PROCEDURE — 99214 OFFICE O/P EST MOD 30 MIN: CPT | Performed by: ORTHOPAEDIC SURGERY

## 2021-03-08 PROCEDURE — 3725F SCREEN DEPRESSION PERFORMED: CPT | Performed by: FAMILY MEDICINE

## 2021-03-08 PROCEDURE — 99395 PREV VISIT EST AGE 18-39: CPT | Performed by: FAMILY MEDICINE

## 2021-03-08 RX ORDER — TRAMADOL HYDROCHLORIDE 50 MG/1
50 TABLET ORAL EVERY 6 HOURS SCHEDULED
Status: CANCELLED | OUTPATIENT
Start: 2021-03-08

## 2021-03-08 RX ORDER — CEFAZOLIN SODIUM 2 G/50ML
2000 SOLUTION INTRAVENOUS ONCE
Status: CANCELLED | OUTPATIENT
Start: 2021-03-16 | End: 2021-03-08

## 2021-03-08 RX ORDER — ACETAMINOPHEN 325 MG/1
650 TABLET ORAL EVERY 6 HOURS PRN
Status: CANCELLED | OUTPATIENT
Start: 2021-03-08

## 2021-03-08 NOTE — H&P (VIEW-ONLY)
Ortho Sports Medicine Knee Follow Up Visit     Assesment:     35 y o  male right knee lateral meniscus tear possible root tear    Plan:    Conservative treatment:    Ice to knee for 20 minutes at least 1-2 times daily  Post-op PT written  TROM brace given   Patient has crutches    Work note written    Imaging: All imaging from today was reviewed by myself and explained to the patient  Injection:    No Injection planned at this time  Surgery: All of the risks and benefits of operative treatment were explained to the patient, as well as the risks and benefits of any alternative treatment options, including nonoperative care  The risks of surgical treatement include, but are not limited to, infection, bleeding, blood clot, neurovascular damage, need for further surgery, continued pain, cardiovascular risk, and anesthesia risk  The patient understood this and elects to proceed forward with surgical intervention  We will proceed forward with surgical arthroscopy of the knee with lateral meniscus debridement vs repair vs root repair and possible medial menisectomy vs repair and possible chondroplasty  Patient denies prior history of blood clot or having a bleeding or clotting disorder  Patient was instructed to stop all NSAIDs 5 days prior to surgery  Follow up:    Return for 1 week post-op  Chief Complaint   Patient presents with    Right Knee - Follow-up       History of Present Illness: The patient presents to the office from referral from Rosamaria Hart PA-C seen in the orthopedic urgent care  Patient states that about 2 weeks ago he went to step up onto a stair and the knee became locked  Patient states that it took him several minutes before he could physically unlock her knee  Patient states that since this time he has had lateral aspect knee pain and continues to have clicking and popping sensation about the knee    He also continues to experience locking within the knee as well   Patient states that the knee does feel unstable pain is exacerbated with twisting and pivoting type motions  He does have a history of prior right knee surgical arthroscopy with meniscal repair by Dr Zully Kaiser 2011  Patient is unsure if this was the medial or lateral meniscus  Pain is located lateral      Pain is improved by rest   Pain is aggravated by stairs, squatting, weight bearing, walking and pivoting on a planted foot  Symptoms include clicking, catching, popping and swelling  The patient has tried rest, ice, NSAIDS and bracing  Knee Surgical History:  Right knee surgical arthroscopy with likely lateral meniscus repair, Dr Zully Kaiser, 2011    Past Medical, Social and Family History:  History reviewed  No pertinent past medical history    Past Surgical History:   Procedure Laterality Date    ANKLE SURGERY Bilateral     KNEE ARTHROSCOPY Right     KNEE SURGERY Right     [place] REVISE MEDIAN N/CARPAL TUNNEL SURG Right 9/20/2018    Procedure: RELEASE CARPAL TUNNEL;  Surgeon: Dara Flood MD;  Location: [facility];  Service: Orthopedics    SYNOVECTOMY Right 9/20/2018    Procedure: TENOSYNOVECTOMY Flexor digitorum superficialis to index, long, ring and small finger; Flexor digitorum profundus to index, long, ring and small finger; Flexor pollicis longus;  Surgeon: Dara Flood MD;  Location: [facility];  Service: Orthopedics    TONSILLECTOMY AND ADENOIDECTOMY       Allergies   Allergen Reactions    Ibuprofen Other (See Comments)     Mouth sores       Current Outpatient Medications on File Prior to Visit   Medication Sig Dispense Refill    acetaminophen (TYLENOL) 500 mg tablet Take 500 mg by mouth every 6 (six) hours as needed for mild pain      DAILY MULTIPLE VITAMINS PO Take by mouth      ibuprofen (MOTRIN) 200 mg tablet Take by mouth every 6 (six) hours as needed for mild pain      meloxicam (MOBIC) 7 5 mg tablet Take 1 tablet (7 5 mg total) by mouth daily (Patient not taking: Reported on 3/8/2021) 20 tablet 0    methocarbamol (ROBAXIN) 500 mg tablet Take 1 tablet (500 mg total) by mouth 4 (four) times a day (Patient not taking: Reported on 3/8/2021) 20 tablet 0     No current facility-administered medications on file prior to visit  Social History     Socioeconomic History    Marital status: /Civil Union     Spouse name: Not on file    Number of children: Not on file    Years of education: Not on file    Highest education level: Not on file   Occupational History    Not on file   Social Needs    Financial resource strain: Not on file    Food insecurity     Worry: Not on file     Inability: Not on file   [employer] needs     Medical: Not on file     Non-medical: Not on file   Tobacco Use    Smoking status: Never Smoker    Smokeless tobacco: Never Used   Substance and Sexual Activity    Alcohol use: Yes     Alcohol/week: 3 0 standard drinks     Types: 3 Cans of beer per week     Comment: social    Drug use: No    Sexual activity: Not on file   Lifestyle    Physical activity     Days per week: Not on file     Minutes per session: Not on file    Stress: Not on file   Relationships    Social connections     Talks on phone: Not on file     Gets together: Not on file     Attends Yazdanism service: Not on file     Active member of club or organization: Not on file     Attends meetings of clubs or organizations: Not on file     Relationship status: Not on file    Intimate partner violence     Fear of current or ex partner: Not on file     Emotionally abused: Not on file     Physically abused: Not on file     Forced sexual activity: Not on file   Other Topics Concern    Not on file   Social History Narrative    Not on file         I have reviewed the past medical, surgical, social and family history, medications and allergies as documented in the EMR  Review of systems: ROS is negative other than that noted in the HPI    Constitutional: Negative for fatigue and fever  Physical Exam:    Blood pressure 137/80, pulse 99, temperature 98 9 °F (37 2 °C), height 5' 11" (1 803 m), weight 88 kg (194 lb)  General/Constitutional: NAD, well developed, well nourished  HENT: Normocephalic, atraumatic  CV: Intact distal pulses, regular rate  Resp: No respiratory distress or labored breathing  Lymphatic: No lymphadenopathy palpated  Neuro: Alert and Oriented x 3, no focal deficits  Psych: Normal mood, normal affect, normal judgement, normal behavior  Skin: Warm, dry, no rashes, no erythema      Knee Exam (focused): RIGHT LEFT   ROM:   0-130, pain hyperextension  0-130   Palpation: Effusion small negative     MJL tenderness Positive Negative     LJL tenderness Positive Negative   Meniscus: Sneha Positive Negative    Apley's Compression Positive Negative   Instability: Varus stable stable     Valgus Stable pain laterally stable   Special Tests: Lachman Negative Negative     Posterior drawer Negative Negative     Anterior drawer Negative Negative     Pivot shift not tested not tested     Dial not tested not tested   Patella: Palpation no tenderness no tenderness     Mobility 1/4 1/4     Apprehension Negative Negative   Other: Single leg 1/4 squat not tested not tested           LE NV Exam: +2 DP/PT pulses bilaterally  Sensation intact to light touch L2-S1 bilaterally    No calf tenderness to palpation bilaterally      Knee Imaging    MRI of the right knee was reviewed and demonstrates lateral meniscus tear of the posterior horn possibly involving posterior root  There is also a partial cartilage defect of the medial patellar facet and central trochlea  I have reviewed the radiologist report and agree with their impression

## 2021-03-08 NOTE — PROGRESS NOTES
[facility]    NAME: Gian Ortega  AGE: 35 y o  SEX: male  : 1990     DATE: 3/8/2021     Assessment and Plan:     Problem List Items Addressed This Visit     None      Visit Diagnoses     Annual physical exam    -  Primary    Valeriy overall appears well  He is to continue a healthy, lower carb diet, and regular exercise (when cleared by Ortho after surgery)  FBW ordered  Screening for diabetes mellitus        Relevant Orders    Comprehensive metabolic panel    Screening for cardiovascular condition        Relevant Orders    Lipid Panel with Direct LDL reflex    Fatigue, unspecified type        Relevant Orders    CBC and differential    Peripheral tear of lateral meniscus of right knee as current injury, subsequent encounter        Pt is stable on exam - he is set for surgery with Orthopedics, coming up soon  Immunizations and preventive care screenings were discussed with patient today  Appropriate education was printed on patient's after visit summary  Counseling:  Dental Health: discussed importance of regular tooth brushing, flossing, and dental visits  · Exercise: the importance of regular exercise/physical activity was discussed  Recommend exercise 3-5 times per week for at least 30 minutes  BMI Counseling: Body mass index is 27 87 kg/m²  The BMI is above normal  Nutrition recommendations include moderation in carbohydrate intake  Exercise recommendations include exercising 3-5 times per week  No pharmacotherapy was ordered  Patient referred to PCP due to patient being overweight             Return in 1 year (on 3/8/2022) for Annual physical      Chief Complaint:     Chief Complaint   Patient presents with    Annual Exam     PT is being seen for his annual physical      History of Present Illness:     Adult Annual Physical   Patient here for a comprehensive physical exam  The patient reports no problems  Santhosh Larson injured his right knee shoveling snow approx 1 month ago; was on a ladder last week, and the knee locked on him  He will have surgery with Dr Baron Duarte of Ortho later this month  His son, Rebecca William, will turn 1 on 3/19/21  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  Had the Tdap in 2019  Diet and Physical Activity  · Diet/Nutrition: well balanced diet  · Exercise: walking  Also has a physical job  Depression Screening  PHQ-9 Depression Screening    PHQ-9:   Frequency of the following problems over the past two weeks:      Little interest or pleasure in doing things: 0 - not at all  Feeling down, depressed, or hopeless: 0 - not at all  PHQ-2 Score: 0       General Health  · Sleep: sleeps well  · Hearing: normal - bilateral   · Vision: no vision problems  · Dental: no dental visits for >1 year  We discussed   Health  · History of STDs?: no      Review of Systems:     Review of Systems   Constitutional: Negative for activity change  Respiratory: Negative for shortness of breath  Cardiovascular: Negative for chest pain  Gastrointestinal: Negative for abdominal pain  Genitourinary: Negative for dysuria  Musculoskeletal: Positive for arthralgias  Psychiatric/Behavioral: Negative for dysphoric mood  The patient is not nervous/anxious  He has situational stresses at times  Past Medical History:     History reviewed  No pertinent past medical history     Past Surgical History:     Past Surgical History:   Procedure Laterality Date    ANKLE SURGERY Bilateral     KNEE ARTHROSCOPY Right     KNEE SURGERY Right     [place] REVISE MEDIAN N/CARPAL TUNNEL SURG Right 9/20/2018    Procedure: RELEASE CARPAL TUNNEL;  Surgeon: Geraldine Knight MD;  Location: [facility] OR;  Service: Orthopedics    SYNOVECTOMY Right 9/20/2018    Procedure: TENOSYNOVECTOMY Flexor digitorum superficialis to index, long, ring and small finger; Flexor digitorum profundus to index, long, ring and small finger; Flexor pollicis longus;  Surgeon: Gary Queen MD;  Location: [facility] OR;  Service: Orthopedics    TONSILLECTOMY AND ADENOIDECTOMY        Social History:     E-Cigarette/Vaping    E-Cigarette Use Never User      E-Cigarette/Vaping Substances    Nicotine No     THC No     CBD No     Flavoring No     Other No     Unknown No      Social History     Socioeconomic History    Marital status: /Civil Union     Spouse name: None    Number of children: None    Years of education: None    Highest education level: None   Occupational History    None   Social Needs    Financial resource strain: None    Food insecurity     Worry: None     Inability: None    Transportation needs     Medical: None     Non-medical: None   Tobacco Use    Smoking status: Never Smoker    Smokeless tobacco: Never Used   Substance and Sexual Activity    Alcohol use:  Yes     Alcohol/week: 3 0 standard drinks     Types: 3 Cans of beer per week     Comment: social    Drug use: No    Sexual activity: None   Lifestyle    Physical activity     Days per week: None     Minutes per session: None    Stress: None   Relationships    Social connections     Talks on phone: None     Gets together: None     Attends Anabaptist service: None     Active member of club or organization: None     Attends meetings of clubs or organizations: None     Relationship status: None    Intimate partner violence     Fear of current or ex partner: None     Emotionally abused: None     Physically abused: None     Forced sexual activity: None   Other Topics Concern    None   Social History Narrative    None      Family History:     Family History   Problem Relation Age of Onset    No Known Problems Mother     No Known Problems Father       Current Medications:     Current Outpatient Medications   Medication Sig Dispense Refill    acetaminophen (TYLENOL) 500 mg tablet Take 500 mg by mouth every 6 (six) hours as needed for mild pain      DAILY MULTIPLE VITAMINS PO Take by mouth      ibuprofen (MOTRIN) 200 mg tablet Take by mouth every 6 (six) hours as needed for mild pain      meloxicam (MOBIC) 7 5 mg tablet Take 1 tablet (7 5 mg total) by mouth daily (Patient not taking: Reported on 3/8/2021) 20 tablet 0    methocarbamol (ROBAXIN) 500 mg tablet Take 1 tablet (500 mg total) by mouth 4 (four) times a day (Patient not taking: Reported on 3/8/2021) 20 tablet 0     No current facility-administered medications for this visit  Allergies: Allergies   Allergen Reactions    Ibuprofen Other (See Comments)     Mouth sores        Physical Exam:     /50   Pulse 83   Temp 97 5 °F (36 4 °C) (Skin)   Resp 16   Ht 5' 11" (1 803 m)   Wt 90 6 kg (199 lb 12 8 oz)   SpO2 98%   BMI 27 87 kg/m²     Physical Exam  Vitals signs and nursing note reviewed  Constitutional:       General: He is not in acute distress  Appearance: Normal appearance  He is not ill-appearing, toxic-appearing or diaphoretic  Comments: Pt with a knee brace/immobilizer on the right side  HENT:      Head: Normocephalic and atraumatic  Eyes:      General: No scleral icterus  Conjunctiva/sclera: Conjunctivae normal    Neck:      Musculoskeletal: Normal range of motion and neck supple  No neck rigidity or muscular tenderness  Cardiovascular:      Rate and Rhythm: Normal rate and regular rhythm  Heart sounds: Normal heart sounds  No murmur  No friction rub  No gallop  Pulmonary:      Effort: Pulmonary effort is normal  No respiratory distress  Breath sounds: Normal breath sounds  No stridor  No wheezing, rhonchi or rales  Abdominal:      General: There is no distension  Palpations: There is no mass  Tenderness: There is no abdominal tenderness  There is no guarding or rebound  Lymphadenopathy:      Cervical: No cervical adenopathy  Neurological:      Mental Status: He is alert and oriented to person, place, and time     Psychiatric: Mood and Affect: Mood normal          Behavior: Behavior normal          Thought Content: Thought content normal          Judgment: Judgment normal       Yoana Benítez was seen today for annual exam     Diagnoses and all orders for this visit:    Annual physical exam  Comments:  Yoana Benítez overall appears well  He is to continue a healthy, lower carb diet, and regular exercise (when cleared by Ortho after surgery)  FBW ordered  Screening for diabetes mellitus  -     Comprehensive metabolic panel; Future    Screening for cardiovascular condition  -     Lipid Panel with Direct LDL reflex; Future    Fatigue, unspecified type  -     CBC and differential; Future    Peripheral tear of lateral meniscus of right knee as current injury, subsequent encounter  Comments:  Pt is stable on exam - he is set for surgery with Orthopedics, coming up soon            [address]

## 2021-03-08 NOTE — PATIENT INSTRUCTIONS

## 2021-03-08 NOTE — PROGRESS NOTES
Ortho Sports Medicine Knee Follow Up Visit     Assesment:     35 y o  male right knee lateral meniscus tear possible root tear    Plan:    Conservative treatment:    Ice to knee for 20 minutes at least 1-2 times daily  Post-op PT written  TROM brace given   Patient has crutches    Work note written    Imaging: All imaging from today was reviewed by myself and explained to the patient  Injection:    No Injection planned at this time  Surgery: All of the risks and benefits of operative treatment were explained to the patient, as well as the risks and benefits of any alternative treatment options, including nonoperative care  The risks of surgical treatement include, but are not limited to, infection, bleeding, blood clot, neurovascular damage, need for further surgery, continued pain, cardiovascular risk, and anesthesia risk  The patient understood this and elects to proceed forward with surgical intervention  We will proceed forward with surgical arthroscopy of the knee with lateral meniscus debridement vs repair vs root repair and possible medial menisectomy vs repair and possible chondroplasty  Patient denies prior history of blood clot or having a bleeding or clotting disorder  Patient was instructed to stop all NSAIDs 5 days prior to surgery  Follow up:    Return for 1 week post-op  Chief Complaint   Patient presents with    Right Knee - Follow-up       History of Present Illness: The patient presents to the office from referral from Camille Means PA-C seen in the orthopedic urgent care  Patient states that about 2 weeks ago he went to step up onto a stair and the knee became locked  Patient states that it took him several minutes before he could physically unlock her knee  Patient states that since this time he has had lateral aspect knee pain and continues to have clicking and popping sensation about the knee    He also continues to experience locking within the knee as well   Patient states that the knee does feel unstable pain is exacerbated with twisting and pivoting type motions  He does have a history of prior right knee surgical arthroscopy with meniscal repair by Dr Love Guerrero 2011  Patient is unsure if this was the medial or lateral meniscus  Pain is located lateral      Pain is improved by rest   Pain is aggravated by stairs, squatting, weight bearing, walking and pivoting on a planted foot  Symptoms include clicking, catching, popping and swelling  The patient has tried rest, ice, NSAIDS and bracing  Knee Surgical History:  Right knee surgical arthroscopy with likely lateral meniscus repair, Dr Love Guerrero, 2011    Past Medical, Social and Family History:  History reviewed  No pertinent past medical history    Past Surgical History:   Procedure Laterality Date    ANKLE SURGERY Bilateral     KNEE ARTHROSCOPY Right     KNEE SURGERY Right     TX REVISE MEDIAN N/CARPAL TUNNEL SURG Right 9/20/2018    Procedure: RELEASE CARPAL TUNNEL;  Surgeon: Tonja Canela MD;  Location: [facility];  Service: Orthopedics    SYNOVECTOMY Right 9/20/2018    Procedure: TENOSYNOVECTOMY Flexor digitorum superficialis to index, long, ring and small finger; Flexor digitorum profundus to index, long, ring and small finger; Flexor pollicis longus;  Surgeon: Tonja Canela MD;  Location: [facility];  Service: Orthopedics    TONSILLECTOMY AND ADENOIDECTOMY       Allergies   Allergen Reactions    Ibuprofen Other (See Comments)     Mouth sores       Current Outpatient Medications on File Prior to Visit   Medication Sig Dispense Refill    acetaminophen (TYLENOL) 500 mg tablet Take 500 mg by mouth every 6 (six) hours as needed for mild pain      DAILY MULTIPLE VITAMINS PO Take by mouth      ibuprofen (MOTRIN) 200 mg tablet Take by mouth every 6 (six) hours as needed for mild pain      meloxicam (MOBIC) 7 5 mg tablet Take 1 tablet (7 5 mg total) by mouth daily (Patient not taking: Reported on 3/8/2021) 20 tablet 0    methocarbamol (ROBAXIN) 500 mg tablet Take 1 tablet (500 mg total) by mouth 4 (four) times a day (Patient not taking: Reported on 3/8/2021) 20 tablet 0     No current facility-administered medications on file prior to visit  Social History     Socioeconomic History    Marital status: /Civil Union     Spouse name: Not on file    Number of children: Not on file    Years of education: Not on file    Highest education level: Not on file   Occupational History    Not on file   Social Needs    Financial resource strain: Not on file    Food insecurity     Worry: Not on file     Inability: Not on file   [employer] needs     Medical: Not on file     Non-medical: Not on file   Tobacco Use    Smoking status: Never Smoker    Smokeless tobacco: Never Used   Substance and Sexual Activity    Alcohol use: Yes     Alcohol/week: 3 0 standard drinks     Types: 3 Cans of beer per week     Comment: social    Drug use: No    Sexual activity: Not on file   Lifestyle    Physical activity     Days per week: Not on file     Minutes per session: Not on file    Stress: Not on file   Relationships    Social connections     Talks on phone: Not on file     Gets together: Not on file     Attends Gnosticism service: Not on file     Active member of club or organization: Not on file     Attends meetings of clubs or organizations: Not on file     Relationship status: Not on file    Intimate partner violence     Fear of current or ex partner: Not on file     Emotionally abused: Not on file     Physically abused: Not on file     Forced sexual activity: Not on file   Other Topics Concern    Not on file   Social History Narrative    Not on file         I have reviewed the past medical, surgical, social and family history, medications and allergies as documented in the EMR  Review of systems: ROS is negative other than that noted in the HPI    Constitutional: Negative for fatigue and fever  Physical Exam:    Blood pressure 137/80, pulse 99, temperature 98 9 °F (37 2 °C), height 5' 11" (1 803 m), weight 88 kg (194 lb)  General/Constitutional: NAD, well developed, well nourished  HENT: Normocephalic, atraumatic  CV: Intact distal pulses, regular rate  Resp: No respiratory distress or labored breathing  Lymphatic: No lymphadenopathy palpated  Neuro: Alert and Oriented x 3, no focal deficits  Psych: Normal mood, normal affect, normal judgement, normal behavior  Skin: Warm, dry, no rashes, no erythema      Knee Exam (focused): RIGHT LEFT   ROM:   0-130, pain hyperextension  0-130   Palpation: Effusion small negative     MJL tenderness Positive Negative     LJL tenderness Positive Negative   Meniscus: Sneha Positive Negative    Apley's Compression Positive Negative   Instability: Varus stable stable     Valgus Stable pain laterally stable   Special Tests: Lachman Negative Negative     Posterior drawer Negative Negative     Anterior drawer Negative Negative     Pivot shift not tested not tested     Dial not tested not tested   Patella: Palpation no tenderness no tenderness     Mobility 1/4 1/4     Apprehension Negative Negative   Other: Single leg 1/4 squat not tested not tested           LE NV Exam: +2 DP/PT pulses bilaterally  Sensation intact to light touch L2-S1 bilaterally    No calf tenderness to palpation bilaterally      Knee Imaging    MRI of the right knee was reviewed and demonstrates lateral meniscus tear of the posterior horn possibly involving posterior root  There is also a partial cartilage defect of the medial patellar facet and central trochlea  I have reviewed the radiologist report and agree with their impression

## 2021-03-08 NOTE — LETTER
March 8, 2021     Patient: Maria Luz Whitaker   YOB: 1990   Date of Visit: 3/8/2021       To Whom it May Concern:    Maria Luz Whitaker is under my professional care  He was seen in my office on 3/8/2021  He is unable to return to work at this time due to right knee injury that requires surgical intervention  Patient will be seen back in the office 1 week postoperatively  Patient should remain out of work from the date of today until that postop appointment  At the postop appointment his work status will be re-evaluated  If you have any questions or concerns, please don't hesitate to call           Sincerely,          Jv Mendes DO        CC: Maria Luz Whitaker

## 2021-03-10 DIAGNOSIS — S83.261A PERIPHERAL TEAR OF LATERAL MENISCUS OF RIGHT KNEE AS CURRENT INJURY, INITIAL ENCOUNTER: ICD-10-CM

## 2021-03-10 DIAGNOSIS — Z01.818 PRE-OP TESTING: ICD-10-CM

## 2021-03-10 PROCEDURE — U0005 INFEC AGEN DETEC AMPLI PROBE: HCPCS | Performed by: PHYSICIAN ASSISTANT

## 2021-03-10 PROCEDURE — U0003 INFECTIOUS AGENT DETECTION BY NUCLEIC ACID (DNA OR RNA); SEVERE ACUTE RESPIRATORY SYNDROME CORONAVIRUS 2 (SARS-COV-2) (CORONAVIRUS DISEASE [COVID-19]), AMPLIFIED PROBE TECHNIQUE, MAKING USE OF HIGH THROUGHPUT TECHNOLOGIES AS DESCRIBED BY CMS-2020-01-R: HCPCS | Performed by: PHYSICIAN ASSISTANT

## 2021-03-11 LAB — SARS-COV-2 RNA RESP QL NAA+PROBE: NEGATIVE

## 2021-03-11 NOTE — PRE-PROCEDURE INSTRUCTIONS
Pre-Surgery Instructions:   Medication Instructions    acetaminophen (TYLENOL) 500 mg tablet May take morning of surgery if needed with sip of water    DAILY MULTIPLE VITAMINS PO HOLD 7 days prior to surgery     Reviewed all medications and instructions with pt for DOS  Reviewed all showering instructions and COVID visitation policy for DOS  Pt is aware that surgery is at [facility], aware that pre op nurse will call with any other instructions for DOS on 3/16/21  Pt instructed to bring photo ID and insurance card for DOS, instructed to remove all jewelry/valuables for DOS  Pt is aware that transport is needed both to and from hospital for DOS, due to receiving anesthesia  Pt verbalized understanding of all instructions and information discussed  My Surgical Experience    The following information was developed to assist you to prepare for your operation  What do I need to do before coming to the hospital?   Arrange for a responsible person to drive you to and from the hospital    Arrange care for your children at home  Children are not allowed in the recovery areas of the hospital   Plan to wear clothing that is easy to put on and take off  If you are having shoulder surgery, wear a shirt that buttons or zippers in the front  Bathing  o Shower the evening before and the morning of your surgery with an antibacterial soap  Please refer to the Pre Op Showering Instructions for Surgery Patients Sheet   o Remove nail polish and all body piercing jewelry  o Do not shave any body part for at least 24 hours before surgery-this includes face, arms, legs and upper body  Food  o Nothing to eat or drink after midnight the night before your surgery   This includes candy and chewing gum  o Exception: If your surgery is after 12:00pm (noon), you may have clear liquids such as 7-Up®, ginger ale, apple or cranberry juice, Jell-O®, water, or clear broth until 8:00 am  o Do not drink milk or juice with pulp on the morning before surgery  o Do not drink alcohol 24 hours before surgery  Medicine  o Follow instructions you received from your surgeon about which medicines you may take on the day of surgery  o If instructed to take medicine on the morning of surgery, take pills with just a small sip of water  Call your prescribing doctor for specific infroamtion on what to do if you take insulin    What should I bring to the hospital?    Bring:  [place] or a walker, if you have them, for foot or knee surgery   A list of the daily medicines, vitamins, minerals, herbals and nutritional supplements you take  Include the dosages of medicines and the time you take them each day   Glasses, dentures or hearing aids   Minimal clothing; you will be wearing hospital sleepwear   Photo ID; required to verify your identity   If you have a Living Will or Power of , bring a copy of the documents   If you have an ostomy, bring an extra pouch and any supplies you use    Do not bring   Medicines or inhalers   Money, valuables or jewelry    What other information should I know about the day of surgery?  Notify your surgeons if you develop a cold, sore throat, cough, fever, rash or any other illness   Report to the Ambulatory Surgical/Same Day Surgery Unit   You will be instructed to stop at Registration only if you have not been pre-registered   Inform your  fi they do not stay that they will be asked by the staff to leave a phone number where they can be reached   Be available to be reached before surgery  In the event the operating room schedule changes, you may be asked to come in earlier or later than expected    *It is important to tell your doctor and others involved in your health care if you are taking or have been taking any non-prescription drugs, vitamins, minerals, herbals or other nutritional supplements   Any of these may interact with some food or medicines and cause a reaction

## 2021-03-12 ENCOUNTER — TELEPHONE (OUTPATIENT)
Dept: OBGYN CLINIC | Facility: MEDICAL CENTER | Age: 31
End: 2021-03-12

## 2021-03-12 NOTE — TELEPHONE ENCOUNTER
[facility] with [facility] information    [address] [facility] [address]  Phone: [phone],   Fax : [phone]  Claim # [id number]

## 2021-03-15 ENCOUNTER — ANESTHESIA EVENT (OUTPATIENT)
Dept: PERIOP | Facility: HOSPITAL | Age: 31
End: 2021-03-15
Payer: OTHER MISCELLANEOUS

## 2021-03-15 PROBLEM — R11.2 PONV (POSTOPERATIVE NAUSEA AND VOMITING): Status: ACTIVE | Noted: 2021-03-15

## 2021-03-15 PROBLEM — Z98.890 PONV (POSTOPERATIVE NAUSEA AND VOMITING): Status: ACTIVE | Noted: 2021-03-15

## 2021-03-15 NOTE — ANESTHESIA PREPROCEDURE EVALUATION
Procedure:  REPAIR MENISCUS vs regular repair vs menisectomy (Right Knee)    Relevant Problems   ANESTHESIA  old chart reviewed   (+) PONV (postoperative nausea and vomiting)      CARDIO (within normal limits)      ENDO (within normal limits)      GI/HEPATIC (within normal limits)      /RENAL (within normal limits)      HEMATOLOGY (within normal limits)      MUSCULOSKELETAL  hx citrullinated peptide      NEURO/PSYCH (within normal limits)      PULMONARY (within normal limits)        Physical Exam    Airway    Mallampati score: II    Neck ROM: full     Dental       Cardiovascular  Cardiovascular exam normal    Pulmonary  Pulmonary exam normal     Other Findings  Fixed upper and lower teeth and in good repair      Anesthesia Plan  ASA Score- 2     Anesthesia Type- general with ASA Monitors  Additional Monitors:   Airway Plan: LMA  Plan Factors-Exercise tolerance (METS): >4 METS  Chart reviewed  EKG reviewed  Existing labs reviewed  Patient summary reviewed  Patient is not a current smoker  Patient not instructed to abstain from smoking on day of procedure  Patient did not smoke on day of surgery  Obstructive sleep apnea risk education given perioperatively  Induction- intravenous  Postoperative Plan- Plan for postoperative opioid use  Informed Consent- Anesthetic plan and risks discussed with patient and spouse  I personally reviewed this patient with the CRNA  Discussed and agreed on the Anesthesia Plan with the CRNA  Roel Lo

## 2021-03-16 ENCOUNTER — ANESTHESIA (OUTPATIENT)
Dept: PERIOP | Facility: HOSPITAL | Age: 31
End: 2021-03-16
Payer: OTHER MISCELLANEOUS

## 2021-03-16 ENCOUNTER — HOSPITAL ENCOUNTER (OUTPATIENT)
Facility: HOSPITAL | Age: 31
Setting detail: OUTPATIENT SURGERY
Discharge: HOME | End: 2021-03-16
Attending: ORTHOPAEDIC SURGERY | Admitting: ORTHOPAEDIC SURGERY
Payer: OTHER MISCELLANEOUS

## 2021-03-16 ENCOUNTER — TELEPHONE (OUTPATIENT)
Dept: OBGYN CLINIC | Facility: MEDICAL CENTER | Age: 31
End: 2021-03-16

## 2021-03-16 VITALS
HEART RATE: 78 BPM | WEIGHT: 194 LBS | SYSTOLIC BLOOD PRESSURE: 136 MMHG | BODY MASS INDEX: 27.16 KG/M2 | RESPIRATION RATE: 18 BRPM | TEMPERATURE: 98 F | DIASTOLIC BLOOD PRESSURE: 68 MMHG | HEIGHT: 71 IN | OXYGEN SATURATION: 100 %

## 2021-03-16 DIAGNOSIS — S83.241A ACUTE MEDIAL MENISCUS TEAR OF RIGHT KNEE, INITIAL ENCOUNTER: Primary | ICD-10-CM

## 2021-03-16 PROCEDURE — 29882 ARTHRS KNE SRG MNISC RPR M/L: CPT | Performed by: PHYSICIAN ASSISTANT

## 2021-03-16 PROCEDURE — C1713 ANCHOR/SCREW BN/BN,TIS/BN: HCPCS | Performed by: ORTHOPAEDIC SURGERY

## 2021-03-16 PROCEDURE — 29882 ARTHRS KNE SRG MNISC RPR M/L: CPT | Performed by: ORTHOPAEDIC SURGERY

## 2021-03-16 DEVICE — FAST-FIX 360 CURVED MENISCAL                                    REPAIR SYSTEM
Type: IMPLANTABLE DEVICE | Site: KNEE | Status: FUNCTIONAL
Brand: FAST-FIX

## 2021-03-16 RX ORDER — DEXAMETHASONE SODIUM PHOSPHATE 4 MG/ML
INJECTION, SOLUTION INTRA-ARTICULAR; INTRALESIONAL; INTRAMUSCULAR; INTRAVENOUS; SOFT TISSUE AS NEEDED
Status: DISCONTINUED | OUTPATIENT
Start: 2021-03-16 | End: 2021-03-16

## 2021-03-16 RX ORDER — ONDANSETRON 4 MG/1
4 TABLET, FILM COATED ORAL EVERY 8 HOURS PRN
Qty: 20 TABLET | Refills: 0 | Status: SHIPPED | OUTPATIENT
Start: 2021-03-16 | End: 2022-02-28

## 2021-03-16 RX ORDER — PROMETHAZINE HYDROCHLORIDE 25 MG/ML
12.5 INJECTION, SOLUTION INTRAMUSCULAR; INTRAVENOUS ONCE AS NEEDED
Status: DISCONTINUED | OUTPATIENT
Start: 2021-03-16 | End: 2021-03-16 | Stop reason: HOSPADM

## 2021-03-16 RX ORDER — ONDANSETRON 2 MG/ML
INJECTION INTRAMUSCULAR; INTRAVENOUS AS NEEDED
Status: DISCONTINUED | OUTPATIENT
Start: 2021-03-16 | End: 2021-03-16

## 2021-03-16 RX ORDER — LIDOCAINE HYDROCHLORIDE 10 MG/ML
INJECTION, SOLUTION EPIDURAL; INFILTRATION; INTRACAUDAL; PERINEURAL AS NEEDED
Status: DISCONTINUED | OUTPATIENT
Start: 2021-03-16 | End: 2021-03-16

## 2021-03-16 RX ORDER — KETOROLAC TROMETHAMINE 30 MG/ML
30 INJECTION, SOLUTION INTRAMUSCULAR; INTRAVENOUS ONCE
Status: COMPLETED | OUTPATIENT
Start: 2021-03-16 | End: 2021-03-16

## 2021-03-16 RX ORDER — ASPIRIN 325 MG
325 TABLET, DELAYED RELEASE (ENTERIC COATED) ORAL DAILY
Qty: 30 TABLET | Refills: 0 | Status: SHIPPED | OUTPATIENT
Start: 2021-03-16 | End: 2022-01-27 | Stop reason: ALTCHOICE

## 2021-03-16 RX ORDER — ACETAMINOPHEN 325 MG/1
650 TABLET ORAL EVERY 6 HOURS PRN
Status: DISCONTINUED | OUTPATIENT
Start: 2021-03-16 | End: 2021-03-16 | Stop reason: HOSPADM

## 2021-03-16 RX ORDER — FENTANYL CITRATE/PF 50 MCG/ML
25 SYRINGE (ML) INJECTION
Status: COMPLETED | OUTPATIENT
Start: 2021-03-16 | End: 2021-03-16

## 2021-03-16 RX ORDER — MEPERIDINE HYDROCHLORIDE 25 MG/ML
12.5 INJECTION INTRAMUSCULAR; INTRAVENOUS; SUBCUTANEOUS
Status: DISCONTINUED | OUTPATIENT
Start: 2021-03-16 | End: 2021-03-16 | Stop reason: HOSPADM

## 2021-03-16 RX ORDER — HYDROMORPHONE HCL/PF 1 MG/ML
0.5 SYRINGE (ML) INJECTION
Status: DISCONTINUED | OUTPATIENT
Start: 2021-03-16 | End: 2021-03-16 | Stop reason: HOSPADM

## 2021-03-16 RX ORDER — DIPHENHYDRAMINE HYDROCHLORIDE 50 MG/ML
12.5 INJECTION INTRAMUSCULAR; INTRAVENOUS ONCE AS NEEDED
Status: DISCONTINUED | OUTPATIENT
Start: 2021-03-16 | End: 2021-03-16 | Stop reason: HOSPADM

## 2021-03-16 RX ORDER — DEXAMETHASONE SODIUM PHOSPHATE 4 MG/ML
4 INJECTION, SOLUTION INTRA-ARTICULAR; INTRALESIONAL; INTRAMUSCULAR; INTRAVENOUS; SOFT TISSUE ONCE AS NEEDED
Status: COMPLETED | OUTPATIENT
Start: 2021-03-16 | End: 2021-03-16

## 2021-03-16 RX ORDER — FENTANYL CITRATE/PF 50 MCG/ML
12.5 SYRINGE (ML) INJECTION
Status: DISCONTINUED | OUTPATIENT
Start: 2021-03-16 | End: 2021-03-16 | Stop reason: HOSPADM

## 2021-03-16 RX ORDER — OXYCODONE HYDROCHLORIDE 5 MG/1
5 TABLET ORAL EVERY 4 HOURS PRN
Qty: 15 TABLET | Refills: 0 | Status: SHIPPED | OUTPATIENT
Start: 2021-03-16 | End: 2021-03-18 | Stop reason: SDUPTHER

## 2021-03-16 RX ORDER — CEFAZOLIN SODIUM 2 G/50ML
2000 SOLUTION INTRAVENOUS ONCE
Status: COMPLETED | OUTPATIENT
Start: 2021-03-16 | End: 2021-03-16

## 2021-03-16 RX ORDER — GLYCOPYRROLATE 0.2 MG/ML
INJECTION INTRAMUSCULAR; INTRAVENOUS AS NEEDED
Status: DISCONTINUED | OUTPATIENT
Start: 2021-03-16 | End: 2021-03-16

## 2021-03-16 RX ORDER — SCOLOPAMINE TRANSDERMAL SYSTEM 1 MG/1
1 PATCH, EXTENDED RELEASE TRANSDERMAL ONCE
Status: DISCONTINUED | OUTPATIENT
Start: 2021-03-16 | End: 2021-03-16 | Stop reason: HOSPADM

## 2021-03-16 RX ORDER — SODIUM CHLORIDE, SODIUM LACTATE, POTASSIUM CHLORIDE, CALCIUM CHLORIDE 600; 310; 30; 20 MG/100ML; MG/100ML; MG/100ML; MG/100ML
75 INJECTION, SOLUTION INTRAVENOUS CONTINUOUS
Status: DISCONTINUED | OUTPATIENT
Start: 2021-03-16 | End: 2021-03-16 | Stop reason: HOSPADM

## 2021-03-16 RX ORDER — MAGNESIUM HYDROXIDE 1200 MG/15ML
LIQUID ORAL AS NEEDED
Status: DISCONTINUED | OUTPATIENT
Start: 2021-03-16 | End: 2021-03-16 | Stop reason: HOSPADM

## 2021-03-16 RX ORDER — MIDAZOLAM HYDROCHLORIDE 2 MG/2ML
INJECTION, SOLUTION INTRAMUSCULAR; INTRAVENOUS AS NEEDED
Status: DISCONTINUED | OUTPATIENT
Start: 2021-03-16 | End: 2021-03-16

## 2021-03-16 RX ORDER — PROPOFOL 10 MG/ML
INJECTION, EMULSION INTRAVENOUS CONTINUOUS PRN
Status: DISCONTINUED | OUTPATIENT
Start: 2021-03-16 | End: 2021-03-16

## 2021-03-16 RX ORDER — PROPOFOL 10 MG/ML
INJECTION, EMULSION INTRAVENOUS AS NEEDED
Status: DISCONTINUED | OUTPATIENT
Start: 2021-03-16 | End: 2021-03-16

## 2021-03-16 RX ORDER — TRAMADOL HYDROCHLORIDE 50 MG/1
50 TABLET ORAL EVERY 6 HOURS SCHEDULED
Status: DISCONTINUED | OUTPATIENT
Start: 2021-03-16 | End: 2021-03-16 | Stop reason: HOSPADM

## 2021-03-16 RX ORDER — FENTANYL CITRATE 50 UG/ML
INJECTION, SOLUTION INTRAMUSCULAR; INTRAVENOUS AS NEEDED
Status: DISCONTINUED | OUTPATIENT
Start: 2021-03-16 | End: 2021-03-16

## 2021-03-16 RX ADMIN — FENTANYL CITRATE 25 MCG: 50 INJECTION, SOLUTION INTRAMUSCULAR; INTRAVENOUS at 12:58

## 2021-03-16 RX ADMIN — PROPOFOL 200 MG: 10 INJECTION, EMULSION INTRAVENOUS at 11:30

## 2021-03-16 RX ADMIN — FENTANYL CITRATE 12.5 MCG: 50 INJECTION, SOLUTION INTRAMUSCULAR; INTRAVENOUS at 13:28

## 2021-03-16 RX ADMIN — FENTANYL CITRATE 25 MCG: 50 INJECTION, SOLUTION INTRAMUSCULAR; INTRAVENOUS at 12:48

## 2021-03-16 RX ADMIN — TRAMADOL HYDROCHLORIDE 50 MG: 50 TABLET, FILM COATED ORAL at 14:44

## 2021-03-16 RX ADMIN — ONDANSETRON HYDROCHLORIDE 4 MG: 2 INJECTION, SOLUTION INTRAMUSCULAR; INTRAVENOUS at 11:30

## 2021-03-16 RX ADMIN — FENTANYL CITRATE 12.5 MCG: 50 INJECTION, SOLUTION INTRAMUSCULAR; INTRAVENOUS at 13:38

## 2021-03-16 RX ADMIN — KETOROLAC TROMETHAMINE 30 MG: 30 INJECTION, SOLUTION INTRAMUSCULAR; INTRAVENOUS at 12:53

## 2021-03-16 RX ADMIN — GLYCOPYRROLATE 0.2 MG: 0.2 INJECTION, SOLUTION INTRAMUSCULAR; INTRAVENOUS at 11:44

## 2021-03-16 RX ADMIN — FENTANYL CITRATE 25 MCG: 50 INJECTION, SOLUTION INTRAMUSCULAR; INTRAVENOUS at 13:03

## 2021-03-16 RX ADMIN — MIDAZOLAM HYDROCHLORIDE 2 MG: 1 INJECTION, SOLUTION INTRAMUSCULAR; INTRAVENOUS at 11:26

## 2021-03-16 RX ADMIN — PROPOFOL 130 MCG/KG/MIN: 10 INJECTION, EMULSION INTRAVENOUS at 11:42

## 2021-03-16 RX ADMIN — SODIUM CHLORIDE, SODIUM LACTATE, POTASSIUM CHLORIDE, AND CALCIUM CHLORIDE: .6; .31; .03; .02 INJECTION, SOLUTION INTRAVENOUS at 11:20

## 2021-03-16 RX ADMIN — FENTANYL CITRATE 12.5 MCG: 50 INJECTION, SOLUTION INTRAMUSCULAR; INTRAVENOUS at 12:27

## 2021-03-16 RX ADMIN — FENTANYL CITRATE 12.5 MCG: 50 INJECTION, SOLUTION INTRAMUSCULAR; INTRAVENOUS at 13:18

## 2021-03-16 RX ADMIN — DEXAMETHASONE SODIUM PHOSPHATE 4 MG: 4 INJECTION, SOLUTION INTRAMUSCULAR; INTRAVENOUS at 12:50

## 2021-03-16 RX ADMIN — FENTANYL CITRATE 25 MCG: 50 INJECTION, SOLUTION INTRAMUSCULAR; INTRAVENOUS at 12:43

## 2021-03-16 RX ADMIN — HYDROMORPHONE HYDROCHLORIDE 0.5 MG: 1 INJECTION, SOLUTION INTRAMUSCULAR; INTRAVENOUS; SUBCUTANEOUS at 13:55

## 2021-03-16 RX ADMIN — CEFAZOLIN SODIUM 2000 MG: 2 SOLUTION INTRAVENOUS at 11:26

## 2021-03-16 RX ADMIN — FENTANYL CITRATE 50 MCG: 50 INJECTION, SOLUTION INTRAMUSCULAR; INTRAVENOUS at 11:40

## 2021-03-16 RX ADMIN — SCOPALAMINE 1 PATCH: 1 PATCH, EXTENDED RELEASE TRANSDERMAL at 09:58

## 2021-03-16 RX ADMIN — DEXAMETHASONE SODIUM PHOSPHATE 4 MG: 4 INJECTION, SOLUTION INTRAMUSCULAR; INTRAVENOUS at 12:21

## 2021-03-16 RX ADMIN — LIDOCAINE HYDROCHLORIDE 50 MG: 10 INJECTION, SOLUTION EPIDURAL; INFILTRATION; INTRACAUDAL; PERINEURAL at 11:47

## 2021-03-16 NOTE — ANESTHESIA POSTPROCEDURE EVALUATION
Post-Op Assessment Note    CV Status:  Stable  Pain Score: 0    Pain management: adequate     Mental Status:  Alert   Hydration Status:  Stable   PONV Controlled:  Controlled   Airway Patency:  Patent      Post Op Vitals Reviewed: Yes      Staff: CRNA         No complications documented      /75 (03/16/21 1232)    Temp 98 2 °F (36 8 °C) (03/16/21 1232)    Pulse 62 (03/16/21 1232)   Resp 12 (03/16/21 1232)    SpO2 100 % (03/16/21 1232)

## 2021-03-16 NOTE — DISCHARGE INSTRUCTIONS
POSTOPERATIVE INSTRUCTIONS following KNEE SURGERY    MEDICATIONS:  · Resume all home medications unless otherwise instructed by your surgeon  · Pain Medication:  Oxycodone 5 mg, 1-3 tablets every 3 hours as needed  · If you were given a regional anesthetic (nerve block), please begin taking the pain medication as soon as you get home, even if you have minimal or no pain  DO NOT WAIT FOR THE NERVE BLOCK TO WEAR OFF  · Possible side effects include nausea, constipation, and urinary retention  If you experience these side effects, please call our office for assistance  · Pain med refills are authorized only during office hours (8am-4pm, Mon-Fri)  · Anti-Inflammatory:  Ibuprofen 600 mg, 1 tablet every 8 hours for 4 weeks and Tylenol 325 mg, 1-2 tablets every 6 hours for 4 weeks  · Take with food  Stop if you experience nausea, reflux, or stomach pain  · Nausea Medication:  Zofran 4mg, 1 tablet every 8 hours as needed for nausea and vomiting   · Blood Clot Prevention:  Aspirin 325 mg, 1 tablet daily for 30 days  · Pump your foot up and down 20 times per hour while you are less mobile  WOUND CARE:  · Keep the dressing clean and dry  Light drainage may occur the first 2 days postop  · You may remove the dressings and get the incision wet in the shower 72 hours after surgery  DO NOT remove steri-strips or sutures  DO NOT immerse the incision under water  Carefully pat the incision dry  If there is wound drainage, re-apply a fresh dry gauze dressing  · Please call our office ([phone]) if you experience either of the following:  · Sudden increase in swelling, redness, or warmth at the surgical site  · Excessive incisional drainage that persists beyond the 3rd day after surgery  · Oral temperature greater than 101 degrees, not relieved with Tylenol  · Shortness of breath, chest pain, nausea, or any other concerning symptoms    SWELLING CONTROL:  · Cold Therapy:   The cold therapy device may be used either continuously or only as needed, according to your preference  Do not let the pad directly touch your skin  Alternatively, apply ice (20 min on, 20 min off) as often as you feel is necessary  · Elevation:  Elevate the entire leg above heart level  Place pillows under your ankle to keep your knee straight  · Compression:  Apply ACE wraps or a thigh-length compression stocking as needed  RANGE OF MOTION:  · You are allowed LIMITED RANGE OF MOTION from 0 degrees (full extension) to 90 degrees of flexion    IMMOBILIZATION:  · Your knee brace should be WORN AT ALL TIMES, including sleep  Keep the brace LOCKED FOR WALKING  You may unlock the brace while sitting or sleeping  You may remove the brace only for showering  ACTIVITY:   · BEAR FULL WEIGHT AS TOLERATED on the operative leg locked straight in extension only with knee brace on  Use crutches to assist only as needed  · Using Crutches on Stairs:  Going up, lead with your "good" (nonoperative) leg  Going down, lead with your "bad" (operative) leg  Use a hand rail when available  · Knee Extension:  Place a rolled towel or pillow under your ankle for 20-30 minutes 3-5 times per day  This will help to maintain full knee extension  · Quad Sets:  Sit or lie with your knee straight  Tighten your quadriceps (front thigh) muscle  Hold for 3 seconds, then relax  Repeat 20 times per hour while awake  PHYSICAL THERAPY:  · Begin therapy 5 TO 7 DAYS AFTER SURGERY  You were given a prescription for therapy at your preoperative office visit  If you do not have physical therapy scheduled yet, please call our office for assistance  FOLLOW-UP APPOINTMENT:  · 7-10 days after surgery with:    [facility]  [address] [place] [address], [address] [place] [address], [address] [facility]  [phone] ([facility] [address])  [phone] (After-Hours)

## 2021-03-16 NOTE — ANESTHESIA POSTPROCEDURE EVALUATION
Post-Op Assessment Note    CV Status:  Stable  Pain Score: 7    Pain management: adequate     Mental Status:  Alert and awake   Hydration Status:  Euvolemic   PONV Controlled:  Controlled   Airway Patency:  Patent      Post Op Vitals Reviewed: Yes      Staff: Anesthesiologist, CRNA   Comments: LMA without any knwon complications          No complications documented      /75 (03/16/21 1232)    Temp 98 2 °F (36 8 °C) (03/16/21 1232)    Pulse 62 (03/16/21 1232)   Resp 12 (03/16/21 1232)    SpO2 100 % (03/16/21 1232)

## 2021-03-16 NOTE — OP NOTE
OPERATIVE REPORT  PATIENT NAME: Neisha Marley    :  1990  MRN: [id number]  Pt Location:  OR ROOM 12    SURGERY DATE: 3/16/2021    Surgeon(s) and Role:     * Kyle Perdue DO - Primary     * Hedy Jones PA-C - Assisting    Preop Diagnosis:  Peripheral tear of lateral meniscus of right knee as current injury, initial encounter [S83 261A]    Post-Op Diagnosis Codes:     * Peripheral tear of lateral meniscus of right knee as current injury, initial encounter [S83 261A]    Procedure(s) (LRB):  REPAIR MENISCUS vs regular repair vs menisectomy (Right)    Specimen(s):  * No specimens in log *    Estimated Blood Loss:   Minimal    Drains:  * No LDAs found *    Anesthesia Type:   General    Operative Indications:  Peripheral tear of lateral meniscus of right knee as current injury, initial encounter [K48 180O]    Complications:   None    Procedure and Technique:    Operation:    1  Surgical arthroscopy of the right knee with lateral meniscus repair       Operative Modifiers:  None      Surgeon:  MAINOR Vance  First Assistant:  Latonya Matos PA-C     Second Assistant:  None     Anesthesia:  general      Tourniquet Time:  None      Blood Loss:  Minimal      Indications: Mr Daniele Patterson is a 35 y o  male with a lateral meniscus tear  An MRI was obtained a revealed a tear of the right lateral meniscus  Due to the patient's MRI findings, active lifestyle, and lack of improvement with a conservative approach, it was recommended that they proceed forward with arthroscopic surgical management of this problem  We reviewed risks and benefits of surgery and a decision was made to proceed with surgery to address the torn lateral meniscus of the right knee  Findings:      Arthroscopic evaluation of the right knee revealed the following:     Medial meniscus: No tears  Medial femoral condyle:Grade 0 chondral defects  Medial tibial plateau: Grade 0 chondral defects    Anterior cruciate ligament: Normal appearance  Posterior cruciate ligament: Normal appearance  Lateral meniscus: There was a peripheral tear of the lateral meniscus involving the posterior horn and root and a bucket handle component  Lateral femoral condyle: Grade 0 chondral defects  Lateral tibial plateau: Grade 0 chondral defects  Medial and lateral gutters: No loose bodies  Patella: Grade 0 chondral defects  Trochlea: Grade 0 chondral defects      Medial plica: No significant plica was present            Procedure: In the pre-operative holding area, the patient identified the correct operative extremity and I marked that extremity with my initials, using a permanent marker  The patient was then brought to the operating room and positioned supine  Following satisfactory induction of anesthesia, the right knee was prepped and draped in the usual sterile fashion for surgical arthroscopy of the right knee  Before any surgical instrumentation was passed to me by the surgical technician, a formalized time-out occurred, which involves the surgeon, circulating nurse, and anesthesia staff all verifying the correct operative extremity  My initials were visible on the prepped and draped operative field  The anatomic landmarks of the anteromedial and anterolateral portals were marked and these portal sites were injected with 1% lidocaine with epinephrine  The anterolateral portal was established with a scalpel  The arthroscope was introduced through this portal  Under direct visualization, the anteromedial portal was established with a localizing needle followed by a scalpel  A probe was then introduced into the anteromedial portal  A systematic diagnostic arthroscopy evaluated the following:  medial compartment, notch, lateral compartment, patellofemoral compartment, medial gutter, and lateral gutter        The lateral meniscus tear was peripheral and longitudinal  The peripheral fragment demonstrated satisfactory bleeding following gentle abrasion and trephination  The adjacent synovium was abraded as well to facilitate healing  The lateral meniscus was repaired using 2 all-inside (Fast-fix 360) meniscus repair devices (CPT 30566)  The meniscus was probed following repair to ensure stability  There was no additional pathology  All particulate debris was removed  The knee was copiously rinsed and then drained  The portals were closed with an interrupted 4-0 monocryl suture  The skin was cleansed with sterile saline and dried before Steri-Strips were applied  Finally, a sterile dressing was secured by Webril and an Ace wrap, followed by a hinged knee brace locked in extension          I was present for the entire procedure, A qualified resident physician was not available and A physician assistant was required during the procedure for retraction tissue handling,dissection and suturing    Patient Disposition:  PACU     SIGNATURE: Jv Mendes DO  DATE: March 16, 2021  TIME: 11:14 AM

## 2021-03-16 NOTE — TELEPHONE ENCOUNTER
Returned call to wife Hilda Nata we did receive the [facility] info and it is now primary

## 2021-03-17 ENCOUNTER — TELEPHONE (OUTPATIENT)
Dept: OBGYN CLINIC | Facility: MEDICAL CENTER | Age: 31
End: 2021-03-17

## 2021-03-18 ENCOUNTER — TELEPHONE (OUTPATIENT)
Dept: OBGYN CLINIC | Facility: HOSPITAL | Age: 31
End: 2021-03-18

## 2021-03-18 DIAGNOSIS — S83.241A ACUTE MEDIAL MENISCUS TEAR OF RIGHT KNEE, INITIAL ENCOUNTER: ICD-10-CM

## 2021-03-18 RX ORDER — OXYCODONE HYDROCHLORIDE 5 MG/1
5 TABLET ORAL EVERY 4 HOURS PRN
Qty: 15 TABLET | Refills: 0 | Status: SHIPPED | OUTPATIENT
Start: 2021-03-18 | End: 2022-01-27 | Stop reason: ALTCHOICE

## 2021-03-18 NOTE — TELEPHONE ENCOUNTER
Please call patient instruct that I have refilled his oxycodone prescription to the home certain [facility]  Also  Instruct that based on the patient's medical history within his chart he would be allowed to take NSAIDs  If his wife is concerned that he is already on aspirin he is still out to take NSAIDs in addition to the aspirin for postoperative pain control and also to continue with the Tylenol and ice   Thank you

## 2021-03-18 NOTE — TELEPHONE ENCOUNTER
Rashi Dennis is calling asking for a refill of Oxycodone 5mg Patient hads 4 left  Patient would like that refilled for the weekend  Rashi Dennis would also like to know recommendations on what patient can also take due to level of pain  Patient can not take nsaids & is taking Tylenol  Patient is icing & elevating  Pain level is still a 6/10  Rashi Dennis would like the refill sent to [facility] in [place]   Please call patient with any questions or suggestions at [phone]

## 2021-03-18 NOTE — TELEPHONE ENCOUNTER
Spoke to patient  Advised of the oxycodone script refill  Patient cannot take NSAIDs due to mouth ulcers that form as an adverse reaction  Patient is currently taking 1000 mg tylenol Q8h, icing 15-20 mins hourly and elevating  Patient denies any drainage on the outside of the dressing, stated there is no calf redness, swelling, or heat to touch  Stated he has adjusted the ace bandage, it is snugg but not too tight  Stated his pain is 7/10 currently  Is there anything else we can recommend at this time for this PO patient?     Thanks

## 2021-03-18 NOTE — TELEPHONE ENCOUNTER
I called and spoke to the patient last night and discussed he cab weight bear as tolerated when locked in extension

## 2021-03-19 ENCOUNTER — TELEPHONE (OUTPATIENT)
Dept: OBGYN CLINIC | Facility: HOSPITAL | Age: 31
End: 2021-03-19

## 2021-03-19 NOTE — TELEPHONE ENCOUNTER
Patient sees Dr Vin Devlin called to have patient's physical therapy script faxed to [phone]      Please fax

## 2021-03-19 NOTE — TELEPHONE ENCOUNTER
There isn't anything we can really offer at this time  Patient can take 2- 5mg oxy tabs to help but encourage that it will decrease

## 2021-03-19 NOTE — TELEPHONE ENCOUNTER
Spoke to patient  He stated his pain is much better today  He stated he will use 2 of the oxycodone a recommended above if absolutely necessary but he hates taking them because they make him sick  Advised continued ice 20 mins at a time and elevation along with tylenol as he has been, 1000 mg Q8h no more than 3000 mg in 24 hrs  Patient verbalized understanding  Encouraged call back if needed

## 2021-03-24 ENCOUNTER — TELEPHONE (OUTPATIENT)
Dept: OBGYN CLINIC | Facility: HOSPITAL | Age: 31
End: 2021-03-24

## 2021-03-24 NOTE — TELEPHONE ENCOUNTER
Patient sees Dr Noble Peoples the physical therapist is calling in stating that the patient is having very limited range of motion  He is not able to do full extention so PT advised him to be non weight barring at this time  He did fax over the plan of care to the office  Just wanted the Dr to be aware of this information           Call back if needed# [phone]

## 2021-03-25 ENCOUNTER — TELEPHONE (OUTPATIENT)
Dept: FAMILY MEDICINE CLINIC | Facility: CLINIC | Age: 31
End: 2021-03-25

## 2021-03-25 ENCOUNTER — OFFICE VISIT (OUTPATIENT)
Dept: OBGYN CLINIC | Facility: MEDICAL CENTER | Age: 31
End: 2021-03-25

## 2021-03-25 VITALS
BODY MASS INDEX: 27.16 KG/M2 | HEIGHT: 71 IN | DIASTOLIC BLOOD PRESSURE: 90 MMHG | HEART RATE: 109 BPM | SYSTOLIC BLOOD PRESSURE: 155 MMHG | WEIGHT: 194 LBS

## 2021-03-25 DIAGNOSIS — M25.561 CHRONIC PAIN OF RIGHT KNEE: Primary | ICD-10-CM

## 2021-03-25 DIAGNOSIS — Z98.890 S/P LATERAL MENISCUS REPAIR OF RIGHT KNEE: Primary | ICD-10-CM

## 2021-03-25 DIAGNOSIS — G89.29 CHRONIC PAIN OF RIGHT KNEE: Primary | ICD-10-CM

## 2021-03-25 PROCEDURE — 99024 POSTOP FOLLOW-UP VISIT: CPT | Performed by: ORTHOPAEDIC SURGERY

## 2021-03-25 PROCEDURE — 3008F BODY MASS INDEX DOCD: CPT | Performed by: FAMILY MEDICINE

## 2021-03-25 RX ORDER — CELECOXIB 200 MG/1
200 CAPSULE ORAL DAILY
Qty: 90 CAPSULE | Refills: 1 | Status: SHIPPED | OUTPATIENT
Start: 2021-03-25

## 2021-03-25 NOTE — TELEPHONE ENCOUNTER
I can, and I would suggest 200mg daily with a meal - but, he has a listed Ibuprofen allergy  Please speak to Shashi Clemente about this before the med gets prescribed iain Toribio /   Mario

## 2021-03-25 NOTE — TELEPHONE ENCOUNTER
Called the patient and he stated that his allergy to Ibuprofen is that he get cancer sores  His Orthopedic suggested Celebrex my not affect him as much but he prefers to start it asap even with the allergy

## 2021-03-25 NOTE — PATIENT INSTRUCTIONS
Ask PCP about celebrex     Weight bear tolerated with brace locked in extension - ok to bear weight even if knee is not completely straight

## 2021-03-25 NOTE — TELEPHONE ENCOUNTER
Patient called and said that he just saw Dr Neo Olivarez his orthopedic, patient had a lateral meniscus repair right knee, and he was told to call PCP by ortho to see if you can send over Celbrex for him to help with inflammation  Please advise      Pharmacy is [facility]

## 2021-03-25 NOTE — PROGRESS NOTES
Knee Post Operative Visit     Assesment:     35 y o  male 9 days  s/p surgical arthroscopy of the right knee with lateral meniscus repair, DOS: 3/16/21, with some continued lateral joint line pain     Plan:    Post-Operative treatment:    Ice to knee for 20 minutes at least 1-2 times daily  New script written - PT for ROM/strengthening to knee, hip and core  Ask PCP about Celebrex for pain control if this improves his pain and is not a risk for mouth ulcers  Work note written     Discussed that  I doubt clinically there is any meniscus retear or hardware irritation or peroneal nerve irritation  If his pain persists these would be considered and MRI and possible EMG would be ordered in the future  Imaging: All imaging from today was reviewed by myself and explained to the patient  Weight bearing: as tolerated with brace locked in extension - ok to bear weight even if knee is not completely in extension  ROM: Follow per protocol 0-90 degrees    Brace:  Wear brace at all times other than for showers, Keep brace locked in extension for ambulation and May unlock brace for ROM exercises    DVT Prophylaxis:  Aspirin 325 mg oral twice daily x 6 weeks    Follow up:   3-4 weeks    Patient was advised that if they have any fevers, chills, chest pain, shortness of breath, redness or drainage from the incision, please let our office know immediately  Chief Complaint   Patient presents with    Right Knee - Post-op     History of Present Illness: The patient is a 35 y o  male who is being evaluated post operatively9 days  s/p surgical arthroscopy of the right knee with lateral meniscus repair, DOS: 3/16/21  Since the prior visit, He reports minimal improvement  He has been in a pretty significant amount of pain  The patient is using ice to control swelling  No numbness or tingling  They have started physical therapy  When he tries to bend and straighten the knee he feels a sharp pain on the lateral aspect of the knee  The patient Aspirin 325 mg oral twice daily x 6 weeks for DVT ppx  The patient has been ambulating with crutches  The patient has been ambulating with a brace  The patient denies any fevers, chills, calf pain, chest pain/shortness of breath, redness or drainage from the incision  I have reviewed the past medical, surgical, social and family history, medications and allergies as documented in the EMR  Review of systems: ROS is negative other than that noted in the HPI  Constitutional: Negative for fatigue and fever  Physical Exam:    Blood pressure 155/90, pulse (!) 109, height 5' 11" (1 803 m), weight 88 kg (194 lb)  General/Constitutional: NAD, well developed, well nourished  HENT: Normocephalic, atraumatic  CV: Intact distal pulses, regular rate  Resp: No respiratory distress or labored breathing  Lymphatic: No lymphadenopathy palpated  Neuro: Alert and Oriented x 3, no focal deficits  Psych: Normal mood, normal affect, normal judgement, normal behavior  Skin: Warm, dry, no rashes, no erythema    Knee Exam (focused):              RIGHT LEFT   ROM:   5-80 0-130   Palpation: Effusion moderate negative     MJL tenderness Positive Negative     LJL tenderness Positive Negative   Instability: Varus stable stable     Valgus stable stable   Special Tests: Lachman Negative Negative     Posterior drawer Negative Negative     Anterior drawer Negative Negative     Pivot shift not tested not tested     Dial not tested not tested   Patella: Palpation no tenderness no tenderness     Mobility 1/4 1/4     Apprehension Negative Negative   Other: Single leg 1/4 squat not tested not tested      Incisions show no erythema, no drainage    LE NV Exam: +2 DP/PT pulses bilaterally  Sensation intact to light touch L2-S1 bilaterally     Bilateral hip ROM demonstrates no pain actively or passively    No calf tenderness to palpation bilaterally    Scribe Attestation    I,:  Jaya Flynn Phuong am acting as a scribe while in the presence of the attending physician :       I,:  Tere Adorno, DO personally performed the services described in this documentation    as scribed in my presence :

## 2021-03-25 NOTE — LETTER
March 25, 2021     Patient: Arturo Leung   YOB: 1990   Date of Visit: 3/25/2021       To Whom it May Concern:    Arturo Leung is under my professional care  He was seen in my office on 3/25/2021  He should remain out of work at this time until next visit in 4 weeks  He will likely be out of work for 4-5 months in total      If you have any questions or concerns, please don't hesitate to call           Sincerely,          Wally Lindsey DO        CC: No Recipients

## 2021-04-02 DIAGNOSIS — Z23 ENCOUNTER FOR IMMUNIZATION: ICD-10-CM

## 2021-04-15 ENCOUNTER — OFFICE VISIT (OUTPATIENT)
Dept: OBGYN CLINIC | Facility: MEDICAL CENTER | Age: 31
End: 2021-04-15

## 2021-04-15 VITALS
HEIGHT: 71 IN | SYSTOLIC BLOOD PRESSURE: 141 MMHG | BODY MASS INDEX: 27.23 KG/M2 | HEART RATE: 105 BPM | WEIGHT: 194.5 LBS | DIASTOLIC BLOOD PRESSURE: 88 MMHG

## 2021-04-15 DIAGNOSIS — Z98.890 S/P LATERAL MENISCUS REPAIR OF RIGHT KNEE: Primary | ICD-10-CM

## 2021-04-15 PROCEDURE — 3008F BODY MASS INDEX DOCD: CPT | Performed by: ORTHOPAEDIC SURGERY

## 2021-04-15 PROCEDURE — 99024 POSTOP FOLLOW-UP VISIT: CPT | Performed by: ORTHOPAEDIC SURGERY

## 2021-04-15 NOTE — LETTER
April 15, 2021     Patient: Loida Christine   YOB: 1990   Date of Visit: 4/15/2021       To Whom it May Concern:    Loida Christine is under my professional care  He was seen in my office on 4/15/2021  He should remain out of work at this time due to recent knee surgery on 3/16/21  He will likely be out of work for 4-5 months from date of surgery  We will see him back in 4-6 weeks  If you have any questions or concerns, please don't hesitate to call           Sincerely,          Carlos Enrique Huggins DO        CC: No Recipients

## 2021-04-15 NOTE — PROGRESS NOTES
Knee Post Operative Visit     Assesment:     35 y o  male 4 weeks  s/p surgical arthroscopy of the right knee with lateral meniscus repair, DOS: 3/16/21, with continued lateral pain but overall slow improvement    Plan:    Post-Operative treatment:    Ice to knee for 20 minutes at least 1-2 times daily  PT for ROM/strengthening to knee, hip and core  If pain persists at next visit consider repeat MRI of knee    Imaging:    No imaging was available for review today  Weight bearing: work with PT to transition off crutches in next few weeks    ROM:  Full    Brace:   Keep brace locked in extension for ambulation and May unlock brace for ROM exercises - work with PT to transition out of brace when appropriate with protocol (at 6 week post op), can work on walking with unlocked brace with PT    DVT Prophylaxis:  Aspirin 325 mg oral twice daily x 6 weeks    Follow up:   4-6 weeks    Patient was advised that if they have any fevers, chills, chest pain, shortness of breath, redness or drainage from the incision, please let our office know immediately  Chief Complaint   Patient presents with    Right Knee - Follow-up     History of Present Illness: The patient is a 35 y o  male who is being evaluated post operatively  4 weeks  s/p surgical arthroscopy of the right knee with lateral meniscus repair, DOS: 3/16/21  Since the prior visit, He reports minimal improvement  He is having pain when he toes off laterally and anteriorly  Pain is well controlled  The patient is using ice to control swelling  They have started physical therapy  The patient Aspirin 325 mg oral twice daily x 6 weeks for DVT ppx  The patient has been ambulating with crutches  The patient has been ambulating with a brace  The patient denies any fevers, chills, calf pain, chest pain/shortness of breath, redness or drainage from the incision       I have reviewed the past medical, surgical, social and family history, medications and allergies as documented in the EMR  Review of systems: ROS is negative other than that noted in the HPI  Constitutional: Negative for fatigue and fever  Physical Exam:    Blood pressure 141/88, pulse 105, height 5' 11" (1 803 m), weight 88 2 kg (194 lb 8 oz)  General/Constitutional: NAD, well developed, well nourished  HENT: Normocephalic, atraumatic  CV: Intact distal pulses, regular rate  Resp: No respiratory distress or labored breathing  Lymphatic: No lymphadenopathy palpated  Neuro: Alert and Oriented x 3, no focal deficits  Psych: Normal mood, normal affect, normal judgement, normal behavior  Skin: Warm, dry, no rashes, no erythema    Knee Exam (focused):              RIGHT LEFT   ROM:   0-105 0-130   Palpation: Effusion small negative     MJL tenderness Negative Negative     LJL tenderness Positive Negative   Instability: Varus stable stable     Valgus stable stable   Special Tests: Lachman Negative Negative     Posterior drawer Negative Negative     Anterior drawer Negative Negative     Pivot shift not tested not tested     Dial not tested not tested   Patella: Palpation no tenderness no tenderness     Mobility 1/4 1/4     Apprehension Negative Negative   Other: Single leg 1/4 squat not tested not tested      Incisions show no erythema, no drainage    LE NV Exam: +2 DP/PT pulses bilaterally  Sensation intact to light touch L2-S1 bilaterally     Bilateral hip ROM demonstrates no pain actively or passively    No calf tenderness to palpation bilaterally    Scribe Attestation    I,:  Bhargavi Hall am acting as a scribe while in the presence of the attending physician :       I,:  Halie Perdue DO personally performed the services described in this documentation    as scribed in my presence :

## 2021-04-19 ENCOUNTER — TELEPHONE (OUTPATIENT)
Dept: OBGYN CLINIC | Facility: HOSPITAL | Age: 31
End: 2021-04-19

## 2021-04-20 NOTE — TELEPHONE ENCOUNTER
I called patient and left a voicemail stating that if he has transition out of his knee brace he may drive and if he feels comfortable operating a vehicle  and braking in an emergency situation  I instructed that if he is still in the brace he would need to remove this to drive and again would have to feel safe operating the car and able to brake in an emergency situation  He can call back with questions or if he needs a note   Thank you

## 2021-05-11 ENCOUNTER — TELEPHONE (OUTPATIENT)
Dept: OBGYN CLINIC | Facility: HOSPITAL | Age: 31
End: 2021-05-11

## 2021-05-11 NOTE — TELEPHONE ENCOUNTER
Nora Soares at [employer]   Requests copy of RX for T[employer] unit and supplies,  Ph [phone]  Fax [phone]

## 2021-05-17 ENCOUNTER — TELEPHONE (OUTPATIENT)
Dept: OBGYN CLINIC | Facility: HOSPITAL | Age: 31
End: 2021-05-17

## 2021-05-20 ENCOUNTER — OFFICE VISIT (OUTPATIENT)
Dept: OBGYN CLINIC | Facility: MEDICAL CENTER | Age: 31
End: 2021-05-20

## 2021-05-20 VITALS
HEART RATE: 93 BPM | SYSTOLIC BLOOD PRESSURE: 141 MMHG | DIASTOLIC BLOOD PRESSURE: 92 MMHG | WEIGHT: 194.5 LBS | HEIGHT: 71 IN | BODY MASS INDEX: 27.23 KG/M2

## 2021-05-20 DIAGNOSIS — Z98.890 S/P LATERAL MENISCUS REPAIR OF RIGHT KNEE: Primary | ICD-10-CM

## 2021-05-20 PROCEDURE — 99024 POSTOP FOLLOW-UP VISIT: CPT | Performed by: ORTHOPAEDIC SURGERY

## 2021-05-20 NOTE — PROGRESS NOTES
Knee Post Operative Visit     Assesment:     35 y o  male 5 weeks s/p surgical arthroscopy of the right knee with lateral meniscus repair, DOS: 3/16/21, with some continued pain laterally    Plan:    Post-Operative treatment:    Ice to knee for 20 minutes at least 1-2 times daily  PT for ROM/strengthening to knee, hip and core  Call back if no improvement in the next few weeks if pain persists  Work note    Imaging: We may repeat an MRI if no improvement  To rule out any re-tear of lateral meniscus    Weight bearing:  as tolerated     ROM:  Full    Brace:  No brace needed    Follow up: 6 weeks    Patient was advised that if they have any fevers, chills, chest pain, shortness of breath, redness or drainage from the incision, please let our office know immediately  Chief Complaint   Patient presents with    Right Knee - Post-op, Follow-up       History of Present Illness: The patient is a 35 y o  male who is being evaluated post operatively 9 weeks s/p surgical arthroscopy of the right knee with lateral meniscus repair, DOS: 3/16/21  Since the prior visit, He reports some improvement  He has been able to complete short walks  He has a shooting pain when he steps wrong on the lateral aspect of the knee that extends down to the foot  Pain is well controlled  The patient is using ice to control swelling  They have started physical therapy  The patient Ambulation for DVT ppx  The patient has been ambulating without crutches  The patient has been ambulating without a brace  The patient denies any fevers, chills, calf pain, chest pain/shortness of breath, redness or drainage from the incision  I have reviewed the past medical, surgical, social and family history, medications and allergies as documented in the EMR  Review of systems: ROS is negative other than that noted in the HPI  Constitutional: Negative for fatigue and fever         Physical Exam:    Blood pressure 141/92, pulse 93, height 5' 11" (1 803 m), weight 88 2 kg (194 lb 8 oz)  General/Constitutional: NAD, well developed, well nourished  HENT: Normocephalic, atraumatic  CV: Intact distal pulses, regular rate  Resp: No respiratory distress or labored breathing  Lymphatic: No lymphadenopathy palpated  Neuro: Alert and Oriented x 3, no focal deficits  Psych: Normal mood, normal affect, normal judgement, normal behavior  Skin: Warm, dry, no rashes, no erythema    Knee Exam (focused):              RIGHT LEFT   ROM:   0-130 0-130   Palpation: Effusion negative negative     MJL tenderness Positive Negative     LJL tenderness Positive Negative   Instability: Varus stable stable     Valgus stable stable   Special Tests: Lachman Negative Negative     Posterior drawer Negative Negative     Anterior drawer Negative Negative     Pivot shift not tested not tested     Dial not tested not tested   Patella: Palpation no tenderness no tenderness     Mobility 1/4 1/4     Apprehension Negative Negative   Other: Single leg 1/4 squat not tested not tested      Incisions show no erythema, no drainage    LE NV Exam: +2 DP/PT pulses bilaterally  Sensation intact to light touch L2-S1 bilaterally     Bilateral hip ROM demonstrates no pain actively or passively    No calf tenderness to palpation bilaterally    Scribe Attestation    I,:  Lo Kyle am acting as a scribe while in the presence of the attending physician :       I,:  Yunior Perdue, DO personally performed the services described in this documentation    as scribed in my presence :

## 2021-05-20 NOTE — LETTER
May 20, 2021     Patient: Olivia Leung   YOB: 1990   Date of Visit: 5/20/2021       To Whom it May Concern:    Olivia Leung is under my professional care  He was seen in my office on 5/20/2021  He should remain out of work at this time until next follow up in 6 weeks  If you have any questions or concerns, please don't hesitate to call           Sincerely,          Margarita Davies DO        CC: No Recipients

## 2021-07-01 ENCOUNTER — OFFICE VISIT (OUTPATIENT)
Dept: OBGYN CLINIC | Facility: MEDICAL CENTER | Age: 31
End: 2021-07-01
Payer: OTHER MISCELLANEOUS

## 2021-07-01 VITALS
HEART RATE: 83 BPM | BODY MASS INDEX: 27.16 KG/M2 | HEIGHT: 71 IN | SYSTOLIC BLOOD PRESSURE: 131 MMHG | WEIGHT: 194 LBS | DIASTOLIC BLOOD PRESSURE: 80 MMHG

## 2021-07-01 DIAGNOSIS — Z98.890 S/P LATERAL MENISCUS REPAIR OF RIGHT KNEE: Primary | ICD-10-CM

## 2021-07-01 DIAGNOSIS — S84.11XA INJURY OF RIGHT PERONEAL NERVE, INITIAL ENCOUNTER: ICD-10-CM

## 2021-07-01 PROCEDURE — 99213 OFFICE O/P EST LOW 20 MIN: CPT | Performed by: ORTHOPAEDIC SURGERY

## 2021-07-01 NOTE — PROGRESS NOTES
Knee Post Operative Visit     Assesment:     35 y o  male  3 5 months s/p surgical arthroscopy of the right knee with lateral meniscus repair, DOS: 3/16/21, with continued pain   With numbness and tingling     -concern for peroneal nerve injury vs IT band syndrome  Versus continued tearing or nonhealing meniscus tear  Plan:    Post-Operative treatment:    Work note written  Refrain from PT at this time until imaging     Imaging:     MRI arthrogram of right knee ordered to rule out re-tear   EMG of lower extremity ordered to rule out peroneal nerve injruy    Follow up: After imaging    Patient was advised that if they have any fevers, chills, chest pain, shortness of breath, redness or drainage from the incision, please let our office know immediately  Chief Complaint   Patient presents with    Right Knee - Follow-up     History of Present Illness: The patient is a 35 y o  male who is being evaluated post operatively 3 5 months s/p surgical arthroscopy of the right knee with lateral meniscus repair, DOS: 3/16/21  Since the prior visit, He reports minimal improvement  Patient continues to have lateral-sided radiating knee pain  He also states that he has some numbness and tingling that extends into the foot occasionally  This is present when he pushes off his toes or pivots  He also notes that his knee has been giving out due to pain  Patient denies any clicking catching or locking  Pain is well controlled  The patient is using ice to control swelling  They have started physical therapy  The patient Ambulation for DVT ppx  The patient has been ambulating without crutches  The patient has been ambulating without a brace  The patient denies any fevers, chills, calf pain, chest pain/shortness of breath, redness or drainage from the incision  I have reviewed the past medical, surgical, social and family history, medications and allergies as documented in the EMR      Review of systems: ROS is negative other than that noted in the HPI  Constitutional: Negative for fatigue and fever  Physical Exam:    Blood pressure 131/80, pulse 83, height 5' 11" (1 803 m), weight 88 kg (194 lb)  General/Constitutional: NAD, well developed, well nourished  HENT: Normocephalic, atraumatic  CV: Intact distal pulses, regular rate  Resp: No respiratory distress or labored breathing  Lymphatic: No lymphadenopathy palpated  Neuro: Alert and Oriented x 3, no focal deficits  Psych: Normal mood, normal affect, normal judgement, normal behavior  Skin: Warm, dry, no rashes, no erythema    Knee Exam (focused):              RIGHT LEFT   ROM:   0-130 0-130   Palpation: Effusion negative negative     MJL tenderness Negative Negative     LJL tenderness Positive Negative   Instability: Varus stable stable     Valgus stable stable   Special Tests: Lachman Negative Negative     Posterior drawer Negative Negative     Anterior drawer Negative Negative     Pivot shift not tested not tested     Dial not tested not tested   Patella: Palpation no tenderness no tenderness     Mobility 1/4 1/4     Apprehension Negative Negative   Other: Single leg 1/4 squat not tested not tested        Incisions show no erythema, no drainage    LE NV Exam: +2 DP/PT pulses bilaterally  Sensation intact to light touch L2-S1 bilaterally     Bilateral hip ROM demonstrates no pain actively or passively    No calf tenderness to palpation bilaterally    Scribe Attestation    I,:  Chuy Cesar am acting as a scribe while in the presence of the attending physician :       I,:  Ever Perdue, DO personally performed the services described in this documentation    as scribed in my presence :

## 2021-07-01 NOTE — LETTER
July 1, 2021     Patient: Enrrique Deluna   YOB: 1990   Date of Visit: 7/1/2021       To Whom it May Concern:    Enrrique Deluna is under my professional care  He was seen in my office on 7/1/2021  He should remain out of work at this time until next follow up  If you have any questions or concerns, please don't hesitate to call           Sincerely,          Lisa Rosen DO        CC: No Recipients

## 2021-07-01 NOTE — LETTER
July 1, 2021     Patient: Carrol Rushing   YOB: 1990   Date of Visit: 7/1/2021       To Whom it May Concern:    Carrol Rushing is under my professional care  He was seen in my office on 7/1/2021  He should refrain from PT at this time until further imaging is done  If you have any questions or concerns, please don't hesitate to call           Sincerely,          Christine Hopper DO        CC: No Recipients

## 2021-07-06 ENCOUNTER — TELEPHONE (OUTPATIENT)
Dept: OBGYN CLINIC | Facility: HOSPITAL | Age: 31
End: 2021-07-06

## 2021-07-06 NOTE — TELEPHONE ENCOUNTER
Patient sees Dr Perdue    Patients wife called to find out about an appt for a EMG of patients R Knee     - [phone]

## 2021-07-07 NOTE — TELEPHONE ENCOUNTER
Prosper Bernard  Would you be able to reach out to this patient and get him scheduled for an EMG? I am hoping to have this done in the next couple of weeks if possible  Thanks!

## 2021-07-13 ENCOUNTER — TELEPHONE (OUTPATIENT)
Dept: OBGYN CLINIC | Facility: MEDICAL CENTER | Age: 31
End: 2021-07-13

## 2021-07-13 NOTE — NURSING NOTE
RN attempted to contact patient regarding upcoming right knee arthrogram at [facility]  Message left  Radiology RN's name and call back number provided in message

## 2021-07-13 NOTE — TELEPHONE ENCOUNTER
[facility] called in requesting OVN to be faxed           Fax per request to #[phone] att: [id number]

## 2021-07-13 NOTE — NURSING NOTE
Received a call back from patient to discuss upcoming procedure  Allergies reviewed and verified that patient does not currently take any anticoagulant medications  Per patient has stopped taking  mg daily  Pre procedure instructions including diet, taking own medications and need for  discussed with patient  Patient instructed that he may continue a regular diet and take medications as usual before the procedure  Procedure and post procedure expectations and instructions reviewed with the patient  Patient verbalizes understanding  Per patient no further questions at this time  Reminded of the location, date and time of the expected procedure  Contact number provided in case patient has any further questions

## 2021-07-21 ENCOUNTER — HOSPITAL ENCOUNTER (OUTPATIENT)
Dept: RADIOLOGY | Facility: HOSPITAL | Age: 31
Discharge: HOME | End: 2021-07-21
Admitting: RADIOLOGY
Payer: OTHER MISCELLANEOUS

## 2021-07-21 ENCOUNTER — HOSPITAL ENCOUNTER (OUTPATIENT)
Dept: MRI IMAGING | Facility: HOSPITAL | Age: 31
Discharge: HOME | End: 2021-07-21
Payer: OTHER MISCELLANEOUS

## 2021-07-21 DIAGNOSIS — Z98.890 S/P LATERAL MENISCUS REPAIR OF RIGHT KNEE: ICD-10-CM

## 2021-07-21 DIAGNOSIS — S84.11XA INJURY OF RIGHT PERONEAL NERVE, INITIAL ENCOUNTER: ICD-10-CM

## 2021-07-21 PROCEDURE — 27369 NJX CNTRST KNE ARTHG/CT/MRI: CPT

## 2021-07-21 PROCEDURE — 73722 MRI JOINT OF LWR EXTR W/DYE: CPT

## 2021-07-21 PROCEDURE — 77002 NEEDLE LOCALIZATION BY XRAY: CPT

## 2021-07-21 PROCEDURE — G1004 CDSM NDSC: HCPCS

## 2021-07-21 PROCEDURE — A9585 GADOBUTROL INJECTION: HCPCS | Performed by: ORTHOPAEDIC SURGERY

## 2021-07-21 RX ORDER — LIDOCAINE HYDROCHLORIDE 10 MG/ML
7 INJECTION, SOLUTION EPIDURAL; INFILTRATION; INTRACAUDAL; PERINEURAL
Status: DISCONTINUED | OUTPATIENT
Start: 2021-07-21 | End: 2021-07-22 | Stop reason: HOSPADM

## 2021-07-21 RX ADMIN — GADOBUTROL 0.2 ML: 604.72 INJECTION INTRAVENOUS at 14:53

## 2021-07-21 RX ADMIN — IOHEXOL 5 ML: 300 INJECTION, SOLUTION INTRAVENOUS at 14:53

## 2021-07-27 ENCOUNTER — TELEPHONE (OUTPATIENT)
Dept: OBGYN CLINIC | Facility: HOSPITAL | Age: 31
End: 2021-07-27

## 2021-07-27 NOTE — TELEPHONE ENCOUNTER
Cl Saunders from [facility] is calling for the patient's next appt  I let him know he did not have anything at this so Cl Saunders scheduled him following the last test which is emg

## 2021-08-10 ENCOUNTER — HOSPITAL ENCOUNTER (OUTPATIENT)
Dept: NEUROLOGY | Facility: CLINIC | Age: 31
Discharge: HOME | End: 2021-08-10
Payer: OTHER MISCELLANEOUS

## 2021-08-10 DIAGNOSIS — Z98.890 S/P LATERAL MENISCUS REPAIR OF RIGHT KNEE: ICD-10-CM

## 2021-08-10 DIAGNOSIS — S84.11XA INJURY OF RIGHT PERONEAL NERVE, INITIAL ENCOUNTER: ICD-10-CM

## 2021-08-10 PROCEDURE — 95910 NRV CNDJ TEST 7-8 STUDIES: CPT | Performed by: PSYCHIATRY & NEUROLOGY

## 2021-08-10 PROCEDURE — 95886 MUSC TEST DONE W/N TEST COMP: CPT | Performed by: PSYCHIATRY & NEUROLOGY

## 2021-08-16 ENCOUNTER — OFFICE VISIT (OUTPATIENT)
Dept: OBGYN CLINIC | Facility: MEDICAL CENTER | Age: 31
End: 2021-08-16
Payer: OTHER MISCELLANEOUS

## 2021-08-16 VITALS
BODY MASS INDEX: 27.16 KG/M2 | HEART RATE: 102 BPM | WEIGHT: 194 LBS | SYSTOLIC BLOOD PRESSURE: 143 MMHG | DIASTOLIC BLOOD PRESSURE: 81 MMHG | HEIGHT: 71 IN

## 2021-08-16 DIAGNOSIS — Z98.890 S/P LATERAL MENISCUS REPAIR OF RIGHT KNEE: Primary | ICD-10-CM

## 2021-08-16 PROCEDURE — 99213 OFFICE O/P EST LOW 20 MIN: CPT | Performed by: ORTHOPAEDIC SURGERY

## 2021-08-16 NOTE — PATIENT INSTRUCTIONS
Manish Mari MD, FACS, [address] [place], [address] [facility]  7th Floor  [address]   A facility of [facility] AT [facility]  Map Call [address] [facility]  [address]th Floor  [address]  [place], [place]   Call [phone]

## 2021-08-16 NOTE — LETTER
August 16, 2021     Patient: Jacque Blanco   YOB: 1990   Date of Visit: 8/16/2021       To Whom it May Concern:    Jacque Blanco is under my professional care  He was seen in my office on 8/16/2021  He should remain out of work at this time due to a nerve injury from surgery  He will be seen at [facility] [address] regarding this for treatment and follow up care  He will return to our office in 6-8 weeks for re-evaluation  If you have any questions or concerns, please don't hesitate to call           Sincerely,          Balta Sanon DO        CC: No Recipients

## 2021-08-16 NOTE — PROGRESS NOTES
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 with continued pain and numbness and tingling in that distrubtion    Plan:    Post-Operative treatment:    Referral to Dr Hermilo Griggs at [facility] [address] for opinion on peroneal nerve exploration/repair versus continued observation s/p lateral menisectomy   Work note written    I discussed with the patient that this nerve injury has not improved over the last several months  I would like him to be able to be seen by Dr Hermilo Griggs for further evaluation and guidance  I also would like to see him back in 5-6 weeks to assess the knee  We will also have to assess for any pain that could be coming from intra articular origin if lateral joint line pain ever persists  At this time pain is likely from nerve and MRI was negative -we discussed that I currently feel pain is all coming from the nerve and not the joint, but we can assess both going forward after seeing Dr Hermilo Griggs at [address]: All imaging from today was reviewed by myself and explained to the patient  Follow up:   6-8 weeks    Patient was advised that if they have any fevers, chills, chest pain, shortness of breath, redness or drainage from the incision, please let our office know immediately  Chief Complaint   Patient presents with    Right Knee - Follow-up     History of Present Illness: The patient is a 35 y o  male who is being evaluated post operatively 5 months s/p surgical arthroscopy of the right knee with lateral meniscus repair, DOS: 3/16/21  He returns after the EMG  He notes that he has numbness and tingling over the lateral aspect of the knee which extends all the way down to his foot and is on the top of the foot  Since the prior visit, He reports minimal improvement   He feels that within the joint he has improvement in mobility and pain overall since surgery, but the lateral radiating pain has not improved at all  His pain continues to be lateral to and below the joint with numbness and tingling on lateral lower leg and top of foot  The patient denies any fevers, chills, calf pain, chest pain/shortness of breath, redness or drainage from the incision  I have reviewed the past medical, surgical, social and family history, medications and allergies as documented in the EMR  Review of systems: ROS is negative other than that noted in the HPI  Constitutional: Negative for fatigue and fever  Physical Exam:    Blood pressure 143/81, pulse 102, height 5' 11" (1 803 m), weight 88 kg (194 lb)  General/Constitutional: NAD, well developed, well nourished  HENT: Normocephalic, atraumatic  CV: Intact distal pulses, regular rate  Resp: No respiratory distress or labored breathing  Lymphatic: No lymphadenopathy palpated  Neuro: Alert and Oriented x 3, no focal deficits  Psych: Normal mood, normal affect, normal judgement, normal behavior  Skin: Warm, dry, no rashes, no erythema    Knee Exam (focused):              RIGHT LEFT   ROM:   0-130 0-130   Palpation: Effusion negative negative     MJL tenderness Negative Negative     LJL tenderness Negative Negative   Instability: Varus stable stable     Valgus stable stable   Special Tests: Lachman Negative Negative     Posterior drawer Negative Negative     Anterior drawer Negative Negative     Pivot shift not tested not tested     Dial not tested not tested   Patella: Palpation no tenderness no tenderness     Mobility 1/4 1/4     Apprehension Negative Negative   Other: Single leg 1/4 squat not tested not tested      Incisions show no erythema, no drainage    Pain with palpation over fibular head, distal to fibular head    Radiating pain reproduced with deep posterolateral joint palpation    5/5 Tib ant/gastroc/ehl/quad/hamstring strength,5/5 ankle eversion and inversion     +2 DP/PT pulses bilaterally  Sensation intact to light touch L2-S1      Bilateral hip ROM demonstrates no pain actively or passively    No calf tenderness to palpation bilaterally    Imaging: The EMG/ NCV study of the right lower extremity with comparison to the left is abnormal  There is electrodiagnostic evidence of a 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

## 2021-09-12 ENCOUNTER — APPOINTMENT (OUTPATIENT)
Dept: LAB | Facility: CLINIC | Age: 31
End: 2021-09-12

## 2021-09-12 DIAGNOSIS — Z00.8 HEALTH EXAMINATION IN POPULATION SURVEY: ICD-10-CM

## 2021-09-12 LAB
CHOLEST SERPL-MCNC: 181 MG/DL (ref 50–200)
EST. AVERAGE GLUCOSE BLD GHB EST-MCNC: 97 MG/DL
HBA1C MFR BLD: 5 %
HDLC SERPL-MCNC: 51 MG/DL
LDLC SERPL CALC-MCNC: 109 MG/DL (ref 0–100)
NONHDLC SERPL-MCNC: 130 MG/DL
TRIGL SERPL-MCNC: 104 MG/DL

## 2021-09-12 PROCEDURE — 36415 COLL VENOUS BLD VENIPUNCTURE: CPT

## 2021-09-12 PROCEDURE — 83036 HEMOGLOBIN GLYCOSYLATED A1C: CPT

## 2021-09-12 PROCEDURE — 80061 LIPID PANEL: CPT

## 2021-09-27 ENCOUNTER — OFFICE VISIT (OUTPATIENT)
Dept: OBGYN CLINIC | Facility: MEDICAL CENTER | Age: 31
End: 2021-09-27
Payer: OTHER MISCELLANEOUS

## 2021-09-27 VITALS
HEART RATE: 96 BPM | HEIGHT: 71 IN | DIASTOLIC BLOOD PRESSURE: 94 MMHG | SYSTOLIC BLOOD PRESSURE: 137 MMHG | BODY MASS INDEX: 29.54 KG/M2 | WEIGHT: 211 LBS

## 2021-09-27 DIAGNOSIS — S84.11XD INJURY OF RIGHT PERONEAL NERVE, SUBSEQUENT ENCOUNTER: ICD-10-CM

## 2021-09-27 DIAGNOSIS — Z98.890 S/P LATERAL MENISCUS REPAIR OF RIGHT KNEE: Primary | ICD-10-CM

## 2021-09-27 PROCEDURE — 99213 OFFICE O/P EST LOW 20 MIN: CPT | Performed by: ORTHOPAEDIC SURGERY

## 2021-09-27 NOTE — LETTER
September 27, 2021     Patient: Yash Farnsworth   YOB: 1990   Date of Visit: 9/27/2021       To Whom it May Concern:    Yash Farnsworth is under my professional care  He was seen in my office on 9/27/2021  He  should remain out of work at this time due to a nerve injury from surgery  He will return to our office in 6-8 weeks for re-evaluation  If you have any questions or concerns, please don't hesitate to call           Sincerely,          Doris Lizarraga DO        CC: Yash Farnsworth

## 2021-09-27 NOTE — PROGRESS NOTES
Ortho Sports Medicine Knee Follow Up Visit     Assesment:     35 y o  male 6 months s/p surgical arthroscopy of the right knee with lateral meniscus repair, DOS: 3/16/21, right peroneal nerve decompression 8/31/21 Dr Chong [facility]    Plan:    Conservative treatment:    Ice to knee for 20 minutes at least 1-2 times daily  PT written  OTC NSAIDS prn for pain  Work note written    Imaging: All imaging from today was reviewed by myself and explained to the patient  Injection:    No Injection planned at this time  Surgery:       He is continuing to improve symptomatically with his distal nerve related symptoms and muscle strength from the peroneal nerve decompression  I explained to him that I would still like to keep an eye on his lateral joint line pain to confirm that he does not have any painful hardware from the prior meniscus repair  If this pain persists we can eventually consider an exploratory arthroscopy  With possible removal of hardware  He is improving so the hope is that his pain on the lateral joint line also starts to improve as the peroneal nerve pain and tenderness dissipates  We will continue to keep an eye on this and only move forward if absolutely necessary and conservative treatment continues to result in any lateral joint line pain  Follow up:    Return in about 2 months (around 11/27/2021) for Post-op, Recheck  Chief Complaint   Patient presents with    Right Knee - Follow-up       History of Present Illness: The patient is returns for follow up of 6 months s/p surgical arthroscopy of the right knee with lateral meniscus repair, DOS: 3/16/21   Since the prior visit, He reports significant improvement  On 8/31/21 patient underwent right peroneal nerve exploration and decompression with Dr Chong with [facility]  Op note states there was no peroneal nerve laceration but there was scar tissue that was debrided       Patient states that since his procedure he has had significant improvement pain to the lateral aspect of the right lower leg  Patient states that he no longer has sharp stabbing pain constantly about the leg that would become worse with stairs squatting  He will only get this sensation with increased activity  The pain also does not go to the top of the foot but just to the ankle at this time  He states that the intensity of the sharp pain is not as intense as previously as well  Patient notes that he is also able to perform better control of his ankle with plantar flexion and dorsiflexion  Patient states that the knee is doing well at this time  Patient denies having any instability about the knee or having any clicking or catching  Pain is improved by rest, ice, NSAIDS and physical therapy  Pain is aggravated by stairs, squatting, weight bearing and walking  Symptoms include swelling  The patient has tried rest, ice, NSAIDS, physical therapy and bracing            Knee Surgical History:  See above    Past Medical, Social and Family History:  Past Medical History:   Diagnosis Date    PONV (postoperative nausea and vomiting)     3/11/21 Pt states "deals with slight nausea after anesthesia "     Past Surgical History:   Procedure Laterality Date    ANKLE SURGERY Bilateral     CARPAL TUNNEL RELEASE Right     FL INJECTION RIGHT KNEE (ARTHROGRAM)  7/21/2021    KNEE ARTHROSCOPY Right     KNEE SURGERY Right     [place] KNEE SCOPE,MED OR LAT MENIS REPAIR Right 3/16/2021    Procedure: ARTHROSCOPIC LATERAL MENISCUS REPAIR;  Surgeon: Piper Espino DO;  Location: [address] [place] [address] MAIN OR;  Service: Orthopedics    [place] REVISE MEDIAN N/CARPAL TUNNEL SURG Right 9/20/2018    Procedure: RELEASE CARPAL TUNNEL;  Surgeon: Víctor Cha MD;  Location:  MAIN OR;  Service: Orthopedics    SYNOVECTOMY Right 9/20/2018    Procedure: TENOSYNOVECTOMY Flexor digitorum superficialis to index, long, ring and small finger; Flexor digitorum profundus to index, long, ring and small finger; Flexor pollicis longus;  Surgeon: Víctor Cha MD;  Location: [facility] OR;  Service: Orthopedics    TONSILLECTOMY AND ADENOIDECTOMY       Allergies   Allergen Reactions    Ibuprofen Other (See Comments)     Mouth sores       Current Outpatient Medications on File Prior to Visit   Medication Sig Dispense Refill    acetaminophen (TYLENOL) 500 mg tablet Take 500 mg by mouth every 6 (six) hours as needed for mild pain      DAILY MULTIPLE VITAMINS PO Take by mouth daily       aspirin (ECOTRIN) 325 mg EC tablet Take 1 tablet (325 mg total) by mouth daily (Patient not taking: Reported on 8/16/2021) 30 tablet 0    celecoxib (CeleBREX) 200 mg capsule Take 1 capsule (200 mg total) by mouth daily (Patient not taking: Reported on 9/27/2021) 90 capsule 1    ondansetron (ZOFRAN) 4 mg tablet Take 1 tablet (4 mg total) by mouth every 8 (eight) hours as needed for nausea or vomiting (Patient not taking: Reported on 8/16/2021) 20 tablet 0    oxyCODONE (ROXICODONE) 5 mg immediate release tablet Take 1 tablet (5 mg total) by mouth every 4 (four) hours as needed for moderate pain for up to 15 dosesMax Daily Amount: 30 mg (Patient not taking: Reported on 8/16/2021) 15 tablet 0     No current facility-administered medications on file prior to visit  Social History     Socioeconomic History    Marital status: /Civil Union     Spouse name: Not on file    Number of children: Not on file    Years of education: Not on file    Highest education level: Not on file   Occupational History    Not on file   Tobacco Use    Smoking status: Never Smoker    Smokeless tobacco: Never Used    Tobacco comment: Denies   Vaping Use    Vaping Use: Never used   Substance and Sexual Activity    Alcohol use:  Yes     Alcohol/week: 3 0 standard drinks     Types: 3 Cans of beer per week     Comment: social    Drug use: No     Comment: denies    Sexual activity: Not on file   Other Topics Concern    Not on file   Social History Narrative    Not on file     Social Determinants of Health     Financial Resource Strain:     Difficulty of Paying Living Expenses:    Food Insecurity:     Worried About Running Out of Food in the Last Year:     [address] in the Last Year:    Transportation Needs:     Lack of Transportation (Medical):  Lack of Transportation (Non-Medical):    Physical Activity:     Days of Exercise per Week:     Minutes of Exercise per Session:    Stress:     Feeling of Stress :    Social Connections:     Frequency of Communication with Friends and Family:     Frequency of Social Gatherings with Friends and Family:     Attends Gnosticism Services:     Active Member of Clubs or Organizations:     Attends Club or Organization Meetings:     Marital Status:    Intimate Partner Violence:     Fear of Current or Ex-Partner:     Emotionally Abused:     Physically Abused:     Sexually Abused:          I have reviewed the past medical, surgical, social and family history, medications and allergies as documented in the EMR  Review of systems: ROS is negative other than that noted in the HPI  Constitutional: Negative for fatigue and fever  Physical Exam:    Blood pressure 137/94, pulse 96, height 5' 11" (1 803 m), weight 95 7 kg (211 lb)  General/Constitutional: NAD, well developed, well nourished  HENT: Normocephalic, atraumatic  CV: Intact distal pulses, regular rate  Resp: No respiratory distress or labored breathing  Lymphatic: No lymphadenopathy palpated  Neuro: Alert and Oriented x 3, no focal deficits  Psych: Normal mood, normal affect, normal judgement, normal behavior  Skin: Warm, dry, no rashes, no erythema      Knee Exam (focused): RIGHT LEFT   ROM:   0-130 0-130   Palpation: Effusion negative negative     MJL tenderness Negative Negative     LJL tenderness Positive Negative   Meniscus:  Sneha Negative Negative    Apley's Compression Negative Negative   Instability: Varus stable stable     Valgus stable stable   Special Tests: Lachman Negative Negative     Posterior drawer Negative Negative     Anterior drawer Negative Negative     Pivot shift not tested not tested     Dial not tested not tested   Patella: Palpation no tenderness no tenderness     Mobility 1/4 1/4     Apprehension Negative Negative   Other: Single leg 1/4 squat not tested not tested      Scope Incisions are well healed     Right leg: new incision present on the posterior aspect of the distal tight that extends to the lateral aspect over the proximal fibula it is without erythema or ecchymosis incision is closed without drainage, non-ttp on incision, ttp just distal to the distal aspect of the incision, able to actively dorsiflex and plantiflex the ankle 5/5 ta/g, EHL/FHL 5/5     LE NV Exam: +2 DP/PT pulses bilaterally  Sensation intact to light touch L2-S1 bilaterally    No calf tenderness to palpation bilaterally      Knee Imaging    No imaging was performed today

## 2021-11-08 ENCOUNTER — TELEPHONE (OUTPATIENT)
Dept: OBGYN CLINIC | Facility: MEDICAL CENTER | Age: 31
End: 2021-11-08

## 2021-11-12 ENCOUNTER — IMMUNIZATIONS (OUTPATIENT)
Dept: FAMILY MEDICINE CLINIC | Facility: CLINIC | Age: 31
End: 2021-11-12
Payer: COMMERCIAL

## 2021-11-12 DIAGNOSIS — Z23 ENCOUNTER FOR IMMUNIZATION: Primary | ICD-10-CM

## 2021-11-12 PROCEDURE — 90686 IIV4 VACC NO PRSV 0.5 ML IM: CPT

## 2021-11-12 PROCEDURE — 90471 IMMUNIZATION ADMIN: CPT

## 2021-11-29 ENCOUNTER — OFFICE VISIT (OUTPATIENT)
Dept: OBGYN CLINIC | Facility: MEDICAL CENTER | Age: 31
End: 2021-11-29
Payer: OTHER MISCELLANEOUS

## 2021-11-29 VITALS
DIASTOLIC BLOOD PRESSURE: 105 MMHG | BODY MASS INDEX: 29.54 KG/M2 | HEART RATE: 123 BPM | WEIGHT: 211 LBS | SYSTOLIC BLOOD PRESSURE: 155 MMHG | HEIGHT: 71 IN

## 2021-11-29 DIAGNOSIS — S84.11XD INJURY OF RIGHT PERONEAL NERVE, SUBSEQUENT ENCOUNTER: ICD-10-CM

## 2021-11-29 DIAGNOSIS — Z98.890 S/P LATERAL MENISCUS REPAIR OF RIGHT KNEE: Primary | ICD-10-CM

## 2021-11-29 PROCEDURE — 99213 OFFICE O/P EST LOW 20 MIN: CPT | Performed by: ORTHOPAEDIC SURGERY

## 2021-12-15 ENCOUNTER — TELEPHONE (OUTPATIENT)
Dept: OBGYN CLINIC | Facility: HOSPITAL | Age: 31
End: 2021-12-15

## 2022-01-06 ENCOUNTER — IMMUNIZATIONS (OUTPATIENT)
Dept: FAMILY MEDICINE CLINIC | Facility: HOSPITAL | Age: 32
End: 2022-01-06

## 2022-01-06 DIAGNOSIS — Z23 ENCOUNTER FOR IMMUNIZATION: Primary | ICD-10-CM

## 2022-01-06 PROCEDURE — 0001A COVID-19 PFIZER VACC 0.3 ML: CPT

## 2022-01-06 PROCEDURE — 91300 COVID-19 PFIZER VACC 0.3 ML: CPT

## 2022-01-13 ENCOUNTER — TELEMEDICINE (OUTPATIENT)
Dept: FAMILY MEDICINE CLINIC | Facility: CLINIC | Age: 32
End: 2022-01-13
Payer: COMMERCIAL

## 2022-01-13 VITALS — TEMPERATURE: 97.4 F | BODY MASS INDEX: 28.42 KG/M2 | WEIGHT: 203 LBS | HEIGHT: 71 IN

## 2022-01-13 DIAGNOSIS — M54.9 UPPER BACK PAIN: ICD-10-CM

## 2022-01-13 DIAGNOSIS — U07.1 COVID-19: Primary | ICD-10-CM

## 2022-01-13 PROCEDURE — 99213 OFFICE O/P EST LOW 20 MIN: CPT | Performed by: FAMILY MEDICINE

## 2022-01-13 RX ORDER — METHOCARBAMOL 750 MG/1
750 TABLET, FILM COATED ORAL 3 TIMES DAILY PRN
Qty: 30 TABLET | Refills: 0 | Status: SHIPPED | OUTPATIENT
Start: 2022-01-13

## 2022-01-13 NOTE — PROGRESS NOTES
COVID-19 Outpatient Progress Note    Assessment/Plan:    Problem List Items Addressed This Visit     None      Visit Diagnoses     COVID-19    -  Primary    Pt is doing well, & cleared from self-isolation  Continue mask-wearing in public, slowly return to activities, etc  Precautions given  Upper back pain        Mild strain - try OTC NSAIDs with food PRN, heat to the region, stretching, Methocarbamol PRN  Relevant Medications    methocarbamol (Robaxin-750) 750 mg tablet         Disposition:     Patient has COVID-19 infection  Based off CDC guidelines, they were recommended to isolate for 5 days from the date of the positive test  If they remain asymptomatic, isolation may be ended followed by 5 days of wearing a mask when around othes to minimize risk of infecting others  If they have a fever, continue to stay home until fever resolves for at least 24 hours  I have spent 15 minutes directly with the patient  Greater than 50% of this time was spent in counseling/coordination of care regarding: diagnostic results, prognosis, risks and benefits of treatment options, instructions for management, patient and family education, importance of treatment compliance, risk factor reductions and impressions  Encounter provider Tho Pepper DO    Provider located at [address] [id number]    Recent Visits  No visits were found meeting these conditions  Showing recent visits within past 7 days and meeting all other requirements  Today's Visits  Date Type Provider Dept   01/13/22 Telemedicine DO Ry Dominguez   Showing today's visits and meeting all other requirements  Future Appointments  No visits were found meeting these conditions  Showing future appointments within next 150 days and meeting all other requirements     This virtual check-in was done via telephone and he agrees to proceed      Patient agrees to participate in a virtual check in via telephone or video visit instead of presenting to the office to address urgent/immediate medical needs  Patient is aware this is a billable service  After connecting through Telephone, the patient was identified by name and date of birth  Monserrat Bustillo was informed that this was a telemedicine visit and that the exam was being conducted confidentially over secure lines  My office door was closed  No one else was in the room  Monserrat Bustillo acknowledged consent and understanding of privacy and security of the telemedicine visit  I informed the patient that I have reviewed his record in Epic and presented the opportunity for him to ask any questions regarding the visit today  The patient agreed to participate  It was my intent to perform this visit via video technology but the patient was not able to do a video connection so the visit was completed via audio telephone only  Verification of patient location:  Patient is located in the following state in which I hold an active license: [place]    Subjective:   Monserrat Bustillo is a 28 y o  male who has been screened for COVID-19  Symptom change since last report: improving  Patient's symptoms include nasal congestion and cough  Patient denies fever, sore throat, anosmia, loss of taste, shortness of breath, diarrhea and myalgias  - Date of symptom onset: 1/7/2022  - Date of positive COVID-19 PCR: 1/8/2022  Type of test: Home antigen    COVID-19 vaccination status: Fully vaccinated with booster    Edith Cotton has been staying home and has isolated themselves in his home  He is taking care to not share personal items and is cleaning all surfaces that are touched often, like counters, tabletops, and doorknobs using household cleaning sprays or wipes  He is wearing a mask when he leaves his room  Edith Cotton started to feel sick after receiving his Perales [employer] COV-19 booster last week  He is feeling better at this time      Has some mild HAYNES - improving  Has been walking with a limp for awhile after his right knee surgery - has led to some upper back / right back pain  No radicular pain to the right arm  Out of PT now        Lab Results   Component Value Date    SARSCOV2 NOT DETECTED 08/28/2021     Past Medical History:   Diagnosis Date    PONV (postoperative nausea and vomiting)     3/11/21 Pt states "deals with slight nausea after anesthesia "     Past Surgical History:   Procedure Laterality Date    ANKLE SURGERY Bilateral     CARPAL TUNNEL RELEASE Right     FL INJECTION RIGHT KNEE (ARTHROGRAM)  7/21/2021    KNEE ARTHROSCOPY Right     KNEE SURGERY Right     [place] KNEE SCOPE,MED OR LAT MENIS REPAIR Right 3/16/2021    Procedure: ARTHROSCOPIC LATERAL MENISCUS REPAIR;  Surgeon: Chapin Ibrahim DO;  Location: [address] [place] [address] MAIN OR;  Service: Orthopedics    [place] REVISE MEDIAN N/CARPAL TUNNEL SURG Right 9/20/2018    Procedure: RELEASE CARPAL TUNNEL;  Surgeon: Tommy Romero MD;  Location:  MAIN OR;  Service: Orthopedics    SYNOVECTOMY Right 9/20/2018    Procedure: TENOSYNOVECTOMY Flexor digitorum superficialis to index, long, ring and small finger; Flexor digitorum profundus to index, long, ring and small finger; Flexor pollicis longus;  Surgeon: Tommy Romero MD;  Location:  MAIN OR;  Service: Orthopedics    TONSILLECTOMY AND ADENOIDECTOMY       Current Outpatient Medications   Medication Sig Dispense Refill    acetaminophen (TYLENOL) 500 mg tablet Take 500 mg by mouth every 6 (six) hours as needed for mild pain      celecoxib (CeleBREX) 200 mg capsule Take 1 capsule (200 mg total) by mouth daily (Patient taking differently: Take 200 mg by mouth as needed for mild pain  ) 90 capsule 1    DAILY MULTIPLE VITAMINS PO Take by mouth daily       aspirin (ECOTRIN) 325 mg EC tablet Take 1 tablet (325 mg total) by mouth daily (Patient not taking: Reported on 8/16/2021) 30 tablet 0    methocarbamol (Robaxin-750) 750 mg tablet Take 1 tablet (750 mg total) by mouth 3 (three) times a day as needed for muscle spasms 30 tablet 0    ondansetron (ZOFRAN) 4 mg tablet Take 1 tablet (4 mg total) by mouth every 8 (eight) hours as needed for nausea or vomiting (Patient not taking: Reported on 8/16/2021) 20 tablet 0    oxyCODONE (ROXICODONE) 5 mg immediate release tablet Take 1 tablet (5 mg total) by mouth every 4 (four) hours as needed for moderate pain for up to 15 dosesMax Daily Amount: 30 mg (Patient not taking: Reported on 8/16/2021) 15 tablet 0     No current facility-administered medications for this visit  Allergies   Allergen Reactions    Ibuprofen Other (See Comments)     Mouth sores         Review of Systems   Constitutional: Negative for fever  HENT: Positive for congestion  Negative for sore throat  Respiratory: Positive for cough  Negative for shortness of breath  Gastrointestinal: Negative for diarrhea  Musculoskeletal: Negative for myalgias  Objective:    Vitals:    01/13/22 0859   Temp: (!) 97 4 °F (36 3 °C)   TempSrc: Oral   Weight: 92 1 kg (203 lb)   Height: 5' 11" (1 803 m)       Physical Exam  Vitals reviewed  Constitutional:       General: He is not in acute distress  Comments: Pt with some mild nasal congestion; he is in no acute distress, speaking in full sentences  Pulmonary:      Effort: Pulmonary effort is normal  No respiratory distress  Neurological:      Mental Status: He is alert and oriented to person, place, and time  Psychiatric:         Mood and Affect: Mood normal          Thought Content: Thought content normal          Judgment: Judgment normal           Tejas Lubin was seen today for back pain, covid-19 and covid-19  Diagnoses and all orders for this visit:    COVID-19  Comments:  Pt is doing well, & cleared from self-isolation  Continue mask-wearing in public, slowly return to activities, etc  Precautions given      Upper back pain  Comments:  Mild strain - try OTC NSAIDs with food PRN, heat to the region, stretching, Methocarbamol PRN  Orders:  -     methocarbamol (Robaxin-750) 750 mg tablet; Take 1 tablet (750 mg total) by mouth 3 (three) times a day as needed for muscle spasms          VIRTUAL VISIT DISCLAIMER    Olivia Leugn verbally agrees to participate in [employer]  Pt is aware that [employer] could be limited without vital signs or the ability to perform a full hands-on physical [place] understands he or the provider may request at any time to terminate the video visit and request the patient to seek care or treatment in person

## 2022-01-20 ENCOUNTER — EVALUATION (OUTPATIENT)
Dept: PHYSICAL THERAPY | Facility: CLINIC | Age: 32
End: 2022-01-20
Payer: OTHER MISCELLANEOUS

## 2022-01-20 ENCOUNTER — TELEPHONE (OUTPATIENT)
Dept: OBGYN CLINIC | Facility: OTHER | Age: 32
End: 2022-01-20

## 2022-01-20 DIAGNOSIS — S84.11XD INJURY OF RIGHT PERONEAL NERVE, SUBSEQUENT ENCOUNTER: Primary | ICD-10-CM

## 2022-01-20 PROCEDURE — 97161 PT EVAL LOW COMPLEX 20 MIN: CPT

## 2022-01-20 PROCEDURE — 97112 NEUROMUSCULAR REEDUCATION: CPT

## 2022-01-20 NOTE — PROGRESS NOTES
PT Evaluation     Today's date: 2022  Patient name: Sarika Briggs  : 1990  MRN: [id number]  Referring provider: Cade Watson DO  Dx:   Encounter Diagnosis     ICD-10-CM    1  Injury of right peroneal nerve, subsequent encounter  S84  11XD        Start Time: 0800  Stop Time: 0845  Total time in clinic (min): 45 minutes    Assessment  Assessment details: Sarika Briggs is a pleasant 28 y o  male who presents with chronic pain-limited R knee and ankle mobility and strength s/p R lateral mensicus arthroscopy and subsequent peroneal nerve release  The primary movement problem is pain resulting in poor R LE function and limiting his ability to carry, drive, exercise or recreation, go to work, lift, perform household chores, perform yard work, squat to  objects from the floor, stand and walk  Patient would benefit from further consultation with orthopedic surgery to consider effects of hardware and potential scar tissue influence on Valeriy's right peroneal nerve  I expect he will follow up with orthopedic surgery for further consultation  The patient's greatest concerns are the pain he is experiencing, worry over not knowing what's wrong and fear of not being able to keep active  Speedy Handing would benefit from further skilled PT consultation with and orthopaedic clinical specialist  I will refer him to our [facility] location at this time for additional skilled care  Problem List:  1) Pain-limited R knee and ankle mobility  2) Pain-limited R ankle strength  Impairments: activity intolerance, impaired physical strength, pain with function and poor body mechanics    Symptom irritability: moderateBarriers to therapy: None  Understanding of Dx/Px/POC: good   Prognosis: good  Prognosis details: Positive prognostic indicators include positive attitude toward recovery, good understanding of diagnosis and treatment plan options, absence of peripheralization and absence of observed red flags    Negative prognostic indicators include chronicity of symptoms, high symptom irritability, multiple concurrent orthopedic problems and poor surgical outcomes  Goals  ST weeks  Patient will report 1-2/10 NPRS reduction in ankle pain levels with strength and ROM assessment  LT weeks  Patient will be independent with home exercise program    Patient will be able to manage symptoms independently  Patient will demonstrate pain-free R ankle and knee ROM  Patient will demonstrate pain-free R ankle strength  Plan  Plan details: Patient will be referred to [facility] PT for OCS consultation regarding further nerve gliding and joint mobilization techniques  Patient would benefit from: skilled physical therapy  Referral necessary: Yes  Planned modality interventions: biofeedback  Planned therapy interventions: gait training, home exercise program, work reintegration, therapeutic exercise, therapeutic activities, patient education, neuromuscular re-education and manual therapy  Frequency: 2x week (1-2x/week PRN dependent on ortho consult )  Duration in weeks: 8  Plan of Care beginning date: 2022  Plan of Care expiration date: 3/31/2022  Treatment plan discussed with: patient        Subjective Evaluation    History of Present Illness  Date of surgery: 3/16/2021  Mechanism of injury: Presents to OP PT at [facility] for evaluation of ongoing chronic R knee/distal LE pain s/p R lateral meniscus arthroscopy performed nearly 9 months ago following mechanical work injury  Underwent PT for R knee rehab but continued to experience this pain in the distribution of the common peroneal nerve  Underwent subsequent decompression surgery in September with subsequent centralization of symptoms from down into his foot to at his ankle  Per consult with Dr Neftaly Fernandez, feels his hardware implanted at the area of his R knee surgical site may need to be removed   Pain is worsened by standing for more than 45 minutes or driving his car due to active ankle motion needed  Jhonathan November has been out of work as a  since his initial meniscal injury and wants to return as soon as possible  He has been trying medication for management (celebrex, tylenol,) but has not found improvements  Says at times it "feels like a beehive in my knee " Will be scheduling f/u visit with Dr Sridhar Marroquin following today's evaluation  Chart review of referring provider:    Ortho Sports Medicine Knee Follow Up Visit     Assesment:   28 y o  male 8 5 months s/p surgical arthroscopy of the right knee with lateral meniscus repair, DOS: 3/16/21, right peroneal nerve decompression 8/31/21 Dr Chong [facility]     Plan:     Conservative treatment:     We will refer to Neuro-PT to address some specific nerve related issues at this time     Imaging:     No imaging was available for review today      Injection:     No Injection planned at this time  Surgery:     He will discuss this with this wife and make a plan to follow up and discuss surgery      Advised that it seems most of his nerve related pain has resolved, and I do think that a diagnostic arthroscopy to address his lateral joint line pain would be the next best step  I explained in detail that it is possible that a anchor from his meniscus repair could have deployed into the popliteus and is rubbing and causing him pain   If this is the case, we would be able to surgically debride the area And remove the mechanical irritation from the inside of the knee or assess if there is any articular cartilage or meniscus issues that was not picked up on the prior MRI arthrogram        Follow up:     Return in about 6 weeks (around 1/10/2022) for Recheck         Chief Complaint  Patient presents with   Right Knee - Follow-up        History of Present Illness:     The patient is returns for follow up of  8 5 months s/p surgical arthroscopy of the right knee with lateral meniscus repair, DOS: 3/16/21, right peroneal nerve decompression 21 Dr Chong [facility]  Since the prior visit, He reports minimal improvement       He has pain that starts at lateral joint line and posterior joint line that extends down into the leg  He notes that his nerve related pain has decreased  He can only walk for 45 minutes without pain and then the pain lingers while he is at rest and sitting  His foot strength has come back mostly and he only has some pain with foot eversion                 Recurrent probem    Quality of life: fair    Pain  Current pain rating: 3  At best pain rating: 3  At worst pain ratin  Location: R knee lateral joint line shooting down to R lateral malleolus  Quality: needle-like, knife-like and sharp  Relieving factors: relaxation, rest, support and change in position  Aggravating factors: standing, walking, stair climbing and running (Active PF/DF with driving )  Progression: improved (Centralization from toes to ankle following decompression surgery )    Social Support  Steps to enter house: yes  Stairs in house: yes   Lives with: spouse    Employment status: working (Has not returned to work as / since meniscal injury 9 months ago )  Exercise history: Sports including football and baseball, physically demanding job  Treatments  Previous treatment: physical therapy (Surgery )  Current treatment: medication and physical therapy  Patient Goals  Patient goals for therapy: decreased pain, return to sport/leisure activities and return to work  Patient goal: "To get back to work as soon as possible "        Objective    L Hip AROM: WFL  L Knee AROM: WFL  L Ankle AROM: WFL    R Hip AROM: WFL  R Knee AROM: WFL, pain at end range of flexion in distribution as described in subjective  R Ankle AROM: WFL, pain rated 4-5/10 at end ranges of supination, inversion, and plantarflexion  Lesser pain rated 3/10 with dorsiflexion and eversion  PROM: greater pain in all ranges as above      L Patellar Reflex: 2+  L Achilles Reflex: 1+    R Patellar Reflex: 1+   R Achilles Reflex: 2+    L Ankle MMT: 5/5 all major muscle groups    R Ankle MMT: 3/5 PF (pain limited to WB PF test), 4+/5 supination, pronation, inversion, eversion, and dorsiflexion (all pain limited)         Short Term Goal Expiration Date:(2/20/22)  Long Term Goal Expiration Date: (3/31/22)  POC Expiration Date: (3/31/22)     Precautions: R knee scope performed 3/15/21, peroneal nerve release performed in [facility]       Manuals                                        Neuro Re-Ed         Pt Education Nerve anatomy, centralization vs peripheralization, peripheral nerve healing,  peroneal-specific nerve glides addressing specific symptomatic ranges of motion x10 min                                                       Ther Ex                                                                        Ther Activity                        Gait Training                        Modalities

## 2022-01-20 NOTE — TELEPHONE ENCOUNTER
Patient called in , he was offered a 10:45 today with you  He would like to know if it can be virtual visit today  I called over at the office and Dr Alesha Borges preferred patient come in for visit on 01-27      Patient was in agreement

## 2022-01-27 ENCOUNTER — OFFICE VISIT (OUTPATIENT)
Dept: OBGYN CLINIC | Facility: MEDICAL CENTER | Age: 32
End: 2022-01-27
Payer: OTHER MISCELLANEOUS

## 2022-01-27 VITALS
WEIGHT: 207.2 LBS | HEIGHT: 71 IN | SYSTOLIC BLOOD PRESSURE: 128 MMHG | BODY MASS INDEX: 29.01 KG/M2 | DIASTOLIC BLOOD PRESSURE: 82 MMHG

## 2022-01-27 DIAGNOSIS — Z98.890 S/P LATERAL MENISCUS REPAIR OF RIGHT KNEE: ICD-10-CM

## 2022-01-27 DIAGNOSIS — T84.84XA PAINFUL ORTHOPAEDIC HARDWARE (HCC): Primary | ICD-10-CM

## 2022-01-27 PROCEDURE — 99214 OFFICE O/P EST MOD 30 MIN: CPT | Performed by: ORTHOPAEDIC SURGERY

## 2022-01-27 RX ORDER — CEFAZOLIN SODIUM 2 G/50ML
2000 SOLUTION INTRAVENOUS ONCE
Status: CANCELLED | OUTPATIENT
Start: 2022-03-01 | End: 2022-01-27

## 2022-01-27 RX ORDER — TRAMADOL HYDROCHLORIDE 50 MG/1
50 TABLET ORAL EVERY 6 HOURS PRN
Status: CANCELLED | OUTPATIENT
Start: 2022-01-27

## 2022-01-27 RX ORDER — ACETAMINOPHEN 325 MG/1
650 TABLET ORAL EVERY 6 HOURS PRN
Status: CANCELLED | OUTPATIENT
Start: 2022-01-27

## 2022-01-27 NOTE — PROGRESS NOTES
Ortho Sports Medicine Knee Visit     Assesment:   28 y o  male 8 5 months s/p surgical arthroscopy of the right knee with lateral meniscus repair, DOS: 3/16/21, right peroneal nerve decompression 8/31/21 Estela Ly and still present but resolving nerve related symptoms, with continued lateral joint line pain and painful orthopedic hardware     Plan:    Conservative treatment:    Ice to knee for 20 minutes at least 1-2 times daily  OTC NSAIDS prn for pain  Post-op PT written  Patient has crutches     Imaging: All imaging from today was reviewed by myself and explained to the patient  Injection:    No Injection planned at this time  Surgery: All of the risks and benefits of operative treatment were explained to the patient, as well as the risks and benefits of any alternative treatment options, including nonoperative care  The risks of surgical treatement include, but are not limited to, infection, bleeding, blood clot, neurovascular damage, need for further surgery, continued pain, cardiovascular risk, and anesthesia risk  The patient understood this and elects to proceed forward with surgical intervention  We will proceed forward with surgical arthroscopy of the knee with possible lateral meniscus meniscectomy, with removal of orthopedic hardware and possible open lateral compartment exploration with arthrotomy  If there appears to be any entrapment of the  Popliteus tendon    Patient denies any changes in his medical history  Patient denies having any new allergies  Patient is allergic to ibuprofen  Patient denies a blood clot or having a bleeding or clotting disorder recently diagnosed  He denies any new medications or medical diagnosis is  Patient was diagnosed with COVID on 01/08/2022  We will have to delay surgery by 7 weeks  Follow-up: 1 week post-op      History of Present Illness: The patient is returns for follow up of right knee continued pain    Since the prior visit, He reports minimal improvement  Patient reports that the nerve pain he is experiencing from the scarring of the peroneal nerve has gotten slightly better  Patient reports that he used to have sharp shooting pain that shoots down into the foot that no longer occurs  He states that the pain now has shoots into the calf  Patient reports that he states that he feels to different types of pain within the leg  He states that he has sharp shooting pain that goes into the calf but he also has direct to lateral joint line pain as well  Pain is located lateral      Pain is improved by rest   Pain is aggravated by stairs, squatting, weight bearing, walking and pivoting on a planted foot  Symptoms include clicking and swelling  The patient has tried rest, ice, NSAIDS, physical therapy and injection  I have reviewed the past medical, surgical, social and family history, medications and allergies as documented in the EMR  Review of systems: ROS is negative other than that noted in the HPI  Constitutional: Negative for fatigue and fever     Cardiovascular: Negative for chest pain  Pulmonary: negative for shortness of breath    PMH/PSH:  Past Medical History:   Diagnosis Date    PONV (postoperative nausea and vomiting)     3/11/21 Pt states "deals with slight nausea after anesthesia "     Past Surgical History:   Procedure Laterality Date    ANKLE SURGERY Bilateral     CARPAL TUNNEL RELEASE Right     FL INJECTION RIGHT KNEE (ARTHROGRAM)  7/21/2021    KNEE ARTHROSCOPY Right     KNEE SURGERY Right     NH KNEE SCOPE,MED OR LAT MENIS REPAIR Right 3/16/2021    Procedure: ARTHROSCOPIC LATERAL MENISCUS REPAIR;  Surgeon: Jagruti Dobson DO;  Location: [address] [place] [address] MAIN OR;  Service: Orthopedics    NH REVISE MEDIAN N/CARPAL TUNNEL SURG Right 9/20/2018    Procedure: RELEASE CARPAL TUNNEL;  Surgeon: Apple Yuan MD;  Location:  MAIN OR;  Service: Orthopedics    SYNOVECTOMY Right 9/20/2018 Procedure: TENOSYNOVECTOMY Flexor digitorum superficialis to index, long, ring and small finger; Flexor digitorum profundus to index, long, ring and small finger; Flexor pollicis longus;  Surgeon: Meghan Elmore MD;  Location: [facility];  Service: Orthopedics    TONSILLECTOMY AND ADENOIDECTOMY          Physical Exam:    Blood pressure 128/82, height 5' 11" (1 803 m), weight 94 kg (207 lb 3 2 oz)  General/Constitutional: NAD, well developed, well nourished  HENT: Normocephalic, atraumatic  CV: Intact distal pulses, regular rate  Resp: No respiratory distress or labored breathing  Lymphatic: No lymphadenopathy palpated  Neuro: Alert and Oriented x 3, no focal deficits  Psych: Normal mood, normal affect, normal judgement, normal behavior  Skin: Warm, dry, no rashes, no erythema       Knee Exam (focused): RIGHT LEFT   ROM:   0-130 0-130   Palpation: Effusion negative negative     MJL tenderness Negative Negative     LJL tenderness Positive Negative   Instability: Varus stable stable     Valgus stable stable   Special Tests: Lachman Negative Negative     Posterior drawer Negative Negative     Anterior drawer Negative Negative     Pivot shift not tested not tested     Dial not tested not tested   Patella: Palpation no tenderness no tenderness     Mobility 1/4 1/4     Apprehension Negative Negative   Other: Single leg 1/4 squat not tested not tested      LE NV Exam: +2 DP/PT pulses bilaterally  Sensation intact to light touch L2-S1 bilaterally    No calf tenderness to palpation bilaterally      Knee Imaging    No new imaging

## 2022-01-27 NOTE — LETTER
January 27, 2022     Patient: José Cage   YOB: 1990   Date of Visit: 1/27/2022       To Whom it May Concern:    José Cage is under my professional care  He was seen in my office on 1/27/2022  He is unable to return to work at this time due to continued right knee pain and requiring additional right knee surgery  He will be seen back in the office 1 week after surgery in which his work status will be re-evaluated  If you have any questions or concerns, please don't hesitate to call           Sincerely,          Alivia Cabrera DO        CC: José Cage

## 2022-02-03 NOTE — PROGRESS NOTES
At this time Luisa Hewitt will be DC from care at [facility] as he has been transferred to [facility] following evaluation

## 2022-02-28 ENCOUNTER — ANESTHESIA EVENT (OUTPATIENT)
Dept: PERIOP | Facility: HOSPITAL | Age: 32
End: 2022-02-28
Payer: OTHER MISCELLANEOUS

## 2022-02-28 NOTE — PRE-PROCEDURE INSTRUCTIONS
Pre-Surgery Instructions:   Medication Instructions    acetaminophen (TYLENOL) 500 mg tablet Instructed patient per Anesthesia Guidelines   celecoxib (CeleBREX) 200 mg capsule Patient was instructed by Physician and understands   DAILY MULTIPLE VITAMINS PO Patient was instructed by Physician and understands   methocarbamol (Robaxin-750) 750 mg tablet Instructed patient per Anesthesia Guidelines  Pt  Verbalized understanding of current visitor restrictions  Pt  Verbalized an understanding of all instructions reviewed and offers no concerns at this time  Instructed to avoid all ASA/NSAIDs and OTC Vit/Supp from now until after surgery per anesthesia guidelines  Tylenol ok prn  Instructed to take as needed including DOS with sips water  Pre op and bathing instructions reviewed   Pt has hibiclens

## 2022-03-01 ENCOUNTER — HOSPITAL ENCOUNTER (OUTPATIENT)
Facility: HOSPITAL | Age: 32
Setting detail: OUTPATIENT SURGERY
Discharge: HOME | End: 2022-03-01
Attending: ORTHOPAEDIC SURGERY | Admitting: ORTHOPAEDIC SURGERY
Payer: OTHER MISCELLANEOUS

## 2022-03-01 ENCOUNTER — ANESTHESIA (OUTPATIENT)
Dept: PERIOP | Facility: HOSPITAL | Age: 32
End: 2022-03-01
Payer: OTHER MISCELLANEOUS

## 2022-03-01 VITALS
HEART RATE: 73 BPM | WEIGHT: 200 LBS | OXYGEN SATURATION: 98 % | DIASTOLIC BLOOD PRESSURE: 67 MMHG | TEMPERATURE: 97.4 F | RESPIRATION RATE: 20 BRPM | SYSTOLIC BLOOD PRESSURE: 117 MMHG | BODY MASS INDEX: 28 KG/M2 | HEIGHT: 71 IN

## 2022-03-01 DIAGNOSIS — Z98.890 S/P ARTHROSCOPIC PARTIAL LATERAL MENISCECTOMY: Primary | ICD-10-CM

## 2022-03-01 PROBLEM — K21.9 GASTROESOPHAGEAL REFLUX DISEASE: Status: ACTIVE | Noted: 2022-03-01

## 2022-03-01 PROCEDURE — 29874 ARTHRS KNEE SURG RMV LOOS/FB: CPT | Performed by: PHYSICIAN ASSISTANT

## 2022-03-01 PROCEDURE — 29874 ARTHRS KNEE SURG RMV LOOS/FB: CPT | Performed by: ORTHOPAEDIC SURGERY

## 2022-03-01 PROCEDURE — NC001 PR NO CHARGE: Performed by: ORTHOPAEDIC SURGERY

## 2022-03-01 RX ORDER — DEXAMETHASONE SODIUM PHOSPHATE 10 MG/ML
INJECTION, SOLUTION INTRAMUSCULAR; INTRAVENOUS AS NEEDED
Status: DISCONTINUED | OUTPATIENT
Start: 2022-03-01 | End: 2022-03-01

## 2022-03-01 RX ORDER — FENTANYL CITRATE/PF 50 MCG/ML
25 SYRINGE (ML) INJECTION
Status: COMPLETED | OUTPATIENT
Start: 2022-03-01 | End: 2022-03-01

## 2022-03-01 RX ORDER — OXYCODONE HYDROCHLORIDE 5 MG/1
5 TABLET ORAL EVERY 4 HOURS PRN
Qty: 15 TABLET | Refills: 0 | Status: SHIPPED | OUTPATIENT
Start: 2022-03-01 | End: 2022-03-07 | Stop reason: ALTCHOICE

## 2022-03-01 RX ORDER — ONDANSETRON 4 MG/1
4 TABLET, FILM COATED ORAL EVERY 8 HOURS PRN
Qty: 20 TABLET | Refills: 0 | Status: SHIPPED | OUTPATIENT
Start: 2022-03-01 | End: 2022-03-07 | Stop reason: ALTCHOICE

## 2022-03-01 RX ORDER — LIDOCAINE HYDROCHLORIDE 10 MG/ML
INJECTION, SOLUTION EPIDURAL; INFILTRATION; INTRACAUDAL; PERINEURAL AS NEEDED
Status: DISCONTINUED | OUTPATIENT
Start: 2022-03-01 | End: 2022-03-01

## 2022-03-01 RX ORDER — ONDANSETRON 2 MG/ML
4 INJECTION INTRAMUSCULAR; INTRAVENOUS ONCE AS NEEDED
Status: DISCONTINUED | OUTPATIENT
Start: 2022-03-01 | End: 2022-03-01 | Stop reason: HOSPADM

## 2022-03-01 RX ORDER — FENTANYL CITRATE 50 UG/ML
INJECTION, SOLUTION INTRAMUSCULAR; INTRAVENOUS AS NEEDED
Status: DISCONTINUED | OUTPATIENT
Start: 2022-03-01 | End: 2022-03-01

## 2022-03-01 RX ORDER — PROPOFOL 10 MG/ML
INJECTION, EMULSION INTRAVENOUS AS NEEDED
Status: DISCONTINUED | OUTPATIENT
Start: 2022-03-01 | End: 2022-03-01

## 2022-03-01 RX ORDER — ACETAMINOPHEN 325 MG/1
650 TABLET ORAL EVERY 6 HOURS PRN
Status: DISCONTINUED | OUTPATIENT
Start: 2022-03-01 | End: 2022-03-01 | Stop reason: HOSPADM

## 2022-03-01 RX ORDER — MIDAZOLAM HYDROCHLORIDE 2 MG/2ML
INJECTION, SOLUTION INTRAMUSCULAR; INTRAVENOUS AS NEEDED
Status: DISCONTINUED | OUTPATIENT
Start: 2022-03-01 | End: 2022-03-01

## 2022-03-01 RX ORDER — BUPIVACAINE HYDROCHLORIDE 2.5 MG/ML
INJECTION, SOLUTION EPIDURAL; INFILTRATION; INTRACAUDAL AS NEEDED
Status: DISCONTINUED | OUTPATIENT
Start: 2022-03-01 | End: 2022-03-01 | Stop reason: HOSPADM

## 2022-03-01 RX ORDER — HYDROMORPHONE HCL/PF 1 MG/ML
0.5 SYRINGE (ML) INJECTION
Status: DISCONTINUED | OUTPATIENT
Start: 2022-03-01 | End: 2022-03-01 | Stop reason: HOSPADM

## 2022-03-01 RX ORDER — SODIUM CHLORIDE, SODIUM LACTATE, POTASSIUM CHLORIDE, CALCIUM CHLORIDE 600; 310; 30; 20 MG/100ML; MG/100ML; MG/100ML; MG/100ML
50 INJECTION, SOLUTION INTRAVENOUS CONTINUOUS
Status: DISCONTINUED | OUTPATIENT
Start: 2022-03-01 | End: 2022-03-01 | Stop reason: HOSPADM

## 2022-03-01 RX ORDER — ONDANSETRON 2 MG/ML
INJECTION INTRAMUSCULAR; INTRAVENOUS AS NEEDED
Status: DISCONTINUED | OUTPATIENT
Start: 2022-03-01 | End: 2022-03-01

## 2022-03-01 RX ORDER — MAGNESIUM HYDROXIDE 1200 MG/15ML
LIQUID ORAL AS NEEDED
Status: DISCONTINUED | OUTPATIENT
Start: 2022-03-01 | End: 2022-03-01 | Stop reason: HOSPADM

## 2022-03-01 RX ORDER — CEFAZOLIN SODIUM 2 G/50ML
2000 SOLUTION INTRAVENOUS ONCE
Status: COMPLETED | OUTPATIENT
Start: 2022-03-01 | End: 2022-03-01

## 2022-03-01 RX ORDER — TRAMADOL HYDROCHLORIDE 50 MG/1
50 TABLET ORAL EVERY 6 HOURS PRN
Status: DISCONTINUED | OUTPATIENT
Start: 2022-03-01 | End: 2022-03-01 | Stop reason: HOSPADM

## 2022-03-01 RX ORDER — LIDOCAINE HYDROCHLORIDE AND EPINEPHRINE 5; 5 MG/ML; UG/ML
INJECTION, SOLUTION INFILTRATION; PERINEURAL AS NEEDED
Status: DISCONTINUED | OUTPATIENT
Start: 2022-03-01 | End: 2022-03-01 | Stop reason: HOSPADM

## 2022-03-01 RX ADMIN — HYDROMORPHONE HYDROCHLORIDE 0.5 MG: 1 INJECTION, SOLUTION INTRAMUSCULAR; INTRAVENOUS; SUBCUTANEOUS at 12:18

## 2022-03-01 RX ADMIN — LIDOCAINE HYDROCHLORIDE 50 MG: 10 INJECTION, SOLUTION EPIDURAL; INFILTRATION; INTRACAUDAL; PERINEURAL at 10:22

## 2022-03-01 RX ADMIN — PROPOFOL 200 MG: 10 INJECTION, EMULSION INTRAVENOUS at 10:22

## 2022-03-01 RX ADMIN — FENTANYL CITRATE 25 MCG: 50 INJECTION, SOLUTION INTRAMUSCULAR; INTRAVENOUS at 12:10

## 2022-03-01 RX ADMIN — ONDANSETRON 4 MG: 2 INJECTION INTRAMUSCULAR; INTRAVENOUS at 11:34

## 2022-03-01 RX ADMIN — FENTANYL CITRATE 25 MCG: 50 INJECTION, SOLUTION INTRAMUSCULAR; INTRAVENOUS at 11:27

## 2022-03-01 RX ADMIN — FENTANYL CITRATE 25 MCG: 50 INJECTION, SOLUTION INTRAMUSCULAR; INTRAVENOUS at 12:05

## 2022-03-01 RX ADMIN — FENTANYL CITRATE 25 MCG: 50 INJECTION, SOLUTION INTRAMUSCULAR; INTRAVENOUS at 10:53

## 2022-03-01 RX ADMIN — DEXAMETHASONE SODIUM PHOSPHATE 4 MG: 10 INJECTION, SOLUTION INTRAMUSCULAR; INTRAVENOUS at 10:35

## 2022-03-01 RX ADMIN — HYDROMORPHONE HYDROCHLORIDE 0.5 MG: 1 INJECTION, SOLUTION INTRAMUSCULAR; INTRAVENOUS; SUBCUTANEOUS at 12:28

## 2022-03-01 RX ADMIN — FENTANYL CITRATE 50 MCG: 50 INJECTION, SOLUTION INTRAMUSCULAR; INTRAVENOUS at 10:22

## 2022-03-01 RX ADMIN — CEFAZOLIN SODIUM 2000 MG: 2 SOLUTION INTRAVENOUS at 10:16

## 2022-03-01 RX ADMIN — FENTANYL CITRATE 25 MCG: 50 INJECTION, SOLUTION INTRAMUSCULAR; INTRAVENOUS at 12:00

## 2022-03-01 RX ADMIN — SODIUM CHLORIDE, SODIUM LACTATE, POTASSIUM CHLORIDE, AND CALCIUM CHLORIDE 50 ML/HR: .6; .31; .03; .02 INJECTION, SOLUTION INTRAVENOUS at 12:48

## 2022-03-01 RX ADMIN — SODIUM CHLORIDE, SODIUM LACTATE, POTASSIUM CHLORIDE, AND CALCIUM CHLORIDE: .6; .31; .03; .02 INJECTION, SOLUTION INTRAVENOUS at 10:16

## 2022-03-01 RX ADMIN — FENTANYL CITRATE 25 MCG: 50 INJECTION, SOLUTION INTRAMUSCULAR; INTRAVENOUS at 11:55

## 2022-03-01 RX ADMIN — MIDAZOLAM 2 MG: 1 INJECTION INTRAMUSCULAR; INTRAVENOUS at 10:16

## 2022-03-01 NOTE — ANESTHESIA POSTPROCEDURE EVALUATION
Post-Op Assessment Note    CV Status:  Stable    Pain management: adequate     Mental Status:  Alert and awake   Hydration Status:  Euvolemic   PONV Controlled:  Controlled   Airway Patency:  Patent      Post Op Vitals Reviewed: Yes      Staff: CRNA         No complications documented      /78 (03/01/22 1147)    Temp 97 7 °F (36 5 °C) (03/01/22 1147)    Pulse 80 (03/01/22 1147)   Resp 20 (03/01/22 1147)    SpO2 99 % (03/01/22 1147)

## 2022-03-01 NOTE — H&P
Ortho Sports Medicine Knee Visit     Assesment:   28 y o  male 8 5 months s/p surgical arthroscopy of the right knee with lateral meniscus repair, DOS: 3/16/21, right peroneal nerve decompression 8/31/21 Clydia Search and still present but resolving nerve related symptoms, with continued lateral joint line pain and painful orthopedic hardware     Plan:    Conservative treatment:    Ice to knee for 20 minutes at least 1-2 times daily  OTC NSAIDS prn for pain  Post-op PT written  Patient has crutches     Imaging: All imaging from today was reviewed by myself and explained to the patient  Injection:    No Injection planned at this time  Surgery: All of the risks and benefits of operative treatment were explained to the patient, as well as the risks and benefits of any alternative treatment options, including nonoperative care  The risks of surgical treatement include, but are not limited to, infection, bleeding, blood clot, neurovascular damage, need for further surgery, continued pain, cardiovascular risk, and anesthesia risk  The patient understood this and elects to proceed forward with surgical intervention  We will proceed forward with surgical arthroscopy of the knee with possible lateral meniscus meniscectomy, with removal of orthopedic hardware and possible open lateral compartment exploration with arthrotomy  If there appears to be any entrapment of the  Popliteus tendon    Patient denies any changes in his medical history  Patient denies having any new allergies  Patient is allergic to ibuprofen  Patient denies a blood clot or having a bleeding or clotting disorder recently diagnosed  He denies any new medications or medical diagnosis is  Patient was diagnosed with COVID on 01/08/2022  We will have to delay surgery by 7 weeks  Follow-up: 1 week post-op      History of Present Illness: The patient is returns for follow up of right knee continued pain    Since the prior visit, He reports minimal improvement  Patient reports that the nerve pain he is experiencing from the scarring of the peroneal nerve has gotten slightly better  Patient reports that he used to have sharp shooting pain that shoots down into the foot that no longer occurs  He states that the pain now has shoots into the calf  Patient reports that he states that he feels to different types of pain within the leg  He states that he has sharp shooting pain that goes into the calf but he also has direct to lateral joint line pain as well  Pain is located lateral      Pain is improved by rest   Pain is aggravated by stairs, squatting, weight bearing, walking and pivoting on a planted foot  Symptoms include clicking and swelling  The patient has tried rest, ice, NSAIDS, physical therapy and injection  I have reviewed the past medical, surgical, social and family history, medications and allergies as documented in the EMR  Review of systems: ROS is negative other than that noted in the HPI  Constitutional: Negative for fatigue and fever     Cardiovascular: Negative for chest pain  Pulmonary: negative for shortness of breath    PMH/PSH:  Past Medical History:   Diagnosis Date    COVID 01/09/2022    Fever,fatigue    GERD (gastroesophageal reflux disease)     rarely    PONV (postoperative nausea and vomiting)     3/11/21 Pt states "deals with slight nausea after anesthesia "     Past Surgical History:   Procedure Laterality Date    ANKLE SURGERY Bilateral     CARPAL TUNNEL RELEASE Right     FL INJECTION RIGHT KNEE (ARTHROGRAM)  7/21/2021    KNEE ARTHROSCOPY Right     KNEE SURGERY Right     ME KNEE SCOPE,MED OR LAT MENIS REPAIR Right 3/16/2021    Procedure: ARTHROSCOPIC LATERAL MENISCUS REPAIR;  Surgeon: Margarita Davies DO;  Location: [address] [place] [address];  Service: Orthopedics    ME REVISE MEDIAN N/CARPAL TUNNEL SURG Right 9/20/2018    Procedure: RELEASE CARPAL TUNNEL;  Surgeon: Zayra Garduno MD;  Location:  MAIN OR;  Service: Orthopedics    SYNOVECTOMY Right 9/20/2018    Procedure: TENOSYNOVECTOMY Flexor digitorum superficialis to index, long, ring and small finger; Flexor digitorum profundus to index, long, ring and small finger; Flexor pollicis longus;  Surgeon: Maria Alejandra Maldonado MD;  Location:  MAIN OR;  Service: Orthopedics    TONSILLECTOMY AND ADENOIDECTOMY          Physical Exam:    Blood pressure 118/70, pulse 68, temperature 97 9 °F (36 6 °C), resp  rate 18, height 5' 11" (1 803 m), weight 90 7 kg (200 lb), SpO2 98 %  General/Constitutional: NAD, well developed, well nourished  HENT: Normocephalic, atraumatic  CV: Intact distal pulses, regular rate  Resp: No respiratory distress or labored breathing  Lymphatic: No lymphadenopathy palpated  Neuro: Alert and Oriented x 3, no focal deficits  Psych: Normal mood, normal affect, normal judgement, normal behavior  Skin: Warm, dry, no rashes, no erythema       Knee Exam (focused): RIGHT LEFT   ROM:   0-130 0-130   Palpation: Effusion negative negative     MJL tenderness Negative Negative     LJL tenderness Positive Negative   Instability: Varus stable stable     Valgus stable stable   Special Tests: Lachman Negative Negative     Posterior drawer Negative Negative     Anterior drawer Negative Negative     Pivot shift not tested not tested     Dial not tested not tested   Patella: Palpation no tenderness no tenderness     Mobility 1/4 1/4     Apprehension Negative Negative   Other: Single leg 1/4 squat not tested not tested      LE NV Exam: +2 DP/PT pulses bilaterally  Sensation intact to light touch L2-S1 bilaterally    No calf tenderness to palpation bilaterally      Knee Imaging    No new imaging

## 2022-03-01 NOTE — ANESTHESIA PREPROCEDURE EVALUATION
Procedure:  ARTHROSCOPY OF KNEE WITH POSSIBLE LATERAL MENISCUS MENISCECTOMY (Right Knee)  WITH REMOVAL OF HARDWARE- (Right Knee)  WITH POSSIBLE OPEN LATERAL EXPLORATION/ARTHROTOMY (Right Knee)    Relevant Problems   ANESTHESIA   (+) PONV (postoperative nausea and vomiting)      GI/HEPATIC   (+) Gastroesophageal reflux disease      Other   (+) Common peroneal neuropathy, right   (+) Cyclic citrullinated peptide (CCP) antibody positive   (+) Right peroneal nerve injury        Physical Exam    Airway    Mallampati score: II  TM Distance: >3 FB  Neck ROM: full     Dental       Cardiovascular  Cardiovascular exam normal    Pulmonary  Pulmonary exam normal     Other Findings        Anesthesia Plan  ASA Score- 2     Anesthesia Type- general with ASA Monitors  Additional Monitors:   Airway Plan: ETT and LMA  Plan Factors-Exercise tolerance (METS): >4 METS  Chart reviewed  Existing labs reviewed  Patient summary reviewed  Patient is not a current smoker  Patient not instructed to abstain from smoking on day of procedure  Patient did not smoke on day of surgery  Induction- intravenous  Postoperative Plan- Plan for postoperative opioid use  Informed Consent- Anesthetic plan and risks discussed with patient and spouse  I personally reviewed this patient with the CRNA  Discussed and agreed on the Anesthesia Plan with the CRNA             Lab Results   Component Value Date    HGBA1C 5 0 09/12/2021       Lab Results   Component Value Date    K 4 3 09/21/2018     09/21/2018    CO2 29 09/21/2018    BUN 16 09/21/2018    CREATININE 0 92 09/21/2018    GLUF 83 09/21/2018    CALCIUM 8 8 09/21/2018    AST 15 09/21/2018    ALT 23 09/21/2018    ALKPHOS 63 09/21/2018    EGFR 114 09/21/2018       Lab Results   Component Value Date    WBC 7 46 09/21/2018    HGB 13 7 09/21/2018    HCT 41 9 09/21/2018    MCV 90 09/21/2018     09/21/2018

## 2022-03-01 NOTE — DISCHARGE INSTRUCTIONS
POSTOPERATIVE INSTRUCTIONS following KNEE SURGERY    MEDICATIONS:  · Resume all home medications unless otherwise instructed by your surgeon  · Pain Medication:  Oxycodone 5 mg, 1 tablet every 4 hours as needed  · If you were given a regional anesthetic (nerve block), please begin taking the pain medication as soon as you get home, even if you have minimal or no pain  DO NOT WAIT FOR THE NERVE BLOCK TO WEAR OFF  · Possible side effects include nausea, constipation, and urinary retention  If you experience these side effects, please call our office for assistance  · Pain med refills are authorized only during office hours (8am-4pm, Mon-Fri)  · Anti-Inflammatory:  Tylenol 325 mg, 1-2 tablets every 6 hours for 4 weeks  · Take with food  Stop if you experience nausea, reflux, or stomach pain  · Nausea Medication:  Zofran ODT 4 mg, 1 tablet every 6 hours as needed  · Fill prescription ONLY if you expericnce severe nausea  · Blood Clot Prevention:   · Pump your foot up and down 20 times per hour while you are less mobile  WOUND CARE:  · Keep the dressing clean and dry  Light drainage may occur the first 2 days postop  · You may remove the dressings and get the incision wet in the shower 72 hours after surgery  DO NOT remove steri-strips or sutures  DO NOT immerse the incision under water  Carefully pat the incision dry  If there is wound drainage, re-apply a fresh dry gauze dressing  · Please call our office ([phone]) if you experience either of the following:  · Sudden increase in swelling, redness, or warmth at the surgical site  · Excessive incisional drainage that persists beyond the 3rd day after surgery  · Oral temperature greater than 101 degrees, not relieved with Tylenol  · Shortness of breath, chest pain, nausea, or any other concerning symptoms    SWELLING CONTROL:  · Cold Therapy: The cold therapy device may be used either continuously or only as needed, according to your preference  Do not let the pad directly touch your skin  Alternatively, apply ice (20 min on, 20 min off) as often as you feel is necessary  · Elevation:  Elevate the entire leg above heart level  Place pillows under your ankle to keep your knee straight  · Compression:  Apply ACE wraps or a thigh-length compression stocking as needed  RANGE OF MOTION:  · You are allowed FULL RANGE OF MOTION as tolerated  IMMOBILIZATION:  · None  You are allowed full range of motion as tolerated  ACTIVITY:   · BEAR FULL WEIGHT AS TOLERATED on the operative leg  Use crutches to assist only as needed  · Using Crutches on Stairs:  Going up, lead with your "good" (nonoperative) leg  Going down, lead with your "bad" (operative) leg  Use a hand rail when available  · Knee Extension:  Place a rolled towel or pillow under your ankle for 20-30 minutes 3-5 times per day  This will help to maintain full knee extension  · Quad Sets:  Sit or lie with your knee straight  Tighten your quadriceps (front thigh) muscle  Hold for 3 seconds, then relax  Repeat 20 times per hour while awake  PHYSICAL THERAPY:  · Begin therapy 3 TO 5 DAYS AFTER SURGERY  You were given a prescription for therapy at your preoperative office visit  If you do not have physical therapy scheduled yet, please call our office for assistance  FOLLOW-UP APPOINTMENT:  · 7-10 days after surgery with:    [facility]  [address] [place] [address], [address] [place] [address], [facility], [address] [facility]  [phone] ([facility])  [phone] (After-Hours)

## 2022-03-01 NOTE — OP NOTE
OPERATIVE REPORT  PATIENT NAME: Gomez Metzger    :  1990  MRN: [id number]  Pt Location:  OR ROOM 12    SURGERY DATE: 3/1/2022    Surgeon(s) and Role:     * Kyle Perdue DO - Primary     * Feng Boone PA-C - Assisting    Preop Diagnosis:  S/P lateral meniscus repair of right knee [Z98 890]  Painful orthopaedic hardware ([address] ) Carolynn Lard    Post-Op Diagnosis Codes:     * S/P lateral meniscus repair of right knee [Z98 890]     * Painful orthopaedic hardware ([address] ) Carolynn Lard    Procedure(s) (LRB):  RIGHT KNEE ARTHROSCOPY, ARTHROSCOPIC REMOVAL OF HARDWARE (Right)    Specimen(s):  * No specimens in log *    Estimated Blood Loss:   Minimal    Drains:  * No LDAs found *    Anesthesia Type:   General w/ Regional    Operative Indications:  S/P lateral meniscus repair of right knee [Z98 890]  Painful orthopaedic hardware ([address] ) [[id number] [id number]]    Complications:   None    Procedure and Technique:      Operation:  Surgical arthroscopy of the Right knee with arthroscopic removal of hardware        Anesthesia:  general     Tourniquet Time: 35 min     Blood Loss:  Minimal      Indications: Mr Enzo Eli is a 28 y o  male with lateral joint pain 10 months s/p lateral meniscus repair  An MRI was obtained a revealed a healed lateral meniscus   Due to the patient's MRI findings, active lifestyle, and lack of improvement with a conservative approach, it was recommended that they proceed forward with arthroscopic surgical management of this problem  We reviewed risks and benefits of surgery and a decision was made to proceed with surgery to address the possible prominent hardware versus possible lateral meniscus tear  Findings:       Examination under anesthesia of the operative Right knee revealed a range of motion of 0-130 degrees  Posterior drawer testing was negative  Lachman testing was negative   Pivot shift testing was negative,  Collateral ligament stability testing revealed no laxity with valgus or varus stresses  With respect to posterolateral corner testing, dial testing at 30 and at 90 degrees was symmetric to the contralateral knee  Arthroscopic evaluation of the knee revealed the following:     Medial meniscus: No Tears   Medial femoral condyle:Grade III chondral defects  Medial tibial plateau: Grade  0 chondral defects  Anterior cruciate ligament: Normal appearance  Posterior cruciate ligament: Normal appearance  Lateral meniscus: There was a complex, multidirectional tear of the lateral meniscus     Lateral femoral condyle: Grade II chondral defects  Lateral tibial plateau: GradeI chondral defects     Medial and lateral gutters: No loose bodies  Patella: Grade II chondral defects  Trochlea: Grade 0 chondral defects  Medial plica: No significant plica was present             Procedure:  In the pre-operative holding area, the patient identified the correct operative extremity and I marked that extremity with my initials, using a permanent marker  The patient was brought to the operating room and positioned supine  Following satisfactory induction of anesthesia, the Right knee was prepped and draped in the usual sterile fashion for surgical arthroscopy of the Right knee  Before any surgical instrumentation was passed to me by the surgical technician, a formalized time-out occurred, which involves the surgeon, circulating nurse, and anesthesia staff all verifying the correct operative extremity  My initials were visible on the prepped and draped operative field  The anatomic landmarks of the anteromedial and anterolateral portals were marked and these portal sites were injected with 1% lidocaine with epinephrine  Subsequently, 40 mL of 1% lidocaine with epinephrine was injected into the knee through a superolateral puncture  The anterolateral portal was established with a scalpel   The arthroscope was introduced through this portal  Under direct visualization, the anteromedial portal was established with a localizing needle followed by a scalpel  A probe was then introduced into the anteromedial portal  A systematic diagnostic arthroscopy evaluated the following:  medial compartment, notch, lateral compartment, patellofemoral compartment, medial gutter, and lateral gutter  There was no retear of the lateral mensicus  The prior repair was fully intact and healed  There was a prominent suture below the lateral meniscus, which was removed and led to an anchor seen posterior to the meniscus that was prominent as well, and was involving a small portion of the popliteus tendon  This anchor was removed with an arthroscopic grasper, completing the removal of hardware  The meniscus and popliteus as well as popliteal hiatus and lateral gutter were explored and no further hardware or scar tissue was seen impinging  The meniscus had a normal amount of mobility  This completed the removal of hardware  There was no additional pathology  All particulate debris was removed  The knee was copiously rinsed and then drained  The portals were closed with an interrupted 4-0 monocryl absorbable suture  The skin was cleansed with sterile saline and dried before Steri-Strips were applied  Finally, a sterile dressing was secured by Webril and an Ace wrap        I was present for the entire procedure, A qualified resident physician was not available and A physician assistant was required during the procedure for retraction tissue handling,dissection and suturing    Patient Disposition:  PACU       SIGNATURE: Tesfaye Aiken DO  DATE: March 1, 2022  TIME: 11:36 AM

## 2022-03-07 ENCOUNTER — OFFICE VISIT (OUTPATIENT)
Dept: OBGYN CLINIC | Facility: MEDICAL CENTER | Age: 32
End: 2022-03-07

## 2022-03-07 VITALS
DIASTOLIC BLOOD PRESSURE: 84 MMHG | SYSTOLIC BLOOD PRESSURE: 124 MMHG | TEMPERATURE: 98.3 F | WEIGHT: 200 LBS | HEART RATE: 98 BPM | HEIGHT: 71 IN | BODY MASS INDEX: 28 KG/M2

## 2022-03-07 DIAGNOSIS — Z98.890 S/P ARTHROSCOPIC SURGERY OF RIGHT KNEE: Primary | ICD-10-CM

## 2022-03-07 PROCEDURE — 99024 POSTOP FOLLOW-UP VISIT: CPT | Performed by: ORTHOPAEDIC SURGERY

## 2022-03-07 NOTE — PROGRESS NOTES
Knee Post Operative Visit     Assesment:     28 y o  male 1 week s/p surgical arthroscopy of the right knee with removal of lateral mensicus repair suture and anchor with lysis of adhesions of popliteal hiatus, DOS: 3/1/22    Plan:    Post-Operative treatment:    Ice to knee for 20 minutes at least 1-2 times daily  PT for ROM/strengthening to knee, hip and core  OTC NSAIDS prn for pain  Work note written    Imaging: All imaging from today was reviewed by myself and explained to the patient  Weight bearing:  as tolerated     ROM:  Full    Brace:  No brace needed    DVT Prophylaxis:  Ambulation    Follow up:   6 weeks     Patient was advised that if they have any fevers, chills, chest pain, shortness of breath, redness or drainage from the incision, please let our office know immediately  Chief Complaint   Patient presents with    Right Knee - Post-op       History of Present Illness: The patient is a 28 y o  male who is being evaluated post operatively 1 week  status post surgical arthroscopy of the right knee with removal of lateral mensicus repair suture with lysis of adhesions of popliteal hiatus, DOS: 3/1/22  Since the prior visit, He reports improvement  He believes he is heading in the right direction in terms of resolving his knee pain  He reports improvement of lateral joint line pain, reports about 60% improvement of pain  He also feels like his knee is gliding freely now  He doesn't feel like something is 'binding up' his knee in the posterior lateral aspect  Pain is well controlled  The patient is using ice to control swelling  They have not started physical therapy  The patient Ambulation for DVT ppx  The patient has been ambulating without crutches  The patient has been ambulating without a brace  The patient denies any fevers, chills, calf pain, chest pain/shortness of breath, redness or drainage from the incision           I have reviewed the past medical, surgical, social and family history, medications and allergies as documented in the EMR  Review of systems: ROS is negative other than that noted in the HPI  Constitutional: Negative for fatigue and fever  Physical Exam:    Blood pressure 124/84, pulse 98, temperature 98 3 °F (36 8 °C), height 5' 11" (1 803 m), weight 90 7 kg (200 lb)  General/Constitutional: NAD, well developed, well nourished  HENT: Normocephalic, atraumatic  CV: Intact distal pulses, regular rate  Resp: No respiratory distress or labored breathing  Lymphatic: No lymphadenopathy palpated  Neuro: Alert and Oriented x 3, no focal deficits  Psych: Normal mood, normal affect, normal judgement, normal behavior  Skin: Warm, dry, no rashes, no erythema      Knee Exam (focused):                   RIGHT LEFT   ROM:   0-130 0-130   Palpation: Effusion minimal negative     MJL tenderness Negative Negative     LJL tenderness Positive mild Negative   Instability: Varus stable stable     Valgus stable stable   Special Tests: Lachman Negative Negative     Posterior drawer Negative Negative     Anterior drawer Negative Negative     Pivot shift not tested not tested     Dial not tested not tested   Patella: Palpation no tenderness no tenderness     Mobility 1/4 1/4     Apprehension Negative Negative   Other: Single leg 1/4 squat not tested not tested      Incisions show no erythema, no drainage    LE NV Exam: +2 DP/PT pulses bilaterally  Sensation intact to light touch L2-S1 bilaterally     Bilateral hip ROM demonstrates no pain actively or passively    No calf tenderness to palpation bilaterally

## 2022-03-07 NOTE — LETTER
March 7, 2022     Patient: Loida Christine   YOB: 1990   Date of Visit: 3/7/2022       To Whom it May Concern:    Loida Christine is under my professional care  He was seen in my office on 3/7/2022  He is unable to return to work at this time due to recent right knee surgery on 3/1/22  He will be seen back in the office in 6 weeks in which his work status will be re-evaluated  If you have any questions or concerns, please don't hesitate to call           Sincerely,          Carlos Enrique Huggins,         CC: Loida Christine

## 2022-03-08 ENCOUNTER — EVALUATION (OUTPATIENT)
Dept: PHYSICAL THERAPY | Facility: REHABILITATION | Age: 32
End: 2022-03-08
Payer: OTHER MISCELLANEOUS

## 2022-03-08 DIAGNOSIS — Z98.890 S/P LATERAL MENISCUS REPAIR OF RIGHT KNEE: ICD-10-CM

## 2022-03-08 DIAGNOSIS — T84.84XA PAINFUL ORTHOPAEDIC HARDWARE (HCC): ICD-10-CM

## 2022-03-08 DIAGNOSIS — M25.561 ACUTE PAIN OF RIGHT KNEE: Primary | ICD-10-CM

## 2022-03-08 PROCEDURE — 97161 PT EVAL LOW COMPLEX 20 MIN: CPT | Performed by: PHYSICAL THERAPIST

## 2022-03-08 PROCEDURE — 97140 MANUAL THERAPY 1/> REGIONS: CPT | Performed by: PHYSICAL THERAPIST

## 2022-03-08 NOTE — PROGRESS NOTES
PT Evaluation     Today's date: 3/8/2022  Patient name: Johnny Maldonado  : 1990  MRN: [id number]  Referring provider: Mayra Campos,*  Dx:   Encounter Diagnosis     ICD-10-CM    1  Acute pain of right knee  M25 [id number]    2  S/P lateral meniscus repair of right knee  Z98 890 Ambulatory Referral to Physical Therapy   3  Painful orthopaedic hardware [facility])  [id number] Ambulatory Referral to Physical Therapy                  Assessment  Assessment details: Johnny Maldonado is a pleasant 28 y o  male who presents to physical therapy following arthroscopic knee surgery     The primary movement problem is mobility deficit into closed chain knee flexion, resulting in pathoanatomical symptoms of acute on chronic knee pain and limiting his ability to exercise or recreation, get off the toilet, get out of a chair, go to work, perform yard work, squat to  objects from the floor, and walk  The patient has had 3 knee surgeries in the past year, initially a meniscal repair lateral, followed by common fib nerve decompression, and most recently a meniscal debridement  He presented today with residual scar tissue, nerve irritation from surgeries, and pain with weight bearing flexion  He does demonstrate good quad activity, normal knee ROM, with minimal loss of knee hyperextension, and ambulates without an assistive device  No further referral appears necessary at this time based upon examination results  The patient would benefit from skilled physical therapy to address his current deficits, improve his quality of life, and restore his PLOF  Problem List:  1) mobility deficit into closed chain knee flexion  2) quad muscle neuro inhibition    Etiologic factors include recent surgery    Impairments: abnormal coordination, abnormal gait, abnormal muscle firing, abnormal muscle tone, abnormal or restricted ROM, abnormal movement, activity intolerance, impaired balance, impaired physical strength, lacks appropriate home exercise program, pain with function, safety issue, weight-bearing intolerance, poor posture  and poor body mechanics    Symptom irritability: moderateUnderstanding of Dx/Px/POC: good   Prognosis: good  Prognosis details: Positive prognostic indicators include positive attitude toward recovery and good understanding of diagnosis and treatment plan options  Negative prognostic indicators include chronicity of symptoms, multiple prior failed treatments and poor surgical outcomes  Goals  Impairment Based Goals:   Patient will improve FOTO score greater than predicted increase in 4 weeks  Patient will improve strength by at least 1/2 grade in 4 weeks  Patient will be able to squat to at least 90 degrees with 2/10 or less knee pain in 6 weeks  Patient will restore normalized gait pattern by discharge  Functional Based Goals: To be met upon discharge  Patient will be independent with home exercise program    Patient will be able to manage symptoms independently  Patient will be able to return to riding his exercise bike and outdoor bike  Patient will be able to kneel on and off the floor  Plan  Patient would benefit from: skilled physical therapy  Referral necessary: No  Planned therapy interventions: home exercise program, graded exercise, graded motor, graded activity, gait training, functional ROM exercises, flexibility, body mechanics training, balance/weight bearing training, balance, abdominal trunk stabilization, joint mobilization, manual therapy, patient education, postural training, self care, strengthening, stretching, therapeutic activities and therapeutic exercise  Plan of Care beginning date: 3/8/2022  Plan of Care expiration date: 5/3/2022  Treatment plan discussed with: patient        Subjective Evaluation    History of Present Illness  Date of surgery: 3/1/2022  Mechanism of injury: surgery  Mechanism of injury: Have had 3 surgeries in the past year   First had a meniscus repair, but then started having nerve compression, so then I had a nerve decompression after that  The first meniscus repair was March, and the nerve decompression was around the end of August  I then went back to rehab after that  The nerve pain was still there, but some what better with the rehab  I was still having a lot of pain on the outside of the knee and the joint pain  Eventually the physician told me stop rehab around new years, and then I had another consult with him  I had popliteal muscle entrapment that was released and also some clean up in my joint surgery last week  I feel that my knee moves more freely now  I have been taking some small walks and some of the exercises they gave me     Pain  Current pain ratin  At best pain ratin  At worst pain ratin  Quality: dull ache, burning, sharp, tight and radiating  Relieving factors: rest, relaxation and change in position  Aggravating factors: walking, stair climbing, sitting and standing    Social Support    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, 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 less demanding physically )  Treatments  Previous treatment: physical therapy (3 surgeries )  Current treatment: medication and physical therapy  Patient Goals  Patient goals for therapy: decreased edema, decreased pain, improved balance, increased motion, increased strength, independence with ADLs/IADLs, return to sport/leisure activities and return to work  Patient goal: I would like to return to riding my bike, to be able to kneel and squat, return to cardio         Objective     General Comments:      Knee Comments  Knee AROM:   WNL flexion   Extension: -2 degrees     Knee PROM:   WNL flexion and extension     Squat: Pain at 45 degrees; could not squat beyond, minor weight shift to left lower extremity, forward trunk lean     SLS: Poor     Sensation: diminished at right lateral knee near common fibular nerve     Scar Tissues: present at posterolateral right knee from recent surgeries; residual fibrotic tissue with increased resistance at distal lateral hamstring, posterior knee, as well as superior, lateral gastrocnemius     Passive SLR: Positive   Neural Tension:   Tibial: (+)   Common Fib: (+)     Ely's: Negative     Quad Set: Good     Patellar Mobility: Good     Gait: Antalgic, poor knee flexion through swing and terminal knee extension in midstance     Strength:    Hip Abduction: 4/5 B   Hip Extension Knee Extended: 4/5 B   Hip Extension Knee Flexed: 4/5 B   Knee Extension: R 4+/5   L 5/5   Knee Flexion: R 4+/5   L 5/5   Plantarflexion: R 4/5   L 5/5                  Precautions: stats post arthroscopic knee surgery R lateral meniscus debridement; history of chronic ankle instability left; history of right common fibular nerve decompression      Manuals 3/8            STM/ART R distal lateral HS; superolateral gastroc SE 10'                                                    Neuro Re-Ed             Active SLR  HEP            Bridges             Clamshell             Total Gym Sidelying             Leg Press             RDL             KB Squat             Ther Ex             Upright Bike             Modified Side Plank             LAQ             SideSteps             Pallof             Step Up             Lateral Step Down                          Ther Activity             KB DL                           Gait Training             Alter G                          Modalities

## 2022-03-10 ENCOUNTER — OFFICE VISIT (OUTPATIENT)
Dept: PHYSICAL THERAPY | Facility: REHABILITATION | Age: 32
End: 2022-03-10
Payer: OTHER MISCELLANEOUS

## 2022-03-10 DIAGNOSIS — M25.561 ACUTE PAIN OF RIGHT KNEE: ICD-10-CM

## 2022-03-10 DIAGNOSIS — Z98.890 S/P LATERAL MENISCUS REPAIR OF RIGHT KNEE: Primary | ICD-10-CM

## 2022-03-10 DIAGNOSIS — T84.84XA PAINFUL ORTHOPAEDIC HARDWARE (HCC): ICD-10-CM

## 2022-03-10 PROCEDURE — 97530 THERAPEUTIC ACTIVITIES: CPT

## 2022-03-10 PROCEDURE — 97110 THERAPEUTIC EXERCISES: CPT

## 2022-03-10 PROCEDURE — 97112 NEUROMUSCULAR REEDUCATION: CPT

## 2022-03-10 NOTE — PROGRESS NOTES
Daily Note     Today's date: 3/10/2022  Patient name: Nadja Rosario  : 1990  MRN: [id number]  Referring provider: El Moran  Dx:   Encounter Diagnosis     ICD-10-CM    1  S/P lateral meniscus repair of right knee  Z98 890    2  Acute pain of right knee  M25 561    3  Painful orthopaedic hardware [facility])  T84 84XA                   Subjective: Pt reports no new complaints for today's session  He reports performing his HEP with good tolerance  Objective: See treatment diary below      Assessment: Tolerated treatment well  Pt able to tolerate all strengthening exercises with only c/o "ache" during increased knee flexion  Pt demonstrates greatest difficulties with eccentric loading, and becomes easily fatigued  VC during mini squats for hip hinge with good response and carry over to deadlifts  Educated pt on potential for DOMS following first tx  Pt verbalizes understanding  Monitor response at f/u  Patient would benefit from continued PT  1:1 with Mac Reeves DPT for entirety of tx  Plan: Continue per plan of care        Precautions: stats post arthroscopic knee surgery R lateral meniscus debridement; history of chronic ankle instability left; history of right common fibular nerve decompression      Manuals 3/8 3/10           STM/ART R distal lateral HS; superolateral gastroc SE 10'  reviewed                                                  Neuro Re-Ed             Active SLR  HEP x10           Bridges  2x10           Clamshell  2x10           Total Gym Sidelying  2x10 <90 deg flexion           Leg Press  2x8 70#            RDL             KB Squat  2x8 15#           Mini squats  2x10           Ther Ex             Upright Bike  5' L1           Modified Side Plank             LAQ  2x10           SideSteps  x6 laps slight knee flexion           Pallof             Step Up  2x8 4"           Lateral Step Down  2x4 4"                        Ther Activity             KB DL   2x8 15# Gait Training             Alter G                          Modalities

## 2022-03-14 ENCOUNTER — OFFICE VISIT (OUTPATIENT)
Dept: PHYSICAL THERAPY | Facility: REHABILITATION | Age: 32
End: 2022-03-14
Payer: OTHER MISCELLANEOUS

## 2022-03-14 DIAGNOSIS — M25.561 ACUTE PAIN OF RIGHT KNEE: Primary | ICD-10-CM

## 2022-03-14 DIAGNOSIS — Z98.890 S/P LATERAL MENISCUS REPAIR OF RIGHT KNEE: ICD-10-CM

## 2022-03-14 PROCEDURE — 97110 THERAPEUTIC EXERCISES: CPT | Performed by: PHYSICAL THERAPIST

## 2022-03-14 PROCEDURE — 97112 NEUROMUSCULAR REEDUCATION: CPT | Performed by: PHYSICAL THERAPIST

## 2022-03-14 PROCEDURE — 97140 MANUAL THERAPY 1/> REGIONS: CPT | Performed by: PHYSICAL THERAPIST

## 2022-03-14 NOTE — PROGRESS NOTES
Daily Note     Today's date: 3/14/2022  Patient name: Kofi Breaux  : 1990  MRN: [id number]  Referring provider: Jorgito Francois  Dx:   Encounter Diagnosis     ICD-10-CM    1  Acute pain of right knee  M25 561    2  S/P lateral meniscus repair of right knee  Z98 890                   Subjective: Knee is doing pretty well  It was a tad sore after last session but with some ice and rest it resolved  Objective: See treatment diary below      Assessment: Tolerated treatment well  Patient would benefit from continued PT      Plan: Continue per plan of care        Precautions: stats post arthroscopic knee surgery R lateral meniscus debridement; history of chronic ankle instability left; history of right common fibular nerve decompression      Manuals 3/8 3/10 3/14          STM/ART R distal lateral HS; superolateral gastroc SE 10'  reviewed           R knee PROM    SE 5'           Tib-Fem Post Mob   SE Gr IV 3'                        Neuro Re-Ed             Active SLR  HEP x10 HEP          Bridges  2x10 HEP          Clamshell  2x10 HEP          Total Gym Sidelying  2x10 <90 deg flexion 3x10          Leg Press  2x8 70#  3x10 70#, (last set 85#)          RDL             KB Squat  2x8 15# 3x8 15#          Mini squats  2x10 3x10 [place] sq 20# kesier          Ther Ex             Upright Bike  5' L1 10' L2          Modified Side Plank             LAQ  2x10 3x10 gtb          SideSteps  x6 laps slight knee flexion x6 laps slight knee flexion gtb          Pallof             Step Up  2x8 4" 3x8 6"          Lateral Step Down  2x4 4" 3x8 4"                        Ther Activity             KB DL   2x8 15# 3x8 15#                       Gait Training             Alter G                          Modalities

## 2022-03-16 ENCOUNTER — OFFICE VISIT (OUTPATIENT)
Dept: PHYSICAL THERAPY | Facility: REHABILITATION | Age: 32
End: 2022-03-16
Payer: OTHER MISCELLANEOUS

## 2022-03-16 DIAGNOSIS — T84.84XA PAINFUL ORTHOPAEDIC HARDWARE (HCC): ICD-10-CM

## 2022-03-16 DIAGNOSIS — M25.561 ACUTE PAIN OF RIGHT KNEE: Primary | ICD-10-CM

## 2022-03-16 DIAGNOSIS — Z98.890 S/P LATERAL MENISCUS REPAIR OF RIGHT KNEE: ICD-10-CM

## 2022-03-16 PROCEDURE — 97112 NEUROMUSCULAR REEDUCATION: CPT | Performed by: PHYSICAL THERAPIST

## 2022-03-16 PROCEDURE — 97140 MANUAL THERAPY 1/> REGIONS: CPT | Performed by: PHYSICAL THERAPIST

## 2022-03-16 PROCEDURE — 97110 THERAPEUTIC EXERCISES: CPT | Performed by: PHYSICAL THERAPIST

## 2022-03-16 NOTE — PROGRESS NOTES
Daily Note     Today's date: 3/16/2022  Patient name: Monserrat Bustillo  : 1990  MRN: [id number]  Referring provider: Kwame Lozoya,*  Dx:   Encounter Diagnosis     ICD-10-CM    1  Acute pain of right knee  M25 561    2  S/P lateral meniscus repair of right knee  Z98 890    3  Painful orthopaedic hardware [facility])  T84 84XA                   Subjective: Patient without c/o prior to treatment session  Objective: See treatment diary below      Assessment: Patient performed exercises without c/o pain; mild pain with end range knee flexion PROM  Plan: Continue per plan of care        Precautions: stats post arthroscopic knee surgery R lateral meniscus debridement; history of chronic ankle instability left; history of right common fibular nerve decompression      Manuals 3/8 3/10 3/14 3/16         STM/ART R distal lateral HS; superolateral gastroc SE 10'  reviewed           R knee PROM    SE 5'  KK         Tib-Fem Post Mob   SE Gr IV 3'  KK                      Neuro Re-Ed             Active SLR  HEP x10 HEP          Bridges  2x10 HEP          Clamshell  2x10 HEP          Total Gym Sidelying  2x10 <90 deg flexion 3x10 3x10         Leg Press  2x8 70#  3x10 70#, (last set 85#) 3x10 70#, (last set 85#)         RDL             KB Squat  2x8 15# 3x8 15# 3x8 15#         Mini squats  2x10 3x10 Vietnamese sq 20# kesier 3x10 Vietnamese sq 20# kesier         Ther Ex             Upright Bike  5' L1 10' L2 10' L2         Modified Side Plank             LAQ  2x10 3x10 gtb np         SideSteps  x6 laps slight knee flexion x6 laps slight knee flexion gtb x6 laps slight knee flexion gtb         Pallof             Step Up  2x8 4" 3x8 6" 3x8 6"         Lateral Step Down  2x4 4" 3x8 4"  3x8 4"                       Ther Activity             KB DL   2x8 15# 3x8 15# 3x8 15#                      Gait Training             Alter G                          Modalities

## 2022-03-17 ENCOUNTER — APPOINTMENT (OUTPATIENT)
Dept: PHYSICAL THERAPY | Facility: REHABILITATION | Age: 32
End: 2022-03-17
Payer: OTHER MISCELLANEOUS

## 2022-03-21 ENCOUNTER — OFFICE VISIT (OUTPATIENT)
Dept: PHYSICAL THERAPY | Facility: REHABILITATION | Age: 32
End: 2022-03-21
Payer: OTHER MISCELLANEOUS

## 2022-03-21 DIAGNOSIS — Z98.890 S/P LATERAL MENISCUS REPAIR OF RIGHT KNEE: ICD-10-CM

## 2022-03-21 DIAGNOSIS — M25.561 ACUTE PAIN OF RIGHT KNEE: Primary | ICD-10-CM

## 2022-03-21 PROCEDURE — 97110 THERAPEUTIC EXERCISES: CPT | Performed by: PHYSICAL THERAPIST

## 2022-03-21 PROCEDURE — 97112 NEUROMUSCULAR REEDUCATION: CPT | Performed by: PHYSICAL THERAPIST

## 2022-03-21 NOTE — PROGRESS NOTES
Daily Note     Today's date: 3/21/2022  Patient name: Brigette Luna  : 1990  MRN: [id number]  Referring provider: Castro July,*  Dx:   Encounter Diagnosis     ICD-10-CM    1  Acute pain of right knee  M25 561    2  S/P lateral meniscus repair of right knee  Z98 890                   Subjective: was on my feet for 2 straight days over the weekend  By Saturday night it was sore, but by today I am feeling better  Objective: See treatment diary below      Assessment: Tolerated treatment well  Patient would benefit from continued PT      Plan: Continue per plan of care        Precautions: stats post arthroscopic knee surgery R lateral meniscus debridement; history of chronic ankle instability left; history of right common fibular nerve decompression      Manuals 3/8 3/10 3/14 3/16 3/21        STM/ART R distal lateral HS; superolateral gastroc SE 10'  reviewed           R knee PROM    SE 5'  KK         Tib-Fem Post Mob   SE Gr IV 3'  KK                      Neuro Re-Ed             Active SLR  HEP x10 HEP          Bridges  2x10 HEP          Clamshell  2x10 HEP          Total Gym Sidelying  2x10 <90 deg flexion 3x10 3x10 2x15 B        Leg Press  2x8 70#  3x10 70#, (last set 85#) 3x10 70#, (last set 85#) 3x10 100#        RDL             KB Squat  2x8 15# 3x8 15# 3x8 15# 3x8 20#        Mini squats  2x10 3x10 English sq 20# kesier 3x10 English sq 20# kesier 3x10 ss 20# tacho        Ther Ex             Upright Bike  5' L1 10' L2 10' L2 10' L2        Lateral Step Up      3x10 15# DBs        LAQ  2x10 3x10 gtb np         SideSteps  x6 laps slight knee flexion x6 laps slight knee flexion gtb x6 laps slight knee flexion gtb 6 laps btb        Pallof             Step Up  2x8 4" 3x8 6" 3x8 6" 3x8 9" 15# DBs         Lateral Step Down  2x4 4" 3x8 4"  3x8 4"  3x8 6"                     Ther Activity             KB DL   2x8 15# 3x8 15# 3x8 15# 3x8 20#                     Gait Training             Alter G Modalities

## 2022-03-24 ENCOUNTER — APPOINTMENT (OUTPATIENT)
Dept: PHYSICAL THERAPY | Facility: REHABILITATION | Age: 32
End: 2022-03-24
Payer: OTHER MISCELLANEOUS

## 2022-03-29 ENCOUNTER — APPOINTMENT (OUTPATIENT)
Dept: PHYSICAL THERAPY | Facility: REHABILITATION | Age: 32
End: 2022-03-29
Payer: OTHER MISCELLANEOUS

## 2022-03-30 ENCOUNTER — OFFICE VISIT (OUTPATIENT)
Dept: PHYSICAL THERAPY | Facility: REHABILITATION | Age: 32
End: 2022-03-30
Payer: OTHER MISCELLANEOUS

## 2022-03-30 DIAGNOSIS — M25.561 ACUTE PAIN OF RIGHT KNEE: Primary | ICD-10-CM

## 2022-03-30 DIAGNOSIS — T84.84XA PAINFUL ORTHOPAEDIC HARDWARE (HCC): ICD-10-CM

## 2022-03-30 DIAGNOSIS — Z98.890 S/P LATERAL MENISCUS REPAIR OF RIGHT KNEE: ICD-10-CM

## 2022-03-30 PROCEDURE — 97112 NEUROMUSCULAR REEDUCATION: CPT

## 2022-03-30 PROCEDURE — 97110 THERAPEUTIC EXERCISES: CPT

## 2022-03-30 NOTE — PROGRESS NOTES
Daily Note     Today's date: 3/30/2022  Patient name: Vannessa Donaldson  : 1990  MRN: [id number]  Referring provider: Herber Caputo,*  Dx:   Encounter Diagnosis     ICD-10-CM    1  Acute pain of right knee  M25 561    2  S/P lateral meniscus repair of right knee  Z98 890    3  Painful orthopaedic hardware [facility])  T84 84XA                   Subjective: Pt reported low back discomfort due to picking his son up  Pt noted back > knee pain today  Objective: See treatment diary below      Assessment: Tolerated treatment well  Patient demonstrated fatigue post treatment and exhibited good technique with therapeutic exercises  VC's needed for correct technique throughout session  Fatigue noted post session  Plan: Continue per plan of care        Precautions: stats post arthroscopic knee surgery R lateral meniscus debridement; history of chronic ankle instability left; history of right common fibular nerve decompression      Manuals 3/8 3/10 3/14 3/16 3/21 3/30       STM/ART R distal lateral HS; superolateral gastroc SE 10'  reviewed           R knee PROM    SE 5'  KK         Tib-Fem Post Mob   SE Gr IV 3'  KK                      Neuro Re-Ed             Active SLR  HEP x10 HEP          Bridges  2x10 HEP          Clamshell  2x10 HEP          Total Gym Sidelying  2x10 <90 deg flexion 3x10 3x10 2x15 B 2x15 B       Leg Press  2x8 70#  3x10 70#, (last set 85#) 3x10 70#, (last set 85#) 3x10 100# 3x10 100#       RDL             KB Squat  2x8 15# 3x8 15# 3x8 15# 3x8 20# 3x8 20#       Mini squats  2x10 3x10 French sq 20# kesier 3x10 French sq 20# kesier 3x10 ss 20# tacho 3x10 SS 20# tacho       Ther Ex             Upright Bike  5' L1 10' L2 10' L2 10' L2 10' L2       Lateral Step Up      3x10 15# DBs 3x10 15# DBs       LAQ  2x10 3x10 gtb np         SideSteps  x6 laps slight knee flexion x6 laps slight knee flexion gtb x6 laps slight knee flexion gtb 6 laps btb 6 laps btb       Pallof             Step Up 2x8 4" 3x8 6" 3x8 6" 3x8 9" 15# DBs  3x8 9" 15# DBs       Lateral Step Down  2x4 4" 3x8 4"  3x8 4"  3x8 6" 6" 3x8                    Ther Activity             KB DL   2x8 15# 3x8 15# 3x8 15# 3x8 20# 3x8 20#                    Gait Training             Alter G                          Modalities

## 2022-03-31 ENCOUNTER — OFFICE VISIT (OUTPATIENT)
Dept: PHYSICAL THERAPY | Facility: REHABILITATION | Age: 32
End: 2022-03-31
Payer: OTHER MISCELLANEOUS

## 2022-03-31 DIAGNOSIS — M25.561 ACUTE PAIN OF RIGHT KNEE: Primary | ICD-10-CM

## 2022-03-31 DIAGNOSIS — Z98.890 S/P LATERAL MENISCUS REPAIR OF RIGHT KNEE: ICD-10-CM

## 2022-03-31 PROCEDURE — 97112 NEUROMUSCULAR REEDUCATION: CPT | Performed by: PHYSICAL THERAPIST

## 2022-03-31 PROCEDURE — 97110 THERAPEUTIC EXERCISES: CPT | Performed by: PHYSICAL THERAPIST

## 2022-03-31 NOTE — PROGRESS NOTES
Daily Note     Today's date: 3/31/2022  Patient name: Saundra Ellis  : 1990  MRN: [id number]  Referring provider: Bibi Penny,*  Dx:   Encounter Diagnosis     ICD-10-CM    1  Acute pain of right knee  M25 561    2  S/P lateral meniscus repair of right knee  Z98 890                   Subjective: Pretty sore in the muscles from yesterday's treatment, but knee is great  Objective: See treatment diary below      Assessment: Tolerated treatment well  Regressed load today as patient had visit yesterday and had DOMS from treatment  Will resume level of exercise next week  Patient would benefit from continued PT      Plan: Continue per plan of care        Precautions: stats post arthroscopic knee surgery R lateral meniscus debridement; history of chronic ankle instability left; history of right common fibular nerve decompression      Manuals 3/8 3/10 3/14 3/16 3/21 3/30 3/31      STM/ART R distal lateral HS; superolateral gastroc SE 10'  reviewed           R knee PROM    SE 5'  KK   SE 5'      Tib-Fem Post Mob   SE Gr IV 3'  KK   SE 5' Gr IV                   Neuro Re-Ed             Active SLR  HEP x10 HEP          Bridges  2x10 HEP          Clamshell  2x10 HEP          Total Gym Sidelying  2x10 <90 deg flexion 3x10 3x10 2x15 B 2x15 B 3x12 B      Leg Press  2x8 70#  3x10 70#, (last set 85#) 3x10 70#, (last set 85#) 3x10 100# 3x10 100# Resume nv      RDL             KB Squat  2x8 15# 3x8 15# 3x8 15# 3x8 20# 3x8 20# Tempo 4-2      Mini squats  2x10 3x10 [place] sq 20# kesier 3x10 [place] sq 20# kesier 3x10 ss 20# tacho 3x10 SS 20# tacho Resume nv      Ther Ex             Upright Bike  5' L1 10' L2 10' L2 10' L2 10' L2 10' L2      Lateral Step Up      3x10 15# DBs 3x10 15# DBs 3x10 no weight      LAQ  2x10 3x10 gtb np         SideSteps  x6 laps slight knee flexion x6 laps slight knee flexion gtb x6 laps slight knee flexion gtb 6 laps btb 6 laps btb 6 laps btb      Pallof             Step Up  2x8 4" 3x8 6" 3x8 6" 3x8 9" 15# DBs  3x8 9" 15# DBs 3x8 9" 15# DBs      Lateral Step Down  2x4 4" 3x8 4"  3x8 4"  3x8 6" 6" 3x8 6" 3x8                    Ther Activity             KB DL   2x8 15# 3x8 15# 3x8 15# 3x8 20# 3x8 20# 3x8 20#                   Gait Training             Alter G                          Modalities

## 2022-04-04 ENCOUNTER — EVALUATION (OUTPATIENT)
Dept: PHYSICAL THERAPY | Facility: REHABILITATION | Age: 32
End: 2022-04-04
Payer: OTHER MISCELLANEOUS

## 2022-04-04 DIAGNOSIS — M25.561 ACUTE PAIN OF RIGHT KNEE: Primary | ICD-10-CM

## 2022-04-04 DIAGNOSIS — Z98.890 S/P LATERAL MENISCUS REPAIR OF RIGHT KNEE: ICD-10-CM

## 2022-04-04 DIAGNOSIS — T84.84XA PAINFUL ORTHOPAEDIC HARDWARE (HCC): ICD-10-CM

## 2022-04-04 PROCEDURE — 97112 NEUROMUSCULAR REEDUCATION: CPT

## 2022-04-04 PROCEDURE — 97110 THERAPEUTIC EXERCISES: CPT

## 2022-04-04 NOTE — PROGRESS NOTES
Daily Note     Today's date: 2022  Patient name: Debbie Meier  : 1990  MRN: [id number]  Referring provider: Mehul Hung,*  Dx:   Encounter Diagnosis     ICD-10-CM    1  Acute pain of right knee  M25 561    2  S/P lateral meniscus repair of right knee  Z98 890    3  Painful orthopaedic hardware [facility])  T84 84XA                   Subjective: patient stated that his knee is feeling good  No new changes since LV  Objective: See treatment diary below      Assessment: Tolerated treatment well  Patient demonstrated good power, control, and balance  Minimal cuing required to correct squat form, with good carryover  Generalized muscle fatigue noted with all tasks, more so with resisted squats  Continued PT would be beneficial to improve function           Plan: Continue per plan of care         Precautions: stats post arthroscopic knee surgery R lateral meniscus debridement; history of chronic ankle instability left; history of right common fibular nerve decompression      Manuals 3/8 3/10 3/14 3/16 3/21 3/30 3/31 4     STM/ART R distal lateral HS; superolateral gastroc SE 10'  reviewed           R knee PROM    SE 5'  KK   SE 5'      Tib-Fem Post Mob   SE Gr IV 3'  KK   SE 5' Gr IV                   Neuro Re-Ed             Active SLR  HEP x10 HEP          Bridges  2x10 HEP          Clamshell  2x10 HEP          Total Gym Sidelying  2x10 <90 deg flexion 3x10 3x10 2x15 B 2x15 B 3x12 B 3x12 B     Leg Press  2x8 70#  3x10 70#, (last set 85#) 3x10 70#, (last set 85#) 3x10 100# 3x10 100# Resume nv 3x10 100#     RDL             KB Squat  2x8 15# 3x8 15# 3x8 15# 3x8 20# 3x8 20# Tempo 4-2 3x8 20#     Mini squats  2x10 3x10 Lao sq 20# kesier 3x10 Lao sq 20# kesier 3x10 ss 20# tacho 3x10 SS 20# tacho Resume nv 3x10 SS 20# tacho     Ther Ex             Upright Bike  5' L1 10' L2 10' L2 10' L2 10' L2 10' L2 10' L2     Lateral Step Up      3x10 15# DBs 3x10 15# DBs 3x10 no weight 3x10 15# DBs LAQ  2x10 3x10 gtb np         SideSteps  x6 laps slight knee flexion x6 laps slight knee flexion gtb x6 laps slight knee flexion gtb 6 laps btb 6 laps btb 6 laps btb 6 laps btb     Pallof             Step Up  2x8 4" 3x8 6" 3x8 6" 3x8 9" 15# DBs  3x8 9" 15# DBs 3x8 9" 15# DBs 3x8 9" 15# DBs     Lateral Step Down  2x4 4" 3x8 4"  3x8 4"  3x8 6" 6" 3x8 6" 3x8  6" 3x8                   Ther Activity             KB DL   2x8 15# 3x8 15# 3x8 15# 3x8 20# 3x8 20# 3x8 20# 3x8 20#                  Gait Training             Alter G                          Modalities

## 2022-04-07 ENCOUNTER — OFFICE VISIT (OUTPATIENT)
Dept: PHYSICAL THERAPY | Facility: REHABILITATION | Age: 32
End: 2022-04-07
Payer: OTHER MISCELLANEOUS

## 2022-04-07 DIAGNOSIS — M25.561 ACUTE PAIN OF RIGHT KNEE: ICD-10-CM

## 2022-04-07 DIAGNOSIS — T84.84XA PAINFUL ORTHOPAEDIC HARDWARE (HCC): ICD-10-CM

## 2022-04-07 DIAGNOSIS — Z98.890 S/P LATERAL MENISCUS REPAIR OF RIGHT KNEE: Primary | ICD-10-CM

## 2022-04-07 PROCEDURE — 97110 THERAPEUTIC EXERCISES: CPT | Performed by: PHYSICAL THERAPIST

## 2022-04-07 PROCEDURE — 97530 THERAPEUTIC ACTIVITIES: CPT | Performed by: PHYSICAL THERAPIST

## 2022-04-07 PROCEDURE — 97112 NEUROMUSCULAR REEDUCATION: CPT | Performed by: PHYSICAL THERAPIST

## 2022-04-07 NOTE — PROGRESS NOTES
Daily Note     Today's date: 2022  Patient name: Miky Viramontes  : 1990  MRN: [id number]  Referring provider: Mai Lazar,*  Dx:   Encounter Diagnosis     ICD-10-CM    1  S/P lateral meniscus repair of right knee  Z98 890    2  Acute pain of right knee  M25 561    3  Painful orthopaedic hardware ([address] )  T84 84XA        Start Time: 1030  Stop Time: 1115  Total time in clinic (min): 45 minutes    Subjective: Progressing well, pain well controlled  Objective: See treatment diary below      Assessment: Tolerated treatment well  Patient would benefit from continued PT 1:1 with Florinda Ramesh DPT for entirety of tx  Plan: Continue per plan of care        Precautions: stats post arthroscopic knee surgery R lateral meniscus debridement; history of chronic ankle instability left; history of right common fibular nerve decompression      Manuals 3/21 3/30 3/31 4/4 4/7   STM/ART R distal lateral HS; superolateral gastroc        R knee PROM    SE 5'     Tib-Fem Post Mob   SE 5' Gr IV             Neuro Re-Ed        Active SLR         Bridges        Clamshell        Total Gym Sidelying 2x15 B 2x15 B 3x12 B 3x12 B 3x12 B   Leg Press 3x10 100# 3x10 100# Resume nv 3x10 100# 3x10 100#   RDL        KB Squat 3x8 20# 3x8 20# Tempo 4-2 3x8 20# 3x8 25#   Mini squats 3x10 ss 20# tacho 3x10 SS 20# tacho Resume nv 3x10 SS 20# tacho    Ther Ex        Upright Bike 10' L2 10' L2 10' L2 10' L2 10' L2   Lateral Step Up  3x10 15# DBs 3x10 15# DBs 3x10 no weight 3x10 15# DBs 3x10 15# DBs   LAQ        SideSteps 6 laps btb 6 laps btb 6 laps btb 6 laps btb 6 laps mtb   Pallof        Step Up 3x8 9" 15# DBs  3x8 9" 15# DBs 3x8 9" 15# DBs 3x8 9" 15# DBs 3x8 9" 15# DBs   Lateral Step Down 3x8 6" 6" 3x8 6" 3x8  6" 3x8  6" 3x8            Ther Activity        KB DL  3x8 20# 3x8 20# 3x8 20# 3x8 20# 3x8 25#           Gait Training        Alter G                Modalities

## 2022-04-11 ENCOUNTER — OFFICE VISIT (OUTPATIENT)
Dept: OBGYN CLINIC | Facility: MEDICAL CENTER | Age: 32
End: 2022-04-11

## 2022-04-11 VITALS
DIASTOLIC BLOOD PRESSURE: 83 MMHG | SYSTOLIC BLOOD PRESSURE: 136 MMHG | WEIGHT: 200 LBS | HEIGHT: 71 IN | BODY MASS INDEX: 28 KG/M2

## 2022-04-11 DIAGNOSIS — Z98.890 S/P ARTHROSCOPIC SURGERY OF RIGHT KNEE: Primary | ICD-10-CM

## 2022-04-11 PROCEDURE — 99024 POSTOP FOLLOW-UP VISIT: CPT | Performed by: ORTHOPAEDIC SURGERY

## 2022-04-11 NOTE — PROGRESS NOTES
Knee Post Operative Visit     Assesment:     28 y o  male 6 weeks s/p surgical arthroscopy of the right knee with removal of lateral mensicus repair suture and anchor with lysis of adhesions of popliteal hiatus, DOS: 3/1/22, with continued improvement     Plan:    Post-Operative treatment:    Ice to knee for 20 minutes at least 1-2 times daily  PT for ROM/strengthening to knee, hip and core  OTC NSAIDS prn for pain  Let pain guide gradual return activities  Work note written stating Bert Cheema may return to work  Cleared for all activities at this point  Imaging:    No imaging was available for review today  Weight bearing:  as tolerated     ROM:  Full    Brace:  No brace needed    DVT Prophylaxis:  Ambulation    Follow up:   3 months    Patient was advised that if they have any fevers, chills, chest pain, shortness of breath, redness or drainage from the incision, please let our office know immediately  Chief Complaint   Patient presents with    Right Knee - Post-op       History of Present Illness: The patient is a 28 y o  male who is being evaluated post operatively 6 weeks s/p surgical arthroscopy of the right knee with removal of lateral mensicus repair suture and anchor with lysis of adhesions of popliteal hiatus, DOS: 3/1/22  Since the prior visit, He reports significant improvement, and believes he is doing even better than the last visit  Pain is well controlled  The patient is using ice to control swelling  He only experiences lateral pain with deep squatting  They have started physical therapy  The patient Ambulation for DVT ppx  The patient has been ambulating without crutches  The patient has been ambulating without a brace  The patient denies any fevers, chills, calf pain, chest pain/shortness of breath, redness or drainage from the incision           I have reviewed the past medical, surgical, social and family history, medications and allergies as documented in the EMR  Review of systems: ROS is negative other than that noted in the HPI  Constitutional: Negative for fatigue and fever  Physical Exam:    Blood pressure 136/83, height 5' 11" (1 803 m), weight 90 7 kg (200 lb)  General/Constitutional: NAD, well developed, well nourished  HENT: Normocephalic, atraumatic  CV: Intact distal pulses, regular rate  Resp: No respiratory distress or labored breathing  Lymphatic: No lymphadenopathy palpated  Neuro: Alert and Oriented x 3, no focal deficits  Psych: Normal mood, normal affect, normal judgement, normal behavior  Skin: Warm, dry, no rashes, no erythema      Knee Exam (focused):                   RIGHT LEFT   ROM:   0-130 0-130   Palpation: Effusion negative negative     MJL tenderness Negative Negative     LJL tenderness Negative Negative   Instability: Varus stable stable     Valgus stable stable   Special Tests: Lachman Negative Negative     Posterior drawer Negative Negative     Anterior drawer Negative Negative     Pivot shift not tested not tested     Dial not tested not tested   Patella: Palpation no tenderness no tenderness     Mobility 1/4 1/4     Apprehension Negative Negative   Other: Single leg 1/4 squat not tested not tested      Incisions show no erythema, no drainage    LE NV Exam: +2 DP/PT pulses bilaterally  Sensation intact to light touch L2-S1 bilaterally     Bilateral hip ROM demonstrates no pain actively or passively    No calf tenderness to palpation bilaterally    Scribe Attestation    I,:  Ladonna Bee am acting as a scribe while in the presence of the attending physician :       I,:  Zainab Perdue DO personally performed the services described in this documentation    as scribed in my presence :

## 2022-04-11 NOTE — LETTER
April 11, 2022     Patient: Attila Srivastava   YOB: 1990   Date of Visit: 4/11/2022       To Whom it May Concern:    Attila Srivastava is under my professional care  He was seen in my office on 4/11/2022  He may return to work on 4/18/2022 with no restrictions  If you have any questions or concerns, please don't hesitate to call           Sincerely,          Geoff Mckeon DO        CC: Davidtootie Srivastava

## 2022-07-22 ENCOUNTER — NURSE TRIAGE (OUTPATIENT)
Dept: OTHER | Facility: OTHER | Age: 32
End: 2022-07-22

## 2022-07-22 NOTE — TELEPHONE ENCOUNTER
Regarding: Blood in stool  ----- Message from Delia Mosley sent at 7/22/2022 11:32 AM EDT -----  "I had a stomach bug on Sunday and Monday, and now my morning bowel movements have blood in it "

## 2022-07-22 NOTE — TELEPHONE ENCOUNTER
Patient calling in with mild rectal bleeding due to increased BMs from stomach bug earlier in the week  The patient states he sees a few drops of blood in toilet after he goes and then has a small amount when he wipes  He has some slight abdominal pain but denies other symptoms  Care advice given and patient scheduled for an appt on Monday at 2:30  Informed patient to call back with any worsening symptoms  Reason for Disposition   MILD rectal bleeding (more than just a few drops or streaks)    Answer Assessment - Initial Assessment Questions  1  APPEARANCE of BLOOD: "What color is it?" "Is it passed separately, on the surface of the stool, or mixed in with the stool?"       Bright red     2  AMOUNT: "How much blood was passed?"       Several drops in toilet and then when wiping has some on toilet paper     3  FREQUENCY: "How many times has blood been passed with the stools?"       Every morning since Monday     4  ONSET: "When was the blood first seen in the stools?" (Days or weeks)       Tuesday morning     5  DIARRHEA: "Is there also some diarrhea?" If Yes, ask: "How many diarrhea stools were passed in past 24 hours?"       Denies     6  CONSTIPATION: "Do you have constipation?" If Yes, ask: "How bad is it?"      Denies     7  RECURRENT SYMPTOMS: "Have you had blood in your stools before?" If Yes, ask: "When was the last time?" and "What happened that time?"       Never had this problem in the past     8  BLOOD THINNERS: "Do you take any blood thinners?" (e g , Coumadin/warfarin, Pradaxa/dabigatran, aspirin)      No     9  OTHER SYMPTOMS: "Do you have any other symptoms?"  (e g , abdominal pain, vomiting, dizziness, fever)      Some abdominal pain      Protocols used: RECTAL BLEEDING-ADULT-[place]

## 2022-07-25 ENCOUNTER — APPOINTMENT (OUTPATIENT)
Dept: LAB | Facility: CLINIC | Age: 32
End: 2022-07-25
Payer: COMMERCIAL

## 2022-07-25 ENCOUNTER — OFFICE VISIT (OUTPATIENT)
Dept: FAMILY MEDICINE CLINIC | Facility: CLINIC | Age: 32
End: 2022-07-25
Payer: COMMERCIAL

## 2022-07-25 VITALS
BODY MASS INDEX: 27.35 KG/M2 | HEART RATE: 79 BPM | DIASTOLIC BLOOD PRESSURE: 86 MMHG | WEIGHT: 195.4 LBS | HEIGHT: 71 IN | OXYGEN SATURATION: 100 % | TEMPERATURE: 98.2 F | RESPIRATION RATE: 14 BRPM | SYSTOLIC BLOOD PRESSURE: 122 MMHG

## 2022-07-25 DIAGNOSIS — K52.9 GASTROENTERITIS: ICD-10-CM

## 2022-07-25 DIAGNOSIS — K62.5 BRBPR (BRIGHT RED BLOOD PER RECTUM): Primary | ICD-10-CM

## 2022-07-25 DIAGNOSIS — K64.8 INTERNAL HEMORRHOIDS: ICD-10-CM

## 2022-07-25 DIAGNOSIS — K62.5 BRBPR (BRIGHT RED BLOOD PER RECTUM): ICD-10-CM

## 2022-07-25 LAB
ANION GAP SERPL CALCULATED.3IONS-SCNC: 6 MMOL/L (ref 4–13)
BASOPHILS # BLD AUTO: 0.05 THOUSANDS/ΜL (ref 0–0.1)
BASOPHILS NFR BLD AUTO: 1 % (ref 0–1)
BUN SERPL-MCNC: 15 MG/DL (ref 5–25)
CALCIUM SERPL-MCNC: 9.8 MG/DL (ref 8.4–10.2)
CHLORIDE SERPL-SCNC: 102 MMOL/L (ref 96–108)
CO2 SERPL-SCNC: 31 MMOL/L (ref 21–32)
CREAT SERPL-MCNC: 1.07 MG/DL (ref 0.6–1.3)
EOSINOPHIL # BLD AUTO: 0.05 THOUSAND/ΜL (ref 0–0.61)
EOSINOPHIL NFR BLD AUTO: 1 % (ref 0–6)
ERYTHROCYTE [DISTWIDTH] IN BLOOD BY AUTOMATED COUNT: 12.7 % (ref 11.6–15.1)
GFR SERPL CREATININE-BSD FRML MDRD: 92 ML/MIN/1.73SQ M
GLUCOSE SERPL-MCNC: 85 MG/DL (ref 65–140)
HCT VFR BLD AUTO: 43.4 % (ref 36.5–49.3)
HGB BLD-MCNC: 14.9 G/DL (ref 12–17)
IMM GRANULOCYTES # BLD AUTO: 0.02 THOUSAND/UL (ref 0–0.2)
IMM GRANULOCYTES NFR BLD AUTO: 0 % (ref 0–2)
LYMPHOCYTES # BLD AUTO: 2.8 THOUSANDS/ΜL (ref 0.6–4.47)
LYMPHOCYTES NFR BLD AUTO: 43 % (ref 14–44)
MCH RBC QN AUTO: 29 PG (ref 26.8–34.3)
MCHC RBC AUTO-ENTMCNC: 34.3 G/DL (ref 31.4–37.4)
MCV RBC AUTO: 84 FL (ref 82–98)
MONOCYTES # BLD AUTO: 0.54 THOUSAND/ΜL (ref 0.17–1.22)
MONOCYTES NFR BLD AUTO: 8 % (ref 4–12)
NEUTROPHILS # BLD AUTO: 3.04 THOUSANDS/ΜL (ref 1.85–7.62)
NEUTS SEG NFR BLD AUTO: 47 % (ref 43–75)
NRBC BLD AUTO-RTO: 0 /100 WBCS
PLATELET # BLD AUTO: 297 THOUSANDS/UL (ref 149–390)
PMV BLD AUTO: 9.1 FL (ref 8.9–12.7)
POTASSIUM SERPL-SCNC: 4 MMOL/L (ref 3.5–5.3)
RBC # BLD AUTO: 5.14 MILLION/UL (ref 3.88–5.62)
SODIUM SERPL-SCNC: 139 MMOL/L (ref 135–147)
WBC # BLD AUTO: 6.5 THOUSAND/UL (ref 4.31–10.16)

## 2022-07-25 PROCEDURE — 85025 COMPLETE CBC W/AUTO DIFF WBC: CPT

## 2022-07-25 PROCEDURE — 80048 BASIC METABOLIC PNL TOTAL CA: CPT

## 2022-07-25 PROCEDURE — 99214 OFFICE O/P EST MOD 30 MIN: CPT | Performed by: FAMILY MEDICINE

## 2022-07-25 PROCEDURE — 36415 COLL VENOUS BLD VENIPUNCTURE: CPT

## 2022-07-25 PROCEDURE — 3725F SCREEN DEPRESSION PERFORMED: CPT | Performed by: FAMILY MEDICINE

## 2022-07-25 RX ORDER — HYDROCORTISONE ACETATE 25 MG/1
25 SUPPOSITORY RECTAL 2 TIMES DAILY
Qty: 12 SUPPOSITORY | Refills: 0 | Status: SHIPPED | OUTPATIENT
Start: 2022-07-25 | End: 2022-07-28

## 2022-07-26 NOTE — PROGRESS NOTES
Assessment/Plan:    Hank Hagan presents with bright red blood per rectum for the past week and a half  He is recovering from an acute gastroenteritis where he was moving his bowels every hour  Last week he noted blood in the toilet bowl and toilet paper when wiping  He denies weight loss, fever, family history of inflammatory bowel disease, rectal pain, hemorrhoids, or regular use of NSAIDs  Rectal exam revealed internal hemorrhoids  Recommend Anusol suppositories  Given his complaints of fatigue, we will check CBC and BMP to look for dehydration and acute blood loss anemia  Patient is to hydrate and avoid blood thinners  Should symptoms persist, would recommend referral to GI  Problem List Items Addressed This Visit    None     Visit Diagnoses     BRBPR (bright red blood per rectum)    -  Primary    Relevant Medications    hydrocortisone (ANUSOL-HC) 25 mg suppository    Other Relevant Orders    CBC and differential (Completed)    Basic metabolic panel (Completed)    Internal hemorrhoids        Relevant Medications    hydrocortisone (ANUSOL-HC) 25 mg suppository    Gastroenteritis                Subjective:      Patient ID: Evelyn Olmstead is a 28 y o  male  Patient complains of bright red blood per rectum  Symptoms started 2 weeks ago after he developed gastroenteritis  He notes blood when wiping and bright red blood in the toilet bowel  This has been associated with fatigue  He denies abdominal bloating, early satiety, heartburn, nausea, shortness of breath and unexpected weight loss  He denies the use of nonsteroidal anti-inflammatory drugs on a regular bases, FH of IBD, and is not anticoagulated  The patient currently denies significant abdominal pain or discomfort  There is a past history of gastrointestinal bleeding following a gastroenteritis 4 years ago           The following portions of the patient's history were reviewed and updated as appropriate:   He  has a past medical history of COVID (01/09/2022), GERD (gastroesophageal reflux disease), and PONV (postoperative nausea and vomiting)  He   Patient Active Problem List    Diagnosis Date Noted    Gastroesophageal reflux disease 03/01/2022    Right peroneal nerve injury     Common peroneal neuropathy, right     PONV (postoperative nausea and vomiting) 03/15/2021    Sprain of anterior talofibular ligament of left ankle 02/27/2020    Ankle instability, left 02/27/2020    Closed displaced fracture of middle phalanx of right ring finger 09/27/2019    Aftercare following surgery 46/24/5789    Cyclic citrullinated peptide (CCP) antibody positive 09/25/2018    Tenosynovitis of right wrist 09/07/2018     He  has a past surgical history that includes Ankle surgery (Bilateral); Knee surgery (Right); Knee arthroscopy (Right); pr revise median n/carpal tunnel surg (Right, 9/20/2018); Synovectomy (Right, 9/20/2018); Tonsillectomy and adenoidectomy; Carpal tunnel release (Right); pr knee scope,med or lat menis repair (Right, 3/16/2021); FL injection right knee (arthrogram) (7/21/2021); and ARTHROSCOPY KNEE (Right, 3/1/2022)  His family history includes Arthritis in his mother; No Known Problems in his father  He  reports that he has never smoked  He has never used smokeless tobacco  He reports current alcohol use of about 3 0 standard drinks of alcohol per week  He reports that he does not use drugs    Current Outpatient Medications on File Prior to Visit   Medication Sig    acetaminophen (TYLENOL) 500 mg tablet Take 500 mg by mouth every 6 (six) hours as needed for mild pain    celecoxib (CeleBREX) 200 mg capsule Take 1 capsule (200 mg total) by mouth daily (Patient taking differently: Take 200 mg by mouth as needed for mild pain)    DAILY MULTIPLE VITAMINS PO Take by mouth daily     methocarbamol (Robaxin-750) 750 mg tablet Take 1 tablet (750 mg total) by mouth 3 (three) times a day as needed for muscle spasms     No current facility-administered medications on file prior to visit  He is allergic to ibuprofen       Review of Systems      Objective:      /86 (BP Location: Left arm, Patient Position: Sitting, Cuff Size: Standard)   Pulse 79   Temp 98 2 °F (36 8 °C) (Tympanic)   Resp 14   Ht 5' 11" (1 803 m)   Wt 88 6 kg (195 lb 6 4 oz)   SpO2 100%   BMI 27 25 kg/m²          Physical Exam  Vitals reviewed  Exam conducted with a chaperone present (Екатерина Meier LPN)  Constitutional:       General: He is not in acute distress  Appearance: Normal appearance  He is not ill-appearing  HENT:      Head: Normocephalic and atraumatic  Cardiovascular:      Rate and Rhythm: Normal rate and regular rhythm  Heart sounds: No murmur heard  Pulmonary:      Effort: Pulmonary effort is normal  No respiratory distress  Breath sounds: No stridor  No wheezing, rhonchi or rales  Abdominal:      General: Abdomen is flat  There is no distension  Palpations: There is no mass  Tenderness: There is no abdominal tenderness  There is no guarding or rebound  Hernia: No hernia is present  Genitourinary:     Rectum: Guaiac result negative  Internal hemorrhoid present  No tenderness or external hemorrhoid  Normal anal tone  Skin:     General: Skin is warm  Coloration: Skin is not pale  Neurological:      Mental Status: He is alert and oriented to person, place, and time  Psychiatric:         Mood and Affect: Mood normal          Behavior: Behavior normal          Thought Content:  Thought content normal

## 2022-08-22 ENCOUNTER — TELEPHONE (OUTPATIENT)
Dept: OBGYN CLINIC | Facility: OTHER | Age: 32
End: 2022-08-22

## 2022-08-22 NOTE — TELEPHONE ENCOUNTER
patients wife Meeta Samayoa called in ,     He has had previous right knee surgeries with you and recently twisted his knee funny and wanted to if you could order an MRI prior to his follow up with you on September 2nd      C/b # [phone]

## 2022-08-23 DIAGNOSIS — Z98.890 S/P LATERAL MENISCUS REPAIR OF RIGHT KNEE: ICD-10-CM

## 2022-08-23 DIAGNOSIS — M23.91 INTERNAL DERANGEMENT OF RIGHT KNEE: Primary | ICD-10-CM

## 2022-08-23 NOTE — TELEPHONE ENCOUNTER
Patient returning call to [facility] regarding giving more information to [place]

## 2022-08-23 NOTE — TELEPHONE ENCOUNTER
I attempted to speak with patient and wife and left a voicemail  I wanted to discuss more of the specifics of his twisting event  Regardless of the even I still feel an MRI is warranted to r/o re-tear of repair lateral meniscus  I have placed a STAT order for MRI arthrogram  I only placed the order as stat to be able to get the imaging prior to follow-up appointment, I know we traditionally cant get arthrograms stat but I was hoping it would get him imaging sooner  Are you able to call the patient to schedule the MRI or a voicemail with the phone number with central scheduling to schedule?

## 2022-08-23 NOTE — TELEPHONE ENCOUNTER
Patient called concerning the MRI  Connection was poor and he will call back and ask for central scheduling    Thank you

## 2022-08-24 ENCOUNTER — HOSPITAL ENCOUNTER (OUTPATIENT)
Dept: RADIOLOGY | Facility: HOSPITAL | Age: 32
Discharge: HOME | End: 2022-08-24
Payer: COMMERCIAL

## 2022-08-24 DIAGNOSIS — Z98.890 S/P LATERAL MENISCUS REPAIR OF RIGHT KNEE: ICD-10-CM

## 2022-08-24 DIAGNOSIS — M23.91 INTERNAL DERANGEMENT OF RIGHT KNEE: ICD-10-CM

## 2022-08-24 PROCEDURE — 27369 NJX CNTRST KNE ARTHG/CT/MRI: CPT

## 2022-08-24 PROCEDURE — 73722 MRI JOINT OF LWR EXTR W/DYE: CPT

## 2022-08-24 PROCEDURE — G1004 CDSM NDSC: HCPCS

## 2022-08-24 PROCEDURE — A9585 GADOBUTROL INJECTION: HCPCS | Performed by: PHYSICIAN ASSISTANT

## 2022-08-24 PROCEDURE — 20610 DRAIN/INJ JOINT/BURSA W/O US: CPT

## 2022-08-24 PROCEDURE — 77002 NEEDLE LOCALIZATION BY XRAY: CPT

## 2022-08-24 RX ORDER — SODIUM CHLORIDE 9 MG/ML
50 INJECTION INTRAVENOUS
Status: COMPLETED | OUTPATIENT
Start: 2022-08-24 | End: 2022-08-24

## 2022-08-24 RX ORDER — LIDOCAINE HYDROCHLORIDE 10 MG/ML
10 INJECTION, SOLUTION EPIDURAL; INFILTRATION; INTRACAUDAL; PERINEURAL
Status: COMPLETED | OUTPATIENT
Start: 2022-08-24 | End: 2022-08-24

## 2022-08-24 RX ADMIN — SODIUM CHLORIDE 45 ML: 9 INJECTION, SOLUTION INTRAMUSCULAR; INTRAVENOUS; SUBCUTANEOUS at 12:55

## 2022-08-24 RX ADMIN — IOHEXOL 4 ML: 300 INJECTION, SOLUTION INTRAVENOUS at 12:55

## 2022-08-24 RX ADMIN — GADOBUTROL 2 ML: 604.72 INJECTION INTRAVENOUS at 12:55

## 2022-08-24 RX ADMIN — LIDOCAINE HYDROCHLORIDE 6 ML: 10 INJECTION, SOLUTION EPIDURAL; INFILTRATION; INTRACAUDAL; PERINEURAL at 12:55

## 2022-08-29 ENCOUNTER — OFFICE VISIT (OUTPATIENT)
Dept: OBGYN CLINIC | Facility: MEDICAL CENTER | Age: 32
End: 2022-08-29
Payer: COMMERCIAL

## 2022-08-29 VITALS
HEIGHT: 71 IN | DIASTOLIC BLOOD PRESSURE: 81 MMHG | HEART RATE: 87 BPM | WEIGHT: 197 LBS | SYSTOLIC BLOOD PRESSURE: 122 MMHG | BODY MASS INDEX: 27.58 KG/M2

## 2022-08-29 DIAGNOSIS — Z98.890 S/P LATERAL MENISCUS REPAIR OF RIGHT KNEE: Primary | ICD-10-CM

## 2022-08-29 DIAGNOSIS — S84.11XA INJURY OF RIGHT PERONEAL NERVE, INITIAL ENCOUNTER: ICD-10-CM

## 2022-08-29 PROCEDURE — 99213 OFFICE O/P EST LOW 20 MIN: CPT | Performed by: ORTHOPAEDIC SURGERY

## 2022-08-29 NOTE — PROGRESS NOTES
Ortho Sports Medicine Knee Visit     Assesment:   right knee nearly 5 months s/p surgical arthroscopy of the right knee with removal of lateral mensicus repair suture and anchor with lysis of adhesions of popliteal hiatus, DOS: 3/1/22 who was doing well until new injury 3 weeks ago, concerned about new lateral meniscus tear even though MRI arthrogram showed no obvious lateral meniscal tear  S/p right peroneal nerve decompression 8/31/22 by Dr Antony Panchal at [facility], recommend seeing Dr Antony Panchal in 6 weeks to discuss return of peroneal nerve pain if this does not improve by that time frame    Plan:    Conservative treatment:    Ice to knee for 20 minutes at least 1-2 times daily  OTC NSAIDS prn for pain  At this point surgical intervention not recommended, if in 4 weeks his symptoms persist then would recommend diagnostic arthroscopy to see if he does have recurrent lateral meniscal tear  Avoid any deep squats, pivoting or twisting motions  Imaging: All imaging from today was reviewed by myself and explained to the patient  Injection:    No Injection planned at this time  Surgery:     No surgery is recommended at this point, continue with conservative treatment plan as noted  History of Present Illness: The patient is returns for follow up of his right knee  S/p surgical arthroscopy of the right knee with removal of lateral mensicus repair suture and anchor with lysis of adhesions of popliteal hiatus, DOS: 3/1/22 which he was doing very well with no pain or instability  He did call last week due to twisting injury 2 weeks prior to his knee and was concerned about a meniscal tear  Turned with foot planted and felt immediate pain laterally with searing pain  The pain has not improved   Noting instability, swelling in his knee and also return of nerve pain down lateral calf and up into distal 1/3 lateral thigh which had also resolved after last surgery by Dr Antony Panchal at [facility] peroneal nerve decompression 8/31/22 a week after twisting injury  MRI was ordered and patient is here to review  Pain is located lateral      Pain is improved by rest, ice and NSAIDS  Pain is aggravated by stairs, squatting, weight bearing and pivoting on a planted foot  Notes instability in right knee  Symptoms include clicking, catching and popping  The patient has tried rest, ice, NSAIDS and activity modification  I have reviewed the past medical, surgical, social and family history, medications and allergies as documented in the EMR  Review of systems: ROS is negative other than that noted in the HPI  Constitutional: Negative for fatigue and fever     Cardiovascular: Negative for chest pain  Pulmonary: negative for shortness of breath    PMH/PSH:  Past Medical History:   Diagnosis Date    COVID 01/09/2022    Fever,fatigue    GERD (gastroesophageal reflux disease)     rarely    PONV (postoperative nausea and vomiting)     3/11/21 Pt states "deals with slight nausea after anesthesia "     Past Surgical History:   Procedure Laterality Date    ANKLE SURGERY Bilateral     ARTHROSCOPY KNEE Right 3/1/2022    Procedure: RIGHT KNEE ARTHROSCOPY, ARTHROSCOPIC REMOVAL OF HARDWARE;  Surgeon: Mook Allen DO;  Location: [address] [place] [address];  Service: Orthopedics    CARPAL TUNNEL RELEASE Right     FL INJECTION RIGHT KNEE (ARTHROGRAM)  7/21/2021    FL INJECTION RIGHT KNEE (ARTHROGRAM)  8/24/2022    KNEE ARTHROSCOPY Right     KNEE SURGERY Right     MS KNEE SCOPE,MED OR LAT MENIS REPAIR Right 3/16/2021    Procedure: ARTHROSCOPIC LATERAL MENISCUS REPAIR;  Surgeon: Mook Allen DO;  Location: [address] [place] [address];  Service: Orthopedics    MS REVISE MEDIAN N/CARPAL TUNNEL SURG Right 9/20/2018    Procedure: RELEASE CARPAL TUNNEL;  Surgeon: Margarita Kurtz MD;  Location: [facility];  Service: Orthopedics    SYNOVECTOMY Right 9/20/2018    Procedure: TENOSYNOVECTOMY Flexor digitorum superficialis to index, long, ring and small finger; Flexor digitorum profundus to index, long, ring and small finger; Flexor pollicis longus;  Surgeon: Fady Dash MD;  Location: [facility] OR;  Service: Orthopedics    TONSILLECTOMY AND ADENOIDECTOMY          Physical Exam:    Blood pressure 122/81, pulse 87, height 5' 11" (1 803 m), weight 89 4 kg (197 lb)  General/Constitutional: NAD, well developed, well nourished  HENT: Normocephalic, atraumatic  CV: Intact distal pulses, regular rate  Resp: No respiratory distress or labored breathing  Lymphatic: No lymphadenopathy palpated  Neuro: Alert and Oriented x 3, no focal deficits  Psych: Normal mood, normal affect, normal judgement, normal behavior  Skin: Warm, dry, no rashes, no erythema       Knee Exam (focused): RIGHT LEFT   ROM:   0-130 0-130   Palpation: Effusion mild negative     MJL tenderness Negative Negative     LJL tenderness Positive Negative   Instability: Varus stable stable     Valgus stable stable   Special Tests: Lachman Negative Negative     Posterior drawer Negative Negative     Anterior drawer Negative Negative     Pivot shift not tested not tested     Dial not tested not tested   Patella: Palpation no tenderness no tenderness     Mobility 1/4 1/4     Apprehension Negative Negative   Other: Single leg 1/4 squat not tested not tested      LE NV Exam: +2 DP/PT pulses bilaterally  Sensation intact to light touch L2-S1 bilaterally    No calf tenderness to palpation bilaterally      Knee Imaging    MRI of the right knee were reviewed, which demonstrate no discrete meniscus tear, small bakers cyst, mild cartilage thinning and fissuring medial facet of patellar articular cartilage     I have reviewed the radiology report and agree with their impression        Scribe Attestation    I,:  Margaree Galeazzi am acting as a scribe while in the presence of the attending physician :       I,:  Judy Yu DO personally performed the services described in this documentation    as scribed in my presence :

## 2022-09-15 ENCOUNTER — TELEPHONE (OUTPATIENT)
Dept: OBGYN CLINIC | Facility: MEDICAL CENTER | Age: 32
End: 2022-09-15

## 2022-09-15 ENCOUNTER — OFFICE VISIT (OUTPATIENT)
Dept: OBGYN CLINIC | Facility: MEDICAL CENTER | Age: 32
End: 2022-09-15
Payer: OTHER MISCELLANEOUS

## 2022-09-15 VITALS
DIASTOLIC BLOOD PRESSURE: 78 MMHG | WEIGHT: 193 LBS | BODY MASS INDEX: 27.02 KG/M2 | SYSTOLIC BLOOD PRESSURE: 122 MMHG | HEIGHT: 71 IN

## 2022-09-15 DIAGNOSIS — M76.31 IT BAND SYNDROME, RIGHT: Primary | ICD-10-CM

## 2022-09-15 DIAGNOSIS — Z98.890 H/O LATERAL MENISCUS REPAIR OF RIGHT KNEE: ICD-10-CM

## 2022-09-15 PROCEDURE — 99214 OFFICE O/P EST MOD 30 MIN: CPT | Performed by: ORTHOPAEDIC SURGERY

## 2022-09-15 RX ORDER — GABAPENTIN 300 MG/1
300 CAPSULE ORAL ONCE
Status: CANCELLED | OUTPATIENT
Start: 2022-10-18 | End: 2022-09-15

## 2022-09-15 RX ORDER — CHLORHEXIDINE GLUCONATE 0.12 MG/ML
15 RINSE ORAL ONCE
Status: CANCELLED | OUTPATIENT
Start: 2022-10-18 | End: 2022-09-15

## 2022-09-15 RX ORDER — CHLORHEXIDINE GLUCONATE 4 G/100ML
SOLUTION TOPICAL DAILY PRN
Status: CANCELLED | OUTPATIENT
Start: 2022-10-18

## 2022-09-15 RX ORDER — CEFAZOLIN SODIUM 2 G/50ML
2000 SOLUTION INTRAVENOUS ONCE
Status: CANCELLED | OUTPATIENT
Start: 2022-10-18 | End: 2022-09-15

## 2022-09-15 RX ORDER — ACETAMINOPHEN 325 MG/1
975 TABLET ORAL ONCE
Status: CANCELLED | OUTPATIENT
Start: 2022-10-18 | End: 2022-09-15

## 2022-09-15 NOTE — TELEPHONE ENCOUNTER
Patient sees Dr David You  Patient is calling and asking office to fax the office notes, work note and medical records to why he is having surgery  Marcia Ying to Claim # [id number], fax # [phone]  Fax completed

## 2022-09-15 NOTE — PROGRESS NOTES
Ortho Sports Medicine Knee Visit     Assesment:   31M s/p surgical arthroscopy of the right knee with removal of lateral mensicus repair suture and anchor with lysis of adhesions of popliteal hiatus, DOS: 3/1/22 who was doing well until new recent injury, concerned about new lateral meniscus tear even though MRI arthrogram showed no obvious lateral meniscal tear  S/p right peroneal nerve decompression 8/31/22 by Dr Anabela Escamilla at [facility], who he has follow up scheduled next month  Plan:    Conservative treatment:    Ice to knee for 20 minutes at least 1-2 times daily  PT for ROM/strengthening to knee, hip and core  OTC NSAIDS prn for pain  Tylenol for pain  At this point surgical intervention is recommended, since his symptoms have persisted  Avoid any deep squats, pivoting or twisting motions    Injection:    No Injection planned at this time  Surgery: All of the risks and benefits of operative treatment were explained to the patient, as well as the risks and benefits of any alternative treatment options, including nonoperative care  The risks of surgical treatement include, but are not limited to, infection, bleeding, blood clot, neurovascular damage, need for further surgery, continued pain, cardiovascular risk, and anesthesia risk  The patient understood this and elects to proceed forward with surgical intervention  We will proceed forward with diagnostic surgical arthroscopy of the right knee with possible lateral meniscectomy, lysis of adhesions, removal of prior sutures/anchors as indicated during the diagnostic scope  Also discussion and consent obtained for resection of distal IT band over the lateral femur epicondyle depending on results of arthroscopy  History of Present Illness: The patient is returns for follow up of his right knee  S/p surgical arthroscopy of the right knee with removal of lateral mensicus repair suture and anchor with lysis of adhesions of popliteal hiatus, DOS: 3/1/22 with persistent pain and dysfunction since recent injury 4 weeks ago  The pain has not improved  Pain is located lateral    Pain is improved by rest, ice and NSAIDS  Pain is aggravated by stairs, squatting, weight bearing and pivoting on a planted foot  Notes instability in right knee  Symptoms include clicking, catching and popping  The patient has tried rest, ice, NSAIDS and activity modification  I have reviewed the past medical, surgical, social and family history, medications and allergies as documented in the EMR  Review of systems: ROS is negative other than that noted in the HPI  Constitutional: Negative for fatigue and fever     Cardiovascular: Negative for chest pain  Pulmonary: negative for shortness of breath    PMH/PSH:  Past Medical History:   Diagnosis Date    COVID 01/09/2022    Fever,fatigue    GERD (gastroesophageal reflux disease)     rarely    PONV (postoperative nausea and vomiting)     3/11/21 Pt states "deals with slight nausea after anesthesia "     Past Surgical History:   Procedure Laterality Date    ANKLE SURGERY Bilateral     ARTHROSCOPY KNEE Right 3/1/2022    Procedure: RIGHT KNEE ARTHROSCOPY, ARTHROSCOPIC REMOVAL OF HARDWARE;  Surgeon: Karyle Hunter, DO;  Location: [address] [place] [address];  Service: Orthopedics    CARPAL TUNNEL RELEASE Right     FL INJECTION RIGHT KNEE (ARTHROGRAM)  7/21/2021    FL INJECTION RIGHT KNEE (ARTHROGRAM)  8/24/2022    KNEE ARTHROSCOPY Right     KNEE SURGERY Right     LA KNEE SCOPE,MED OR LAT MENIS REPAIR Right 3/16/2021    Procedure: ARTHROSCOPIC LATERAL MENISCUS REPAIR;  Surgeon: Karyle Hunter, DO;  Location: [address] [place] [address] OR;  Service: Orthopedics    LA REVISE MEDIAN N/CARPAL TUNNEL SURG Right 9/20/2018    Procedure: RELEASE CARPAL TUNNEL;  Surgeon: Margarita Kurtz MD;  Location: [facility] OR;  Service: Orthopedics    SYNOVECTOMY Right 9/20/2018    Procedure: TENOSYNOVECTOMY Flexor digitorum superficialis to index, long, ring and small finger; Flexor digitorum profundus to index, long, ring and small finger; Flexor pollicis longus;  Surgeon: Tom Tejada MD;  Location:  MAIN OR;  Service: Orthopedics    TONSILLECTOMY AND ADENOIDECTOMY          Physical Exam:    Blood pressure 122/78, height 5' 11" (1 803 m), weight 87 5 kg (193 lb)  General/Constitutional: NAD, well developed, well nourished  HENT: Normocephalic, atraumatic  CV: Intact distal pulses, regular rate  Resp: No respiratory distress or labored breathing  Lymphatic: No lymphadenopathy palpated  Neuro: Alert and Oriented x 3, no focal deficits  Psych: Normal mood, normal affect, normal judgement, normal behavior  Skin: Warm, dry, no rashes, no erythema       Knee Exam (focused): RIGHT LEFT   ROM:   0-130 0-130   Palpation: Effusion mild negative     MJL tenderness Negative Negative     LJL tenderness Positive Negative   Instability: Varus stable stable     Valgus stable stable   Special Tests: Lachman Negative Negative     Posterior drawer Negative Negative     Anterior drawer Negative Negative     Pivot shift not tested not tested     Dial not tested not tested   Patella: Palpation no tenderness no tenderness     Mobility 1/4 1/4     Apprehension Negative Negative   Other: Single leg 1/4 squat not tested not tested    TTP over IT band over the lateral femoral epicondyle  LE NV Exam: +2 DP/PT pulses bilaterally  Sensation intact to light touch L2-S1 bilaterally    No calf tenderness to palpation bilaterally      Knee Imaging    MRI of the right knee were reviewed, which demonstrate no discrete meniscus tear, small bakers cyst, mild cartilage thinning and fissuring medial facet of patellar articular cartilage     I have reviewed the radiology report and agree with their impression

## 2022-09-15 NOTE — LETTER
September 15, 2022     Patient: Johnny Maldonado  YOB: 1990  Date of Visit: 9/15/2022      To Whom it May Concern:    Johnny Maldonado is under my professional care  Washington Nilamr was seen in my office on 9/15/2022  Washington Sizer may return to work with limitations light duty; of not lifting more than 20 pounds,   If you have any questions or concerns, please don't hesitate to call           Sincerely,          Jay Berrios DO        CC: No Recipients

## 2022-09-16 ENCOUNTER — TELEPHONE (OUTPATIENT)
Dept: OBGYN CLINIC | Facility: HOSPITAL | Age: 32
End: 2022-09-16

## 2022-09-16 NOTE — TELEPHONE ENCOUNTER
April Nj @ [employer] called to have the following notes faxed to them today  Fax: [phone]    Faxed note 08/29  Faxed MRI & Arthroram 08/24  All completed & faxed

## 2022-10-14 NOTE — PRE-PROCEDURE INSTRUCTIONS
Pre-Surgery Instructions:   Medication Instructions   • acetaminophen (TYLENOL) 500 mg tablet Uses PRN- OK to take day of surgery   • celecoxib (CeleBREX) 200 mg capsule Stop taking 3 days prior to surgery  • DAILY MULTIPLE VITAMINS PO Hold until after surgery      Reviewed with patient, in detail, instructions from "My Surgical Experience"  Instructed to avoid all  OTC vitamins/supplements and NSAIDS from now until after surgery per anesthesia guidelines  Tylenol ok to take PRN  Advised patient that Michael Cartwright will call with surgery arrival time and hospital directions the business day prior to surgery  Advised patient nothing eat or drink after midnight prior to surgery  Instructed to call surgeon's office in meantime with any new illnesses/exposure  Patient verbalized understanding of current visitor restrictions/masking guidelines and advised that he/she can confirm these at time of arrival call with Michael Cartwright  Patient verbalized understanding and knows to call surgeon's office with any additional questions prior to surgery

## 2022-10-16 RX ORDER — SODIUM CHLORIDE, SODIUM LACTATE, POTASSIUM CHLORIDE, CALCIUM CHLORIDE 600; 310; 30; 20 MG/100ML; MG/100ML; MG/100ML; MG/100ML
50 INJECTION, SOLUTION INTRAVENOUS CONTINUOUS
Status: CANCELLED | OUTPATIENT
Start: 2022-10-18

## 2022-10-17 RX ORDER — CEFAZOLIN SODIUM 2 G/50ML
2000 SOLUTION INTRAVENOUS ONCE
Status: CANCELLED | OUTPATIENT
Start: 2022-10-25 | End: 2022-10-17

## 2022-10-17 RX ORDER — TRAMADOL HYDROCHLORIDE 50 MG/1
50 TABLET ORAL EVERY 6 HOURS PRN
Status: CANCELLED | OUTPATIENT
Start: 2022-10-17

## 2022-10-17 RX ORDER — ACETAMINOPHEN 325 MG/1
650 TABLET ORAL EVERY 6 HOURS PRN
Status: CANCELLED | OUTPATIENT
Start: 2022-10-17

## 2022-10-25 ENCOUNTER — ANESTHESIA EVENT (OUTPATIENT)
Dept: PERIOP | Facility: HOSPITAL | Age: 32
End: 2022-10-25
Payer: COMMERCIAL

## 2022-10-25 ENCOUNTER — HOSPITAL ENCOUNTER (OUTPATIENT)
Facility: HOSPITAL | Age: 32
Setting detail: OUTPATIENT SURGERY
Discharge: HOME | End: 2022-10-25
Attending: ORTHOPAEDIC SURGERY | Admitting: ORTHOPAEDIC SURGERY
Payer: COMMERCIAL

## 2022-10-25 ENCOUNTER — ANESTHESIA (OUTPATIENT)
Dept: PERIOP | Facility: HOSPITAL | Age: 32
End: 2022-10-25
Payer: COMMERCIAL

## 2022-10-25 VITALS
OXYGEN SATURATION: 98 % | SYSTOLIC BLOOD PRESSURE: 115 MMHG | TEMPERATURE: 97.5 F | RESPIRATION RATE: 16 BRPM | HEART RATE: 58 BPM | WEIGHT: 193 LBS | HEIGHT: 71 IN | BODY MASS INDEX: 27.02 KG/M2 | DIASTOLIC BLOOD PRESSURE: 69 MMHG

## 2022-10-25 DIAGNOSIS — Z98.890 S/P ARTHROSCOPIC SURGERY OF RIGHT KNEE: Primary | ICD-10-CM

## 2022-10-25 PROCEDURE — 29881 ARTHRS KNE SRG MNISECTMY M/L: CPT | Performed by: ORTHOPAEDIC SURGERY

## 2022-10-25 PROCEDURE — 29881 ARTHRS KNE SRG MNISECTMY M/L: CPT | Performed by: PHYSICIAN ASSISTANT

## 2022-10-25 PROCEDURE — 27305 INCISE THIGH TENDON & FASCIA: CPT | Performed by: ORTHOPAEDIC SURGERY

## 2022-10-25 PROCEDURE — 27305 INCISE THIGH TENDON & FASCIA: CPT | Performed by: PHYSICIAN ASSISTANT

## 2022-10-25 PROCEDURE — NC001 PR NO CHARGE: Performed by: ORTHOPAEDIC SURGERY

## 2022-10-25 RX ORDER — SODIUM CHLORIDE, SODIUM LACTATE, POTASSIUM CHLORIDE, CALCIUM CHLORIDE 600; 310; 30; 20 MG/100ML; MG/100ML; MG/100ML; MG/100ML
125 INJECTION, SOLUTION INTRAVENOUS CONTINUOUS
Status: DISCONTINUED | OUTPATIENT
Start: 2022-10-25 | End: 2022-10-25 | Stop reason: HOSPADM

## 2022-10-25 RX ORDER — METOCLOPRAMIDE HYDROCHLORIDE 5 MG/ML
10 INJECTION INTRAMUSCULAR; INTRAVENOUS ONCE
Status: COMPLETED | OUTPATIENT
Start: 2022-10-25 | End: 2022-10-25

## 2022-10-25 RX ORDER — ONDANSETRON 2 MG/ML
INJECTION INTRAMUSCULAR; INTRAVENOUS AS NEEDED
Status: DISCONTINUED | OUTPATIENT
Start: 2022-10-25 | End: 2022-10-25

## 2022-10-25 RX ORDER — LIDOCAINE HYDROCHLORIDE 10 MG/ML
INJECTION, SOLUTION EPIDURAL; INFILTRATION; INTRACAUDAL; PERINEURAL AS NEEDED
Status: DISCONTINUED | OUTPATIENT
Start: 2022-10-25 | End: 2022-10-25

## 2022-10-25 RX ORDER — CEFAZOLIN SODIUM 2 G/50ML
2000 SOLUTION INTRAVENOUS ONCE
Status: COMPLETED | OUTPATIENT
Start: 2022-10-25 | End: 2022-10-25

## 2022-10-25 RX ORDER — OXYCODONE HYDROCHLORIDE 5 MG/1
5 TABLET ORAL EVERY 4 HOURS PRN
Qty: 15 TABLET | Refills: 0 | Status: SHIPPED | OUTPATIENT
Start: 2022-10-25

## 2022-10-25 RX ORDER — FENTANYL CITRATE 50 UG/ML
INJECTION, SOLUTION INTRAMUSCULAR; INTRAVENOUS AS NEEDED
Status: DISCONTINUED | OUTPATIENT
Start: 2022-10-25 | End: 2022-10-25

## 2022-10-25 RX ORDER — TRAMADOL HYDROCHLORIDE 50 MG/1
50 TABLET ORAL EVERY 6 HOURS PRN
Status: DISCONTINUED | OUTPATIENT
Start: 2022-10-25 | End: 2022-10-25 | Stop reason: HOSPADM

## 2022-10-25 RX ORDER — ACETAMINOPHEN 325 MG/1
650 TABLET ORAL EVERY 6 HOURS PRN
Status: DISCONTINUED | OUTPATIENT
Start: 2022-10-25 | End: 2022-10-25 | Stop reason: HOSPADM

## 2022-10-25 RX ORDER — HYDROMORPHONE HCL/PF 1 MG/ML
0.5 SYRINGE (ML) INJECTION
Status: DISCONTINUED | OUTPATIENT
Start: 2022-10-25 | End: 2022-10-25 | Stop reason: HOSPADM

## 2022-10-25 RX ORDER — SCOLOPAMINE TRANSDERMAL SYSTEM 1 MG/1
1 PATCH, EXTENDED RELEASE TRANSDERMAL ONCE
Status: DISCONTINUED | OUTPATIENT
Start: 2022-10-25 | End: 2022-10-25 | Stop reason: HOSPADM

## 2022-10-25 RX ORDER — MIDAZOLAM HYDROCHLORIDE 2 MG/2ML
INJECTION, SOLUTION INTRAMUSCULAR; INTRAVENOUS AS NEEDED
Status: DISCONTINUED | OUTPATIENT
Start: 2022-10-25 | End: 2022-10-25

## 2022-10-25 RX ORDER — MAGNESIUM HYDROXIDE 1200 MG/15ML
LIQUID ORAL AS NEEDED
Status: DISCONTINUED | OUTPATIENT
Start: 2022-10-25 | End: 2022-10-25 | Stop reason: HOSPADM

## 2022-10-25 RX ORDER — PROPOFOL 10 MG/ML
INJECTION, EMULSION INTRAVENOUS AS NEEDED
Status: DISCONTINUED | OUTPATIENT
Start: 2022-10-25 | End: 2022-10-25

## 2022-10-25 RX ORDER — DEXAMETHASONE SODIUM PHOSPHATE 10 MG/ML
INJECTION, SOLUTION INTRAMUSCULAR; INTRAVENOUS AS NEEDED
Status: DISCONTINUED | OUTPATIENT
Start: 2022-10-25 | End: 2022-10-25

## 2022-10-25 RX ORDER — FENTANYL CITRATE/PF 50 MCG/ML
50 SYRINGE (ML) INJECTION
Status: COMPLETED | OUTPATIENT
Start: 2022-10-25 | End: 2022-10-25

## 2022-10-25 RX ADMIN — PROPOFOL 200 MG: 10 INJECTION, EMULSION INTRAVENOUS at 10:11

## 2022-10-25 RX ADMIN — LIDOCAINE HYDROCHLORIDE 50 MG: 10 INJECTION, SOLUTION EPIDURAL; INFILTRATION; INTRACAUDAL at 10:11

## 2022-10-25 RX ADMIN — SODIUM CHLORIDE, SODIUM LACTATE, POTASSIUM CHLORIDE, AND CALCIUM CHLORIDE: .6; .31; .03; .02 INJECTION, SOLUTION INTRAVENOUS at 10:05

## 2022-10-25 RX ADMIN — CEFAZOLIN SODIUM 2000 MG: 2 SOLUTION INTRAVENOUS at 10:08

## 2022-10-25 RX ADMIN — FENTANYL CITRATE 50 MCG: 50 INJECTION, SOLUTION INTRAMUSCULAR; INTRAVENOUS at 11:54

## 2022-10-25 RX ADMIN — FENTANYL CITRATE 50 MCG: 50 INJECTION, SOLUTION INTRAMUSCULAR; INTRAVENOUS at 10:10

## 2022-10-25 RX ADMIN — FENTANYL CITRATE 50 MCG: 50 INJECTION, SOLUTION INTRAMUSCULAR; INTRAVENOUS at 10:40

## 2022-10-25 RX ADMIN — SCOPALAMINE 1 PATCH: 1 PATCH, EXTENDED RELEASE TRANSDERMAL at 09:42

## 2022-10-25 RX ADMIN — DEXAMETHASONE SODIUM PHOSPHATE 10 MG: 10 INJECTION, SOLUTION INTRAMUSCULAR; INTRAVENOUS at 10:15

## 2022-10-25 RX ADMIN — MIDAZOLAM 2 MG: 1 INJECTION INTRAMUSCULAR; INTRAVENOUS at 10:10

## 2022-10-25 RX ADMIN — METOCLOPRAMIDE 10 MG: 5 INJECTION, SOLUTION INTRAMUSCULAR; INTRAVENOUS at 12:18

## 2022-10-25 RX ADMIN — ONDANSETRON 4 MG: 2 INJECTION INTRAMUSCULAR; INTRAVENOUS at 10:15

## 2022-10-25 RX ADMIN — FENTANYL CITRATE 50 MCG: 50 INJECTION, SOLUTION INTRAMUSCULAR; INTRAVENOUS at 11:51

## 2022-10-25 NOTE — OP NOTE
OPERATIVE REPORT  PATIENT NAME: Jordyn Pierce    :  1990  MRN: [id number]  Pt Location:  OR ROOM 11    SURGERY DATE: 10/25/2022    Surgeon(s) and Role:     * Kyle Perdue DO - Primary     * Thang Dove PA-C - Assisting    Preop Diagnosis:  It band syndrome, right [M76 31]  H/O lateral meniscus repair of right knee [Z98 890]    Post-Op Diagnosis Codes:     * It band syndrome, right [M76 31]     * H/O lateral meniscus repair of right knee [Z98 890]    Procedure(s) (LRB):  RELEASE ILIOTIBIAL BAND (Right)  MENISCECTOMY LATERAL (Right)    Specimen(s):  * No specimens in log *    Estimated Blood Loss:   Minimal    Drains:  * No LDAs found *    Anesthesia Type:   General    Operative Indications: It band syndrome, right [M76 31]  H/O lateral meniscus repair of right knee [A78 799]      Complications:   None      Operation:        1   Surgical arthroscopy of the Right knee with partial lateral meniscectomy      2  Right knee open distal IT band resection    Anesthesia:  general     Tourniquet Time:  None      Blood Loss:  Minimal      Indications: Mr Edward Syed is a 33 y o  male   Due to the patient's MRI findings, active lifestyle, and lack of improvement with a conservative approach, it was recommended that they proceed forward with arthroscopic surgical management of this problem  We reviewed risks and benefits of surgery and a decision was made to proceed with surgery to address the torn lateral meniscus             Findings:       Examination under anesthesia of the operative Right knee revealed a range of motion of 0-130 degrees  Posterior drawer testing was negative  Lachman testing was negative  Pivot shift testing was negative,  Collateral ligament stability testing revealed no laxity with valgus or varus stresses  With respect to posterolateral corner testing, dial testing at 30 and at 90 degrees was symmetric to the contralateral knee       Arthroscopic evaluation of the knee revealed the following:     Medial meniscus: No Tears   Medial femoral condyle:Grade III chondral defects  Medial tibial plateau: Grade  0 chondral defects  Anterior cruciate ligament: Normal appearance  Posterior cruciate ligament: Normal appearance  Lateral meniscus: There was a small inner margin tear of the lateral meniscus     Lateral femoral condyle: Grade 0 chondral defects  Lateral tibial plateau: GradeI chondral defects     Medial and lateral gutters: No loose bodies  Patella: Grade 0 chondral defects  Trochlea: Grade 0 chondral defects  Medial plica: No significant plica was present             Procedure:  In the pre-operative holding area, the patient identified the correct operative extremity and I marked that extremity with my initials, using a permanent marker  The patient was brought to the operating room and positioned supine  Following satisfactory induction of anesthesia, the Right knee was prepped and draped in the usual sterile fashion for surgical arthroscopy of the Right knee  Before any surgical instrumentation was passed to me by the surgical technician, a formalized time-out occurred, which involves the surgeon, circulating nurse, and anesthesia staff all verifying the correct operative extremity  My initials were visible on the prepped and draped operative field  The anatomic landmarks of the anteromedial and anterolateral portals were marked  The anterolateral portal was established with a scalpel  The arthroscope was introduced through this portal  Under direct visualization, the anteromedial portal was established with a localizing needle followed by a scalpel  A probe was then introduced into the anteromedial portal  A systematic diagnostic arthroscopy evaluated the following:  medial compartment, notch, lateral compartment, patellofemoral compartment, medial gutter, and lateral gutter  A small inner margin lateral meniscus tear was noted   This tear was associated with meniscal fragments that were mildly unstable to probing  The lateral meniscus tear was debrided to a stable base, using the arthroscopic biters and motorized shaver  There was only a 1-2 mm inner rim of the meniscus removed and no further tearing was seen  Due to the intact body of the lateral meniscus, it was felt that doing a significant meniscectomy was not necessary as there were no unstable flaps, and the risk of future osteoarthritis would be significantly higher with a meniscectomy that removed most of the meniscus  Because the patient also had significant lateral IT band pain, attention was then turned to the distal IT band resection  No hardware was seen to be prominent and no scar tissue was seen impinging or limiting the mobility of the lateral meniscus  Attention was then turned to the distal IT band resection open      A 2 5 in incision was made vertically in line with the IT band and was centered over the lateral epicondyle   The subcutaneous bleeders were electrocauterized meticulously   The distal IT band fascia was then encountered   There was obvious pressure and impingement with flexion extension between the ITB band and the lateral epicondyle   A fadi-shaped resection of the distal IT band centered over the lateral epicondyle was performed   This was 1 5 x 1 5 in   After resection of this and resecting the underlying bursal tissue it was seen that throughout flexion and extension there is no longer any IT band impingement over the lateral epicondyle   This completed the open distal IT band resection  Care was taken to only dissect and remove the superficial IT band layer, and not to resect the deeper tissues and capsule       The skin was cleansed with sterile saline and dried before Steri-Strips were applied  Finally, a sterile dressing was secured by Webril and an Ace wrap       The portals were closed with 4-0 monocryl interrupted and the lateral incision was closed with 2-0 vicryl and 4-0 vicryl running layer for skin          I was present for the entire procedure, A qualified resident physician was not available and A physician assistant was required during the procedure for retraction tissue handling,dissection and suturing    Patient Disposition:  PACU         SIGNATURE: Stephen Dillard DO  DATE: October 25, 2022  TIME: 2:06 PM

## 2022-10-25 NOTE — DISCHARGE INSTRUCTIONS
POSTOPERATIVE INSTRUCTIONS following KNEE SURGERY    MEDICATIONS:  Resume all home medications unless otherwise instructed by your surgeon  Pain Medication:  Oxycodone 5 mg, 1 tablet every 4 hours as needed  If you were given a regional anesthetic (nerve block), please begin taking the pain medication as soon as you get home, even if you have minimal or no pain  DO NOT WAIT FOR THE NERVE BLOCK TO WEAR OFF  Possible side effects include nausea, constipation, and urinary retention  If you experience these side effects, please call our office for assistance  Pain med refills are authorized only during office hours (8am-4pm, Mon-Fri)  Anti-Inflammatory:  Ibuprofen 600 mg, 1 tablet every 8 hours for 4 weeks and Tylenol 325 mg, 1-2 tablets every 6 hours for 4 weeks  Take with food  Stop if you experience nausea, reflux, or stomach pain  Nausea Medication:  None  Fill prescription ONLY if you expericnce severe nausea  Blood Clot Prevention:  Not Necessary  Pump your foot up and down 20 times per hour while you are less mobile  WOUND CARE:  Keep the dressing clean and dry  Light drainage may occur the first 2 days postop  You may remove the dressings and get the incision wet in the shower 72 hours after surgery  DO NOT remove steri-strips or sutures  DO NOT immerse the incision under water  Carefully pat the incision dry  If there is wound drainage, re-apply a fresh dry gauze dressing  Please call our office ([phone]) if you experience either of the following:  Sudden increase in swelling, redness, or warmth at the surgical site  Excessive incisional drainage that persists beyond the 3rd day after surgery  Oral temperature greater than 101 degrees, not relieved with Tylenol  Shortness of breath, chest pain, nausea, or any other concerning symptoms    SWELLING CONTROL:  Cold Therapy: The cold therapy device may be used either continuously or only as needed, according to your preference    Do not let the pad directly touch your skin  Alternatively, apply ice (20 min on, 20 min off) as often as you feel is necessary  Elevation:  Elevate the entire leg above heart level  Place pillows under your ankle to keep your knee straight  Compression:  Apply ACE wraps or a thigh-length compression stocking as needed  RANGE OF MOTION:  You are allowed FULL RANGE OF MOTION as tolerated  IMMOBILIZATION:  None  You are allowed full range of motion as tolerated  ACTIVITY:   BEAR FULL WEIGHT AS TOLERATED on the operative leg  Use crutches to assist only as needed  Using Crutches on Stairs:  Going up, lead with your "good" (nonoperative) leg  Going down, lead with your "bad" (operative) leg  Use a hand rail when available  Knee Extension:  Place a rolled towel or pillow under your ankle for 20-30 minutes 3-5 times per day  This will help to maintain full knee extension  Quad Sets:  Sit or lie with your knee straight  Tighten your quadriceps (front thigh) muscle  Hold for 3 seconds, then relax  Repeat 20 times per hour while awake  PHYSICAL THERAPY:  Begin therapy 3 TO 5 DAYS AFTER SURGERY  You were given a prescription for therapy at your preoperative office visit  If you do not have physical therapy scheduled yet, please call our office for assistance  FOLLOW-UP APPOINTMENT:  7-10 days after surgery with:    MAINOR Tovar [address] [place] [address], [address] [place] [address], [facility], [facility]  [phone] ([facility])  [phone] (After-Hours)

## 2022-10-25 NOTE — ANESTHESIA PREPROCEDURE EVALUATION
Procedure:  ARTHROSCOPY KNEE- possible lateral menisectomy vs lysis of adhesions vs foreign body removal (Right Knee)  RELEASE ILIOTIBIAL BAND (Right Hip)    Relevant Problems   ANESTHESIA   (+) PONV (postoperative nausea and vomiting)      GI/HEPATIC   (+) Gastroesophageal reflux disease      Nervous and Auditory   (+) Common peroneal neuropathy, right   (+) Right peroneal nerve injury      Musculoskeletal and Integument   (+) Closed displaced fracture of middle phalanx of right ring finger   (+) Sprain of anterior talofibular ligament of left ankle   (+) Tenosynovitis of right wrist      Other   (+) Aftercare following surgery   (+) Ankle instability, left   (+) Cyclic citrullinated peptide (CCP) antibody positive        Physical Exam    Airway    Mallampati score: II  TM Distance: >3 FB  Neck ROM: full     Dental   No notable dental hx     Cardiovascular  Cardiovascular exam normal    Pulmonary  Pulmonary exam normal     Other Findings        Anesthesia Plan  ASA Score- 2     Anesthesia Type- general with ASA Monitors  Additional Monitors:   Airway Plan: ETT and LMA  Plan Factors-Exercise tolerance (METS): >4 METS  Chart reviewed  Existing labs reviewed  Patient is not a current smoker  Patient not instructed to abstain from smoking on day of procedure  Patient did not smoke on day of surgery  Induction- intravenous  Postoperative Plan-     Informed Consent- Anesthetic plan and risks discussed with patient  I personally reviewed this patient with the CRNA  Discussed and agreed on the Anesthesia Plan with the CRNA  Jose Luis Monsalve

## 2022-10-25 NOTE — H&P
Ortho Sports Medicine Knee Visit     Assesment:   31M s/p surgical arthroscopy of the right knee with removal of lateral mensicus repair suture and anchor with lysis of adhesions of popliteal hiatus, DOS: 3/1/22 who was doing well until new recent injury, concerned about new lateral meniscus tear even though MRI arthrogram showed no obvious lateral meniscal tear  S/p right peroneal nerve decompression 8/31/22 by Dr Selina Gardner at [facility], who he has follow up scheduled next month  Plan:    Conservative treatment:    Ice to knee for 20 minutes at least 1-2 times daily  PT for ROM/strengthening to knee, hip and core  OTC NSAIDS prn for pain  Tylenol for pain  At this point surgical intervention is recommended, since his symptoms have persisted  Avoid any deep squats, pivoting or twisting motions    Injection:    No Injection planned at this time  Surgery: All of the risks and benefits of operative treatment were explained to the patient, as well as the risks and benefits of any alternative treatment options, including nonoperative care  The risks of surgical treatement include, but are not limited to, infection, bleeding, blood clot, neurovascular damage, need for further surgery, continued pain, cardiovascular risk, and anesthesia risk  The patient understood this and elects to proceed forward with surgical intervention  We will proceed forward with diagnostic surgical arthroscopy of the right knee with possible lateral meniscectomy, lysis of adhesions, removal of prior sutures/anchors as indicated during the diagnostic scope  Also discussion and consent obtained for resection of distal IT band over the lateral femur epicondyle depending on results of arthroscopy  History of Present Illness: The patient is returns for follow up of his right knee  S/p surgical arthroscopy of the right knee with removal of lateral mensicus repair suture and anchor with lysis of adhesions of popliteal hiatus, DOS: 3/1/22 with persistent pain and dysfunction since recent injury 4 weeks ago  The pain has not improved  Pain is located lateral    Pain is improved by rest, ice and NSAIDS  Pain is aggravated by stairs, squatting, weight bearing and pivoting on a planted foot  Notes instability in right knee  Symptoms include clicking, catching and popping  The patient has tried rest, ice, NSAIDS and activity modification  I have reviewed the past medical, surgical, social and family history, medications and allergies as documented in the EMR  Review of systems: ROS is negative other than that noted in the HPI  Constitutional: Negative for fatigue and fever     Cardiovascular: Negative for chest pain  Pulmonary: negative for shortness of breath    PMH/PSH:  Past Medical History:   Diagnosis Date   • COVID 01/09/2022    Fever,fatigue   • GERD (gastroesophageal reflux disease)     rarely   • PONV (postoperative nausea and vomiting)     3/11/21 Pt states "deals with slight nausea after anesthesia "     Past Surgical History:   Procedure Laterality Date   • ANKLE SURGERY Bilateral    • ARTHROSCOPY KNEE Right 3/1/2022    Procedure: RIGHT KNEE ARTHROSCOPY, ARTHROSCOPIC REMOVAL OF HARDWARE;  Surgeon: Rock Marcella DO;  Location: [address] [place] [address];  Service: Orthopedics   • CARPAL TUNNEL RELEASE Right    • FL INJECTION RIGHT KNEE (ARTHROGRAM)  7/21/2021   • FL INJECTION RIGHT KNEE (ARTHROGRAM)  8/24/2022   • KNEE ARTHROSCOPY Right    • KNEE SURGERY Right    • [place] KNEE SCOPE,MED OR LAT MENIS REPAIR Right 3/16/2021    Procedure: ARTHROSCOPIC LATERAL MENISCUS REPAIR;  Surgeon: Rock Marcella DO;  Location: [address] [place] [address];  Service: Orthopedics   • [place] REVISE MEDIAN N/CARPAL TUNNEL SURG Right 9/20/2018    Procedure: RELEASE CARPAL TUNNEL;  Surgeon: Lucero Padilla MD;  Location: [facility] OR;  Service: Orthopedics   • SYNOVECTOMY Right 9/20/2018    Procedure: TENOSYNOVECTOMY Flexor digitorum superficialis to index, long, ring and small finger; Flexor digitorum profundus to index, long, ring and small finger; Flexor pollicis longus;  Surgeon: Nunu Dennis MD;  Location: [facility] OR;  Service: Orthopedics   • TONSILLECTOMY AND ADENOIDECTOMY          Physical Exam:    Blood pressure 119/68, pulse 70, temperature (!) 97 °F (36 1 °C), temperature source Temporal, resp  rate 18, height 5' 11" (1 803 m), weight 87 5 kg (193 lb), SpO2 96 %  General/Constitutional: NAD, well developed, well nourished  HENT: Normocephalic, atraumatic  CV: Intact distal pulses, regular rate  Resp: No respiratory distress or labored breathing  Lymphatic: No lymphadenopathy palpated  Neuro: Alert and Oriented x 3, no focal deficits  Psych: Normal mood, normal affect, normal judgement, normal behavior  Skin: Warm, dry, no rashes, no erythema       Knee Exam (focused): RIGHT LEFT   ROM:   0-130 0-130   Palpation: Effusion mild negative     MJL tenderness Negative Negative     LJL tenderness Positive Negative   Instability: Varus stable stable     Valgus stable stable   Special Tests: Lachman Negative Negative     Posterior drawer Negative Negative     Anterior drawer Negative Negative     Pivot shift not tested not tested     Dial not tested not tested   Patella: Palpation no tenderness no tenderness     Mobility 1/4 1/4     Apprehension Negative Negative   Other: Single leg 1/4 squat not tested not tested    TTP over IT band over the lateral femoral epicondyle  LE NV Exam: +2 DP/PT pulses bilaterally  Sensation intact to light touch L2-S1 bilaterally    No calf tenderness to palpation bilaterally      Knee Imaging    MRI of the right knee were reviewed, which demonstrate no discrete meniscus tear, small bakers cyst, mild cartilage thinning and fissuring medial facet of patellar articular cartilage     I have reviewed the radiology report and agree with their impression

## 2022-10-25 NOTE — ANESTHESIA POSTPROCEDURE EVALUATION
Post-Op Assessment Note    CV Status:  Stable  Pain Score: 0    Pain management: adequate     Mental Status:  Alert   Hydration Status:  Stable   PONV Controlled:  Controlled   Airway Patency:  Patent      Post Op Vitals Reviewed: Yes      Staff: CRNA         No complications documented      BP   129/84   Temp  97 8   Pulse  70   Resp   20   SpO2   100

## 2022-10-25 NOTE — NURSING NOTE
Stated that nausea is resolving  Denies need for pain medication  IV fluids continue  Positive right pedal pulse  Right foot warm to touch with brisk capillary refill

## 2022-10-28 ENCOUNTER — EVALUATION (OUTPATIENT)
Dept: PHYSICAL THERAPY | Facility: MEDICAL CENTER | Age: 32
End: 2022-10-28
Payer: COMMERCIAL

## 2022-10-28 DIAGNOSIS — Z98.890 S/P ARTHROSCOPIC SURGERY OF RIGHT KNEE: ICD-10-CM

## 2022-10-28 PROCEDURE — 97112 NEUROMUSCULAR REEDUCATION: CPT | Performed by: PHYSICAL THERAPIST

## 2022-10-28 PROCEDURE — 97161 PT EVAL LOW COMPLEX 20 MIN: CPT | Performed by: PHYSICAL THERAPIST

## 2022-10-28 NOTE — PROGRESS NOTES
PT Evaluation     Today's date: 10/28/2022  Patient name: Diogenes Drake  : 1990  MRN: [id number]  Referring provider: Greg Whiting,*  Dx:   Encounter Diagnosis     ICD-10-CM    1  S/P arthroscopic surgery of right knee  Z98 890 Ambulatory Referral to Physical Therapy       Start Time:   Stop Time: 0  Total time in clinic (min): 38 minutes    Assessment  Assessment details: Pt is a 33 y o male who presents s/p ITB release surgery completed 10/25/2022  Pt presents today with increased R knee pain, decreased R knee ROM, decreased R LE strength and decreased activity tolerance  These impairments limit the patient from participating in ambulation at PLOF,decreased tolerance to complete stairs and squatting and decreased ability to participate in work related activities as a   I believe this patient is a good candidate for and will benefit from skilled physical therapy for manual therapy to the R knee, R knee ROM exercises, R LE strengthening exercises and mechanics training to improve symptoms and assist the patient to return to PLOF  Thank you for the opportunity to participate in [facility]      Positive Prognostic Indicators: desire to improve    Negative Prognostic Indicators: co-payment   Impairments: abnormal gait, abnormal muscle tone, abnormal or restricted ROM, abnormal movement, activity intolerance, impaired physical strength, lacks appropriate home exercise program and pain with function    Symptom irritability: lowUnderstanding of Dx/Px/POC: good   Prognosis: good    Goals  STGs: 4 weeks  1) Pt will have SPR decrease of 2 units at worst  2) pt will have improved R knee extension strength to 5/5  3) pt will have improved foto score of 10 points    LTGs: 8 weeks  1) pt will be independent with HEP by D/C  2) pt will be independent with symptom management by D/C  3) pt will subjectively report no more than 2/10 pain with squatting and lifting in order to demonstrate improved activity tolerance by DC  Plan  Patient would benefit from: skilled physical therapy  Planned modality interventions: cryotherapy and thermotherapy: hydrocollator packs  Planned therapy interventions: manual therapy, joint mobilization, neuromuscular re-education, postural training, patient education, strengthening, stretching, therapeutic exercise, therapeutic activities, gait training and home exercise program  Frequency: 1x week  Duration in weeks: 12  Plan of Care beginning date: 10/28/2022  Plan of Care expiration date: 1/20/2023  Treatment plan discussed with: patient        Subjective Evaluation    History of Present Illness  Mechanism of injury: Subjective Comments: 3/3/21 pt had meniscus surgery  Caused nerve damage  Had surgery to correct this  Pt reports that he had a surgery to trim back ITB  Pt reports that the more active he is the more his knee bothers him  Pt has been using ice and OTC pain meds with benefit  Pt reports minimal nerve pain currently  Pt denies bucking or knee giving out  Pain   Rest: 4/10   Best: 4/10   Worst: 6-7/10    Relieving Factors: ice, OTC    Exacerbating Factors: being on it, walking    Sleeping: increased difficulty but overall doing well  Home Set-up: chair transfers are not too bad  Stairs aren't too bad, completing asymmetrically  ADLs: independent    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    Previous Treatment: previous PT    Goals:  Get back to normal   Being able to squat and lift for work  Objective     Observations     Right Knee   Positive for incision  Additional Observation Details  Post operative dressings in place  Incision/ports look clean dry and intact  No redness      Palpation     Additional Palpation Details  General tenderness    Active Range of Motion   Left Knee   Flexion: 152 degrees   Extension: 0 degrees     Right Knee   Flexion: 125 degrees with pain  Extension: 0 degrees     Passive Range of Motion     Right Knee   Flexion: 125 degrees   Extension: 0 degrees     Strength/Myotome Testing     Left Hip   Planes of Motion   Flexion: 5  Adduction: 5    Right Hip   Planes of Motion   Flexion: 4+  Adduction: 5    Left Knee   Flexion: 5  Extension: 5    Right Knee   Flexion: 4+  Extension: 4+    Left Ankle/Foot   Dorsiflexion: 5  Plantar flexion: 5    Right Ankle/Foot   Dorsiflexion: 5  Plantar flexion: 5    Ambulation     Ambulation: Level Surfaces   Ambulation without assistive device: independent    Observational Gait   Gait: asymmetric   Decreased walking speed and stride length                Precautions: Hx common peroneal injury, post op ITB release      Manuals 10/28            R knee PROM                                                    Neuro Re-Ed             QS 5" x10            SLR x10            SAQ             LAQ x10 3"                                                   Ther Ex             bike             Heel slides x10            Heel raise x10            HS curls             marching                                                    Ther Activity                                       Gait Training                                       Modalities

## 2022-11-03 ENCOUNTER — OFFICE VISIT (OUTPATIENT)
Dept: PHYSICAL THERAPY | Facility: MEDICAL CENTER | Age: 32
End: 2022-11-03

## 2022-11-03 DIAGNOSIS — Z98.890 S/P ARTHROSCOPIC SURGERY OF RIGHT KNEE: Primary | ICD-10-CM

## 2022-11-03 NOTE — PROGRESS NOTES
Daily Note     Today's date: 11/3/2022  Patient name: Zena Curtis  : 1990  MRN: [id number]  Referring provider: Bryan Keith,*  Dx:   Encounter Diagnosis     ICD-10-CM    1  S/P arthroscopic surgery of right knee  Z98 [id number]        Start Time: 56  Stop Time: 1301  Total time in clinic (min): 31 minutes    Subjective: pt notes that in the morning his leg feels the best and increases with swelling as the day goes on  Other than this he is doing well  He has no issues with HEP  Objective: See treatment diary below      Assessment: Tolerated treatment well  Pt completed all exercises well with minimal complaints of symptoms  Gentle progression tolerated well with slight increase in soreness  Pt educated to continue icing knee  We will continue to progress as tolerated  Patient would benefit from continued PT      Plan: Continue per plan of care  Precautions: Hx common peroneal injury, post op ITB release      Manuals 10/28 11/3           R knee PROM  RK                                                  Neuro Re-Ed             QS 5" x10 5" x10           SLR x10 2x10           SAQ  2x10 3"           LAQ x10 3" x20 3"                                                  Ther Ex             bike             Heel slides x10            Heel raise x10 x20           HS curls  x20           marching  x20 ea   alt           Side stepping  3 laps                                     Ther Activity                                       Gait Training                                       Modalities

## 2022-11-07 ENCOUNTER — OFFICE VISIT (OUTPATIENT)
Dept: OBGYN CLINIC | Facility: MEDICAL CENTER | Age: 32
End: 2022-11-07

## 2022-11-07 VITALS
WEIGHT: 199 LBS | BODY MASS INDEX: 27.86 KG/M2 | SYSTOLIC BLOOD PRESSURE: 126 MMHG | DIASTOLIC BLOOD PRESSURE: 82 MMHG | HEIGHT: 71 IN | HEART RATE: 83 BPM

## 2022-11-07 DIAGNOSIS — Z98.890 S/P ARTHROSCOPIC SURGERY OF RIGHT KNEE: Primary | ICD-10-CM

## 2022-11-07 NOTE — PROGRESS NOTES
Knee Post Operative Visit     Assesment:     Surgical Arthroscopy of the right knee with minimal inner margin partial lateral meniscectomy and open IT band resection 10/25/22 overall doing well    Plan:    Post-Operative treatment:    Ice to knee for 20 minutes at least 1-2 times daily  Continue PT for ROM/strengthening to knee, hip and core  Avoid overdoing it  Continue to daily ice and elevate to control swelling  OTC NSAIDS prn for pain  Call for note when he would like return prior to next appt  Imaging:    No imaging was available for review today  Weight bearing:  as tolerated     ROM:  Full    Brace:  No brace needed    Follow up:   5 weeks     Patient was advised that if they have any fevers, chills, chest pain, shortness of breath, redness or drainage from the incision, please let our office know immediately  History of Present Illness: The patient is a 33 y o  male who is being evaluated post operatively 2 weeks  status post arthroscopic lateral meniscectomy with IT band resection of right knee 10/25/22  Since the prior visit, He reports  improvement  Pain is well controlled  The patient is using ice to control swelling  They have started physical therapy  Knee swells up after therapy lateral knee distal IT band  The patient has been ambulating without crutches  The patient has been ambulating without a brace  The patient denies any fevers, chills, calf pain, chest pain/shortness of breath, redness or drainage from the incision  I have reviewed the past medical, surgical, social and family history, medications and allergies as documented in the EMR  Review of systems: ROS is negative other than that noted in the HPI  Constitutional: Negative for fatigue and fever  Physical Exam:    Blood pressure 126/82, pulse 83, height 5' 11" (1 803 m), weight 90 3 kg (199 lb)      General/Constitutional: NAD, well developed, well nourished  HENT: Normocephalic, atraumatic  CV: Intact distal pulses, regular rate  Resp: No respiratory distress or labored breathing  Lymphatic: No lymphadenopathy palpated  Neuro: Alert and Oriented x 3, no focal deficits  Psych: Normal mood, normal affect, normal judgement, normal behavior  Skin: Warm, dry, no rashes, no erythema       Knee Exam (focused):                   RIGHT LEFT   ROM:   0-120 0-130   Palpation: Effusion moderate negative     MJL tenderness Negative Negative     LJL tenderness Positive distal IT band Negative   Instability: Varus stable stable     Valgus stable stable   Special Tests: Lachman Negative Negative     Posterior drawer Negative Negative     Anterior drawer Negative Negative     Pivot shift not tested not tested     Dial not tested not tested   Patella: Palpation no tenderness no tenderness     Mobility 1/4 1/4     Apprehension Negative Negative   Other: Single leg 1/4 squat not tested not tested      Incisions show no erythema, no drainage    LE NV Exam: +2 DP/PT pulses bilaterally  Sensation intact to light touch L2-S1 bilaterally     Bilateral hip ROM demonstrates no pain actively or passively    No calf tenderness to palpation bilaterally      Scribe Attestation    I,:  Mercy Steward am acting as a scribe while in the presence of the attending physician :       I,:  Brianna Perdue DO personally performed the services described in this documentation    as scribed in my presence :

## 2022-11-07 NOTE — LETTER
November 7, 2022     Patient: Johny Lewis  YOB: 1990  Date of Visit: 11/7/2022      To Whom it May Concern:    Johny Lewis is under my professional care  Wendy Martini was seen in my office on 11/7/2022  Wendy Martini will remain out of work until next evaluation in 5 weeks  If you have any questions or concerns, please don't hesitate to call           Sincerely,          Rory Lees,         CC: No Recipients

## 2022-11-08 ENCOUNTER — APPOINTMENT (OUTPATIENT)
Dept: PHYSICAL THERAPY | Facility: MEDICAL CENTER | Age: 32
End: 2022-11-08

## 2022-11-15 ENCOUNTER — OFFICE VISIT (OUTPATIENT)
Dept: PHYSICAL THERAPY | Facility: MEDICAL CENTER | Age: 32
End: 2022-11-15

## 2022-11-15 DIAGNOSIS — Z98.890 S/P ARTHROSCOPIC SURGERY OF RIGHT KNEE: Primary | ICD-10-CM

## 2022-11-15 NOTE — PROGRESS NOTES
Daily Note     Today's date: 11/15/2022  Patient name: Hortensia Thompson  : 1990  MRN: [id number]  Referring provider: Kellie Larson,*  Dx:   Encounter Diagnosis     ICD-10-CM    1  S/P arthroscopic surgery of right knee  Z98 [id number]        Start Time:   Stop Time:   Total time in clinic (min): 30 minutes    Subjective: pt reports that his knee is doing well  He reports minimal pain except with certain motions  however he notes increased swelling of the lateral knee  He reports using heat, ice, elevation and compression with minimal improvement  Objective: See treatment diary below      Assessment: Tolerated treatment well  Minimal progression occurred this session secondary to increased lateral knee swelling  Pt held on lateral exercises  Pt educated to continue with ice frequently at home and to continue with compression sleeve and elevation to improve swelling  Pt reports that he is returning to work on Monday  Pt was highly encouraged to monitor how he is moving to avoid irritating his knee  Pt also reported that he will be icing his knee frequently at work  We will continue to monitor and progress as tolerated  Patient would benefit from continued PT      Plan: Continue per plan of care  Precautions: Hx common peroneal injury, post op ITB release      Manuals 10/28 11/3 11/15          R knee PROM  RK RK                                                 Neuro Re-Ed             QS 5" x10 5" x10 5"x10          SLR x10 2x10 2x10#2          SAQ  2x10 3" 2x10 3"#2          LAQ x10 3" x20 3" x20 3" #2                                                 Ther Ex             bike   5'          Heel slides x10  hep          Heel raise x10 x20 hep          HS curls  x20 x20 #2          marching  x20 ea   alt           Side stepping  3 laps hold          Step downs   ecc 4" x10                       Ther Activity                                       Gait Training Modalities

## 2022-11-22 ENCOUNTER — APPOINTMENT (OUTPATIENT)
Dept: PHYSICAL THERAPY | Facility: MEDICAL CENTER | Age: 32
End: 2022-11-22

## 2022-11-29 ENCOUNTER — APPOINTMENT (OUTPATIENT)
Dept: PHYSICAL THERAPY | Facility: MEDICAL CENTER | Age: 32
End: 2022-11-29

## 2022-12-12 ENCOUNTER — OFFICE VISIT (OUTPATIENT)
Dept: OBGYN CLINIC | Facility: MEDICAL CENTER | Age: 32
End: 2022-12-12

## 2022-12-12 ENCOUNTER — TELEPHONE (OUTPATIENT)
Dept: OBGYN CLINIC | Facility: HOSPITAL | Age: 32
End: 2022-12-12

## 2022-12-12 VITALS
BODY MASS INDEX: 28 KG/M2 | DIASTOLIC BLOOD PRESSURE: 99 MMHG | HEART RATE: 85 BPM | WEIGHT: 200 LBS | SYSTOLIC BLOOD PRESSURE: 143 MMHG | HEIGHT: 71 IN

## 2022-12-12 DIAGNOSIS — R22.41 LOCALIZED SWELLING OF RIGHT LOWER EXTREMITY: ICD-10-CM

## 2022-12-12 DIAGNOSIS — Z98.890 S/P ARTHROSCOPIC SURGERY OF RIGHT KNEE: Primary | ICD-10-CM

## 2022-12-12 NOTE — PROGRESS NOTES
Knee Post Operative Visit     Assesment:   Surgical Arthroscopy of the right knee with minimal inner margin partial lateral meniscectomy and open IT band resection 10/25/22 overall doing well with continued swelling distal IT band         Plan:    Post-Operative treatment:    Ice to knee for 20 minutes at least 1-2 times daily  Discussed to continue to see if fluid gradually goes down vs doing US guided aspiration of localized area of swelling IT band  Decided to see if the fluid resolves  Recommended knee high compression stockings     Follow up:   4 weeks    Patient was advised that if they have any fevers, chills, chest pain, shortness of breath, redness or drainage from the incision, please let our office know immediately  History of Present Illness: The patient is a 33 y o  male who is being evaluated post operatively 6 weeks  status post arthroscopic lateral meniscectomy with IT band resection of right knee 10/25/22         Since the prior visit, He reports improvement  Pain is well controlled  The patient is using ice to control swelling  Physical therapy is going well  Only issue continues to be swelling distal IT band  He states he does experience more swelling at end of his work day  The patient has been ambulating without crutches  The patient has been ambulating without a brace  The patient denies any fevers, chills, calf pain, chest pain/shortness of breath, redness or drainage from the incision  I have reviewed the past medical, surgical, social and family history, medications and allergies as documented in the EMR  Review of systems: ROS is negative other than that noted in the HPI  Constitutional: Negative for fatigue and fever  Physical Exam:    Blood pressure 143/99, pulse 85, height 5' 11" (1 803 m), weight 90 7 kg (200 lb)      General/Constitutional: NAD, well developed, well nourished  HENT: Normocephalic, atraumatic  CV: Intact distal pulses, regular rate  Resp: No respiratory distress or labored breathing  Lymphatic: No lymphadenopathy palpated  Neuro: Alert and Oriented x 3, no focal deficits  Psych: Normal mood, normal affect, normal judgement, normal behavior  Skin: Warm, dry, no rashes, no erythema       Knee Exam (focused):                   RIGHT LEFT   ROM:   0-130 0-130   Palpation: Effusion mild negative     MJL tenderness Negative Negative     LJL tenderness Mild distal IT band Negative   Instability: Varus stable stable     Valgus stable stable   Special Tests: Lachman Negative Negative     Posterior drawer Negative Negative     Anterior drawer Negative Negative     Pivot shift not tested not tested     Dial not tested not tested   Patella: Palpation no tenderness no tenderness     Mobility 1/4 1/4     Apprehension Negative Negative   Other: Single leg 1/4 squat not tested not tested      Incisions show no erythema, no drainage    LE NV Exam: +2 DP/PT pulses bilaterally  Sensation intact to light touch L2-S1 bilaterally     Bilateral hip ROM demonstrates no pain actively or passively    No calf tenderness to palpation bilaterally        Scribe Attestation    I,:  Denise Wright am acting as a scribe while in the presence of the attending physician :       I,:  Viktoria Perdue DO personally performed the services described in this documentation    as scribed in my presence :

## 2022-12-12 NOTE — LETTER
December 12, 2022     Patient: Travis Reddy  YOB: 1990  Date of Visit: 12/12/2022      To Whom it May Concern:    Travis Reddy is under my professional care  Randy Simon underwent surgery to his right knee on 10-22-22 by me, which was a distal IT band resection with an open incision  There was also a diagnostic arthroscopy performed at this surgery, which did not show any meniscus tears or further damage inside his knee joint  His original knee injury in March of 2021, consisting of a lateral meniscus bucket handle tear, was treated with lateral meniscus repair surgery on 3-16-21  A complication resulting from his March 2021 surgery was 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  This resulted in needing an open peroneal nerve decompression with Dr Paty Viramontes at the [facility], which resolved the nerve related symptoms of numbness/tingling/pain  On 3/1/22 he underwent another procedure with me, in which I performed an arthroscopic lysis of adhesions/removal of scar tissue around his lateral meniscus, as well as removal of prior hardware from the previously placed meniscus repair device  This resolved his lateral joint symptoms that were persisting, but his meniscus was well healed during this surgery  His last surgery was performed on 10/25/2022  He developed severe lateral joint line pain suddenly after doing very well since his March 2022 surgery  A repeat MRI did not show a lateral meniscus tear, and his pain was clinically located over the distal IT band, and therefore a distal IT band resection was planned and performed  A diagnostic arthroscopy did not show any lateral meniscus tears or scarring or hardware that needed removal   I feel the surgery was directly related to his initial work injury in March 2021    The initial surgery and all the subsequent surgeries that were done lead to abnormal mechanics and eventually acute IT band syndrome was severe enough to necessitate surgery  If he had not had the initial work injury and  did not have altered mechanics and muscle strength/range of motion, and tissue damage from prior surgeries, he would not have developed the IT band syndrome  The multiple complications and subsequent surgeries contributed as well, but the initial injury at work was ultimately responsible for every surgery that has since been needed  If you have any questions or concerns, please don't hesitate to call           Sincerely,          Carlos Enrique Huggins,         CC: No Recipients

## 2022-12-12 NOTE — TELEPHONE ENCOUNTER
Caller: Patient    Doctor: Lupillo Palencia    Reason for call: patient calling back, he states we called him after his appt today    No notes in chart - Please advise    Call back#: [phone]

## 2023-01-16 ENCOUNTER — OFFICE VISIT (OUTPATIENT)
Dept: OBGYN CLINIC | Facility: MEDICAL CENTER | Age: 33
End: 2023-01-16

## 2023-01-16 VITALS
HEART RATE: 92 BPM | HEIGHT: 71 IN | WEIGHT: 200 LBS | BODY MASS INDEX: 28 KG/M2 | DIASTOLIC BLOOD PRESSURE: 84 MMHG | SYSTOLIC BLOOD PRESSURE: 133 MMHG

## 2023-01-16 DIAGNOSIS — Z98.890 S/P ARTHROSCOPIC SURGERY OF RIGHT KNEE: Primary | ICD-10-CM

## 2023-01-16 DIAGNOSIS — M76.31 IT BAND SYNDROME, RIGHT: ICD-10-CM

## 2023-01-16 DIAGNOSIS — Z98.890 S/P LATERAL MENISCUS REPAIR OF RIGHT KNEE: ICD-10-CM

## 2023-01-16 DIAGNOSIS — R22.41 LOCALIZED SWELLING OF RIGHT LOWER EXTREMITY: ICD-10-CM

## 2023-01-16 NOTE — PROGRESS NOTES
Ortho Sports Medicine Knee Visit     Assesment:   Surgical Arthroscopy of the right knee with minimal inner margin partial lateral meniscectomy and open IT band resection 10/25/22     Plan:    Conservative treatment:  Physical exam performed  Doing very well  Normal exam  Continues to have some mild lateral knee swelling  We discussed possible aspiration if needed in the future  Pt advised to continue use of compression garements  The patient can follow up as needed and is welcome to return at any point with any new or old issue  Imaging:    No imaging was available for review today  Injection:    No Injection planned at this time  Surgery:     No surgery is recommended at this point, continue with conservative treatment plan as noted  History of Present Illness: The patient is returns for follow up of Surgical Arthroscopy of the right knee with minimal inner margin partial lateral meniscectomy and open IT band resection 10/25/22 overall doing well  Since the prior visit, He reports significant improvement  He does report some mild pain that does not linger when he makes a right pivot turn with his leg  He states that he has much less swelling on the outside of his knee  Pt denies pain and has been discharged from Physical Therapy           I have reviewed the past medical, surgical, social and family history, medications and allergies as documented in the EMR  Review of systems: ROS is negative other than that noted in the HPI  Constitutional: Negative for fatigue and fever     Cardiovascular: Negative for chest pain  Pulmonary: negative for shortness of breath    PMH/PSH:  Past Medical History:   Diagnosis Date   • COVID 01/09/2022    Fever,fatigue   • GERD (gastroesophageal reflux disease)     rarely   • PONV (postoperative nausea and vomiting)     3/11/21 Pt states "deals with slight nausea after anesthesia "     Past Surgical History:   Procedure Laterality Date   • ANKLE SURGERY Bilateral    • ARTHROSCOPY KNEE Right 3/1/2022    Procedure: RIGHT KNEE ARTHROSCOPY, ARTHROSCOPIC REMOVAL OF HARDWARE;  Surgeon: Ian Avila DO;  Location: [address] [place] [address] OR;  Service: Orthopedics   • CARPAL TUNNEL RELEASE Right    • FL INJECTION RIGHT KNEE (ARTHROGRAM)  7/21/2021   • FL INJECTION RIGHT KNEE (ARTHROGRAM)  8/24/2022   • KNEE ARTHROSCOPY Right    • KNEE SURGERY Right    • OR ARTHROSCOPY KNEE W/MENISCUS RPR MEDIAL/LATERAL Right 3/16/2021    Procedure: ARTHROSCOPIC LATERAL MENISCUS REPAIR;  Surgeon: Ian Avila DO;  Location: [address] [place] [address] OR;  Service: Orthopedics   • OR ARTHRS KNE SURG W/MENISCECTOMY MED/LAT W/SHVG Right 10/25/2022    Procedure: Patsteffanyia Gipuratti;  Surgeon: Ian Avila DO;  Location: [address] [place] [address] OR;  Service: Orthopedics   • OR FASCIOTOMY ILIOTIBIAL OPEN Right 10/25/2022    Procedure: RELEASE ILIOTIBIAL BAND;  Surgeon: Ian Avila DO;  Location: [address] [place] [address] OR;  Service: Orthopedics   • OR NEUROPLASTY &/TRANSPOS MEDIAN NRV CARPAL Sudie Saris Right 9/20/2018    Procedure: RELEASE CARPAL TUNNEL;  Surgeon: Halle Hotl MD;  Location:  MAIN OR;  Service: Orthopedics   • SYNOVECTOMY Right 9/20/2018    Procedure: TENOSYNOVECTOMY Flexor digitorum superficialis to index, long, ring and small finger; Flexor digitorum profundus to index, long, ring and small finger; Flexor pollicis longus;  Surgeon: Halle Holt MD;  Location:  MAIN OR;  Service: Orthopedics   • TONSILLECTOMY AND ADENOIDECTOMY          Physical Exam:    Blood pressure 133/84, pulse 92, height 5' 11" (1 803 m), weight 90 7 kg (200 lb)      General/Constitutional: NAD, well developed, well nourished  HENT: Normocephalic, atraumatic  CV: Intact distal pulses, regular rate  Resp: No respiratory distress or labored breathing  Lymphatic: No lymphadenopathy palpated  Neuro: Alert and Oriented x 3, no focal deficits  Psych: Normal mood, normal affect, normal judgement, normal behavior  Skin: Warm, dry, no rashes, no erythema       Knee Exam (focused):                   RIGHT LEFT   ROM:   0-130 0-130   Palpation: Effusion negative negative     MJL tenderness Negative Negative     LJL tenderness Negative Negative   Instability: Varus stable stable     Valgus stable stable   Special Tests: Lachman Negative Negative     Posterior drawer Negative Negative     Anterior drawer Negative Negative     Pivot shift not tested not tested     Dial not tested not tested   Patella: Palpation no tenderness no tenderness     Mobility 1/4 1/4     Apprehension Negative Negative   Other: Single leg 1/4 squat not tested not tested      LE NV Exam: +2 DP/PT pulses bilaterally  Sensation intact to light touch L2-S1 bilaterally    No calf tenderness to palpation bilaterally      Knee Imaging    No imagine reviewed

## 2023-05-02 ENCOUNTER — OFFICE VISIT (OUTPATIENT)
Dept: FAMILY MEDICINE CLINIC | Facility: CLINIC | Age: 33
End: 2023-05-02

## 2023-05-02 VITALS
HEART RATE: 90 BPM | RESPIRATION RATE: 16 BRPM | BODY MASS INDEX: 28.14 KG/M2 | DIASTOLIC BLOOD PRESSURE: 88 MMHG | HEIGHT: 71 IN | SYSTOLIC BLOOD PRESSURE: 126 MMHG | WEIGHT: 201 LBS | OXYGEN SATURATION: 98 % | TEMPERATURE: 98.3 F

## 2023-05-02 DIAGNOSIS — Z13.6 SCREENING FOR CARDIOVASCULAR CONDITION: ICD-10-CM

## 2023-05-02 DIAGNOSIS — Z13.1 SCREENING FOR DIABETES MELLITUS: ICD-10-CM

## 2023-05-02 DIAGNOSIS — R53.83 OTHER FATIGUE: ICD-10-CM

## 2023-05-02 DIAGNOSIS — Z00.00 ANNUAL PHYSICAL EXAM: Primary | ICD-10-CM

## 2023-05-02 DIAGNOSIS — L72.9 SCROTAL CYST: ICD-10-CM

## 2023-05-02 NOTE — PROGRESS NOTES
[address] [facility]    NAME: Dean Chery  AGE: 33 y o  SEX: male  : 1990     DATE: 2023     Assessment and Plan:     Problem List Items Addressed This Visit    None  Visit Diagnoses     Annual physical exam    -  Primary    Meg Brown appears well  He is to continue a healthy, lower carb diet, and regular, low impact exercise  FBW ordered  Screening for diabetes mellitus        Relevant Orders    Comprehensive metabolic panel    Screening for cardiovascular condition        Relevant Orders    Lipid Panel with Direct LDL reflex    Other fatigue        Relevant Orders    CBC and differential    TSH, 3rd generation with Free T4 reflex    BMI 28 0-28 9,adult        Diet and exercise as above  Scrotal cyst        Likely a benign microlith - will though, get US as a precaution  Relevant Orders    US scrotum and testicles          Immunizations and preventive care screenings were discussed with patient today  Appropriate education was printed on patient's after visit summary  Counseling:  Dental Health: discussed importance of regular tooth brushing, flossing, and dental visits  · Exercise: the importance of regular exercise/physical activity was discussed  Recommend exercise 3-5 times per week for at least 30 minutes  BMI Counseling: Body mass index is 28 03 kg/m²  The BMI is above normal  Nutrition recommendations include moderation in carbohydrate intake  Exercise recommendations include exercising 3-5 times per week  No pharmacotherapy was ordered  Patient referred to PCP  Rationale for BMI follow-up plan is due to patient being overweight or obese  Depression Screening and Follow-up Plan: Patient was screened for depression during today's encounter  They screened negative with a PHQ-2 score of 0          Return in 1 year (on 2024) for Annual physical      Chief Complaint:     Chief Complaint   Patient presents "with    Physical Exam     Patient is being seen for an annual physical        History of Present Illness:     Adult Annual Physical   Patient here for a comprehensive physical exam  The patient reports no problems  Had right knee arthroscopy with Ortho in 10/2022  Vaccines reviewed - Tdap 2019, and vaccinated against COV-19  Diet and Physical Activity  · Diet/Nutrition: well balanced diet  · Exercise: 3-4 times a week on average  Exercise bike and [employer]  Depression Screening  PHQ-2/9 Depression Screening    Little interest or pleasure in doing things: 0 - not at all  Feeling down, depressed, or hopeless: 0 - not at all  PHQ-2 Score: 0  PHQ-2 Interpretation: Negative depression screen       General Health  · Sleep: sleeps well  · Hearing: normal - bilateral   · Vision: no vision problems  · Dental: regular dental visits   Health  · History of STDs?: no      Review of Systems:     Review of Systems   Constitutional: Negative for activity change  Respiratory: Negative for shortness of breath  Cardiovascular: Negative for chest pain  Gastrointestinal: Negative for abdominal pain and blood in stool  Had some about 6 months ago; resolved  Genitourinary: Negative for decreased urine volume, difficulty urinating and hematuria  \"Lump\" on backside of right testicle x 2 months  No trauma to the area  Musculoskeletal: Positive for arthralgias  Right knee overall is better  Psychiatric/Behavioral: Negative for dysphoric mood  The patient is not nervous/anxious  Past Medical History:     Past Medical History:   Diagnosis Date    COVID 01/09/2022    Fever,fatigue    GERD (gastroesophageal reflux disease)     rarely    PONV (postoperative nausea and vomiting)     3/11/21 Pt states \"deals with slight nausea after anesthesia  \"      Past Surgical History:     Past Surgical History:   Procedure Laterality Date    ANKLE SURGERY Bilateral     ARTHROSCOPY KNEE Right " 3/1/2022    Procedure: RIGHT KNEE ARTHROSCOPY, ARTHROSCOPIC REMOVAL OF HARDWARE;  Surgeon: Nicholas Jamison DO;  Location:  MAIN OR;  Service: Orthopedics    CARPAL TUNNEL RELEASE Right     FL INJECTION RIGHT KNEE (ARTHROGRAM)  7/21/2021    FL INJECTION RIGHT KNEE (ARTHROGRAM)  8/24/2022    KNEE ARTHROSCOPY Right     KNEE SURGERY Right     MD ARTHROSCOPY KNEE W/MENISCUS RPR MEDIAL/LATERAL Right 3/16/2021    Procedure: ARTHROSCOPIC LATERAL MENISCUS REPAIR;  Surgeon: Nicholas Jamison DO;  Location: [address] [place] [address] MAIN OR;  Service: Orthopedics    MD ARTHRS KNE SURG W/MENISCECTOMY MED/LAT W/SHVG Right 10/25/2022    Procedure: Donavan Burch;  Surgeon: Nicholas Jamison DO;  Location:  MAIN OR;  Service: Orthopedics    MD FASCIOTOMY ILIOTIBIAL OPEN Right 10/25/2022    Procedure: RELEASE ILIOTIBIAL BAND;  Surgeon: Nicholas Jamison DO;  Location: [address] [place] [address] OR;  Service: Orthopedics    MD NEUROPLASTY &/TRANSPOS MEDIAN NRV CARPAL Earna Cook Right 9/20/2018    Procedure: RELEASE CARPAL TUNNEL;  Surgeon: Meliza Harp MD;  Location:  MAIN OR;  Service: Orthopedics    SYNOVECTOMY Right 9/20/2018    Procedure: TENOSYNOVECTOMY Flexor digitorum superficialis to index, long, ring and small finger; Flexor digitorum profundus to index, long, ring and small finger; Flexor pollicis longus;  Surgeon: Meliza Harp MD;  Location:  MAIN OR;  Service: Orthopedics    TONSILLECTOMY AND ADENOIDECTOMY        Social History:     Social History     Socioeconomic History    Marital status: /Civil Union     Spouse name: None    Number of children: None    Years of education: None    Highest education level: None   Occupational History    None   Tobacco Use    Smoking status: Never     Passive exposure: Never    Smokeless tobacco: Never    Tobacco comments:     Denies   Vaping Use    Vaping Use: Never used   Substance and Sexual Activity    Alcohol use:  Yes     Alcohol/week: 3 0 standard drinks     Types: 3 Cans of beer per week     Comment: social    Drug use: No     Comment: denies    Sexual activity: Yes     Partners: Female     Birth control/protection: Condom Male   Other Topics Concern    None   Social History Narrative    None     Social Determinants of Health     Financial Resource Strain: Not on file   Food Insecurity: Not on file   Transportation Needs: Not on file   Physical Activity: Not on file   Stress: Not on file   Social Connections: Not on file   Intimate Partner Violence: Not on file   Housing Stability: Not on file      Family History:     Family History   Problem Relation Age of Onset    Arthritis Mother     No Known Problems Father       Current Medications:     Current Outpatient Medications   Medication Sig Dispense Refill    acetaminophen (TYLENOL) 500 mg tablet Take 500 mg by mouth every 6 (six) hours as needed for mild pain      DAILY MULTIPLE VITAMINS PO Take by mouth daily      celecoxib (CeleBREX) 200 mg capsule Take 1 capsule (200 mg total) by mouth daily (Patient not taking: Reported on 1/16/2023) 90 capsule 1    hydrocortisone (ANUSOL-HC) 25 mg suppository Insert 1 suppository (25 mg total) into the rectum 2 (two) times a day for 3 days (Patient not taking: Reported on 10/14/2022) 12 suppository 0    methocarbamol (Robaxin-750) 750 mg tablet Take 1 tablet (750 mg total) by mouth 3 (three) times a day as needed for muscle spasms (Patient not taking: Reported on 8/29/2022) 30 tablet 0    oxyCODONE (ROXICODONE) 5 immediate release tablet Take 1 tablet (5 mg total) by mouth every 4 (four) hours as needed for moderate pain for up to 15 doses Max Daily Amount: 30 mg (Patient not taking: Reported on 12/12/2022) 15 tablet 0     No current facility-administered medications for this visit  Allergies:      Allergies   Allergen Reactions    Ibuprofen Other (See Comments)     Mouth sores        Physical Exam:     /88 (BP Location: Left arm, Patient Position: Sitting, Cuff Size: Large) "Pulse 90   Temp 98 3 °F (36 8 °C) (Tympanic)   Resp 16   Ht 5' 11\" (1 803 m)   Wt 91 2 kg (201 lb)   SpO2 98%   BMI 28 03 kg/m²     Physical Exam  Vitals and nursing note reviewed  Constitutional:       General: He is not in acute distress  Appearance: Normal appearance  He is not ill-appearing, toxic-appearing or diaphoretic  HENT:      Head: Normocephalic and atraumatic  Right Ear: Tympanic membrane, ear canal and external ear normal       Left Ear: Tympanic membrane, ear canal and external ear normal       Mouth/Throat:      Mouth: Mucous membranes are moist       Pharynx: Oropharynx is clear  No oropharyngeal exudate or posterior oropharyngeal erythema  Eyes:      General: No scleral icterus  Conjunctiva/sclera: Conjunctivae normal    Cardiovascular:      Rate and Rhythm: Normal rate and regular rhythm  Heart sounds: Normal heart sounds  No murmur heard  No friction rub  No gallop  Pulmonary:      Effort: Pulmonary effort is normal  No respiratory distress  Breath sounds: Normal breath sounds  No stridor  No wheezing, rhonchi or rales  Abdominal:      General: Abdomen is flat  Bowel sounds are normal  There is no distension  Palpations: Abdomen is soft  There is no mass  Tenderness: There is no abdominal tenderness  There is no guarding or rebound  Hernia: There is no hernia in the left inguinal area or right inguinal area  Genitourinary:     Penis: Normal        Testes: Normal          Right: Mass, tenderness or swelling not present  Right testis is descended  Left: Mass, tenderness or swelling not present  Left testis is descended  Comments: Small, firm spot to outer region of the posterior aspect, mid-right testicle - appears to be external to testicle  Musculoskeletal:      Cervical back: Normal range of motion and neck supple  No rigidity or tenderness  Lymphadenopathy:      Cervical: No cervical adenopathy        Lower Body: No " right inguinal adenopathy  No left inguinal adenopathy  Neurological:      Mental Status: He is alert and oriented to person, place, and time  Psychiatric:         Mood and Affect: Mood normal          Behavior: Behavior normal          Thought Content: Thought content normal          Judgment: Judgment normal           Eliseo Diallo was seen today for physical exam     Diagnoses and all orders for this visit:    Annual physical exam  Comments:  Eliseo Diallo appears well  He is to continue a healthy, lower carb diet, and regular, low impact exercise  FBW ordered  Screening for diabetes mellitus  -     Comprehensive metabolic panel; Future    Screening for cardiovascular condition  -     Lipid Panel with Direct LDL reflex; Future    Other fatigue  -     CBC and differential; Future  -     TSH, 3rd generation with Free T4 reflex; Future    BMI 28 0-28 9,adult  Comments:  Diet and exercise as above  Scrotal cyst  Comments:  Likely a benign microlith - will though, get US as a precaution  Orders:  -     US scrotum and testicles;  Future          [address] Ethel Hunt, DO   [facility]

## 2023-05-11 ENCOUNTER — HOSPITAL ENCOUNTER (OUTPATIENT)
Dept: RADIOLOGY | Facility: MEDICAL CENTER | Age: 33
Discharge: HOME | End: 2023-05-11

## 2023-05-11 ENCOUNTER — OFFICE VISIT (OUTPATIENT)
Dept: URGENT CARE | Facility: MEDICAL CENTER | Age: 33
End: 2023-05-11

## 2023-05-11 VITALS
HEART RATE: 69 BPM | TEMPERATURE: 98.4 F | OXYGEN SATURATION: 98 % | WEIGHT: 197.4 LBS | RESPIRATION RATE: 18 BRPM | HEIGHT: 71 IN | SYSTOLIC BLOOD PRESSURE: 126 MMHG | BODY MASS INDEX: 27.64 KG/M2 | DIASTOLIC BLOOD PRESSURE: 77 MMHG

## 2023-05-11 DIAGNOSIS — A08.4 VIRAL GASTROENTERITIS: Primary | ICD-10-CM

## 2023-05-11 DIAGNOSIS — L72.9 SCROTAL CYST: ICD-10-CM

## 2023-05-11 RX ORDER — ONDANSETRON 8 MG/1
8 TABLET, ORALLY DISINTEGRATING ORAL EVERY 8 HOURS PRN
Qty: 6 TABLET | Refills: 0 | Status: SHIPPED | OUTPATIENT
Start: 2023-05-11 | End: 2023-05-15

## 2023-05-11 NOTE — PROGRESS NOTES
[facility]        NAME: Johnson Banda is a 33 y o  male  : 1990    MRN: [id number]  DATE: May 11, 2023  TIME: 7:23 PM    Assessment and Plan   Viral gastroenteritis [A08 4]  1  Viral gastroenteritis  ondansetron (ZOFRAN-ODT) 8 mg disintegrating tablet            Patient Instructions     1  Increase fluids  2  Take Zofran 8mg ODT every 8 hours as needed for nausea/vomiting  3  Watch for S&S of dehydration (dry/cracked lips, decreased urine output, listlessness)  4  Follow up with PCP in 3-5 days if symptoms persist       Chief Complaint     Chief Complaint   Patient presents with   • Fever     Pt reports nausea, vomiting, diarrhea and high fever x4 days  Patient was sent home from work today  Patient's at home covid test was negative  • Vomiting         History of Present Illness       Gavino Avelar is a 71-year-old male who presents with a 2-day history of nausea, vomiting and diarrhea  Patient reports his symptoms started with a low-grade fever followed by multiple episodes of diarrhea  He has had 3 episodes of vomiting over the last 12 hours  Patient denies any chills, body aches nasal symptoms or cough  He is at home COVID test was negative  Patient reports his son had a similar illness approximately 5 days prior  Review of Systems   Review of Systems   Constitutional: Positive for fatigue and fever  HENT: Negative  Respiratory: Negative  Gastrointestinal: Positive for diarrhea, nausea and vomiting           Current Medications       Current Outpatient Medications:   •  acetaminophen (TYLENOL) 500 mg tablet, Take 500 mg by mouth every 6 (six) hours as needed for mild pain, Disp: , Rfl:   •  DAILY MULTIPLE VITAMINS PO, Take by mouth daily, Disp: , Rfl:   •  ondansetron (ZOFRAN-ODT) 8 mg disintegrating tablet, Take 1 tablet (8 mg total) by mouth every 8 (eight) hours as needed for nausea or vomiting for up to 2 days, Disp: 6 tablet, Rfl: 0  •  celecoxib (CeleBREX) 200 mg capsule, Take "1 capsule (200 mg total) by mouth daily (Patient not taking: Reported on 1/16/2023), Disp: 90 capsule, Rfl: 1  •  hydrocortisone (ANUSOL-HC) 25 mg suppository, Insert 1 suppository (25 mg total) into the rectum 2 (two) times a day for 3 days (Patient not taking: Reported on 10/14/2022), Disp: 12 suppository, Rfl: 0  •  methocarbamol (Robaxin-750) 750 mg tablet, Take 1 tablet (750 mg total) by mouth 3 (three) times a day as needed for muscle spasms (Patient not taking: Reported on 8/29/2022), Disp: 30 tablet, Rfl: 0  •  oxyCODONE (ROXICODONE) 5 immediate release tablet, Take 1 tablet (5 mg total) by mouth every 4 (four) hours as needed for moderate pain for up to 15 doses Max Daily Amount: 30 mg (Patient not taking: Reported on 12/12/2022), Disp: 15 tablet, Rfl: 0    Current Allergies     Allergies as of 05/11/2023 - Reviewed 05/11/2023   Allergen Reaction Noted   • Ibuprofen Other (See Comments) 03/08/2021            The following portions of the patient's history were reviewed and updated as appropriate: allergies, current medications, past family history, past medical history, past social history, past surgical history and problem list      Past Medical History:   Diagnosis Date   • COVID 01/09/2022    Fever,fatigue   • GERD (gastroesophageal reflux disease)     rarely   • PONV (postoperative nausea and vomiting)     3/11/21 Pt states \"deals with slight nausea after anesthesia  \"       Past Surgical History:   Procedure Laterality Date   • ANKLE SURGERY Bilateral    • ARTHROSCOPY KNEE Right 3/1/2022    Procedure: RIGHT KNEE ARTHROSCOPY, ARTHROSCOPIC REMOVAL OF HARDWARE;  Surgeon: Ev Scanlon DO;  Location: [facility] MAIN OR;  Service: Orthopedics   • CARPAL TUNNEL RELEASE Right    • FL INJECTION RIGHT KNEE (ARTHROGRAM)  7/21/2021   • FL INJECTION RIGHT KNEE (ARTHROGRAM)  8/24/2022   • KNEE ARTHROSCOPY Right    • KNEE SURGERY Right    • NV ARTHROSCOPY KNEE W/MENISCUS RPR MEDIAL/LATERAL Right 3/16/2021    Procedure: " "ARTHROSCOPIC LATERAL MENISCUS REPAIR;  Surgeon: Lotus Phillips DO;  Location: [address] [place] [address] OR;  Service: Orthopedics   • ID ARTHRS KNE SURG W/MENISCECTOMY MED/LAT W/SHVG Right 10/25/2022    Procedure: Zelphia Sessions;  Surgeon: Lotus Phillips DO;  Location:  MAIN OR;  Service: Orthopedics   • ID FASCIOTOMY ILIOTIBIAL OPEN Right 10/25/2022    Procedure: RELEASE ILIOTIBIAL BAND;  Surgeon: Lotus Phillips DO;  Location: [address] [place] [address] OR;  Service: Orthopedics   • ID NEUROPLASTY &/TRANSPOS MEDIAN NRV CARPAL Everton Sinhaey Right 9/20/2018    Procedure: RELEASE CARPAL TUNNEL;  Surgeon: Zaida Aguillon MD;  Location:  MAIN OR;  Service: Orthopedics   • SYNOVECTOMY Right 9/20/2018    Procedure: TENOSYNOVECTOMY Flexor digitorum superficialis to index, long, ring and small finger; Flexor digitorum profundus to index, long, ring and small finger; Flexor pollicis longus;  Surgeon: Zaida Aguillon MD;  Location:  MAIN OR;  Service: Orthopedics   • TONSILLECTOMY AND ADENOIDECTOMY         Family History   Problem Relation Age of Onset   • Arthritis Mother    • No Known Problems Father          Medications have been verified  Objective   /77 (BP Location: Left arm)   Pulse 69   Temp 98 4 °F (36 9 °C) (Temporal)   Resp 18   Ht 5' 11\" (1 803 m)   Wt 89 5 kg (197 lb 6 4 oz)   SpO2 98%   BMI 27 53 kg/m²   No LMP for male patient  Physical Exam     Physical Exam  Constitutional:       General: He is not in acute distress  Appearance: Normal appearance  He is not ill-appearing  HENT:      Head: Normocephalic and atraumatic  Right Ear: Tympanic membrane and ear canal normal       Left Ear: Tympanic membrane and ear canal normal       Nose: Nose normal       Mouth/Throat:      Lips: Pink  Pharynx: Oropharynx is clear  Cardiovascular:      Rate and Rhythm: Normal rate and regular rhythm  Heart sounds: Normal heart sounds, S1 normal and S2 normal  No murmur heard    Pulmonary:      Effort: " Pulmonary effort is normal       Breath sounds: Normal breath sounds and air entry  Abdominal:      General: Abdomen is flat  Bowel sounds are increased  Palpations: Abdomen is soft  Tenderness: There is abdominal tenderness in the epigastric area  There is no guarding or rebound  Neurological:      Mental Status: He is alert

## 2023-05-11 NOTE — LETTER
May 11, 2023     Patient: Sarah Castillo   YOB: 1990   Date of Visit: 5/11/2023       To Whom It May Concern: It is my medical opinion that Sarah Castillo may return to work on 5/15/23  If you have any questions or concerns, please don't hesitate to call           Sincerely,        Yoandy Christian PA-C    CC: No Recipients

## 2023-05-11 NOTE — PATIENT INSTRUCTIONS
1  Increase fluids  2  Take Zofran 8mg ODT every 8 hours as needed for nausea/vomiting  3  Watch for S&S of dehydration (dry/cracked lips, decreased urine output, listlessness)  4   Follow up with PCP in 3-5 days if symptoms persist

## 2023-05-15 ENCOUNTER — TELEMEDICINE (OUTPATIENT)
Dept: FAMILY MEDICINE CLINIC | Facility: CLINIC | Age: 33
End: 2023-05-15

## 2023-05-15 ENCOUNTER — TELEPHONE (OUTPATIENT)
Dept: OTHER | Facility: OTHER | Age: 33
End: 2023-05-15

## 2023-05-15 VITALS — TEMPERATURE: 99.8 F

## 2023-05-15 DIAGNOSIS — A08.4 VIRAL GASTROENTERITIS: Primary | ICD-10-CM

## 2023-05-15 NOTE — TELEPHONE ENCOUNTER
Patient called in post telemedicine visit today to follow up on work excuse note  Please follow up with patient to confirm work note in My Chart

## 2023-05-15 NOTE — PROGRESS NOTES
Virtual Regular Visit    Verification of patient location:    Patient is located at Home in the following state in which I hold an active license [place]      Assessment/Plan:    Problem List Items Addressed This Visit    None  Visit Diagnoses     Viral gastroenteritis    -  Primary    Sx started 5 days ago and diagnosed with norovirus on Thursday  Several household members also diagnosed  TMAX 100 3  Tired appearing  Recc bland and BRAT diet  Lots of fluids and rest  Given that his symptoms improved then returned, recommend retesting for COVID with home test  Call precautions ( lethargy, rising fever curve, inability to maintain hydration, worsening SOB, etc)  Work excuse provided to allow 2 days off  F/U as needed                Reason for visit is   Chief Complaint   Patient presents with   • Nausea   • Vomiting     Patient is being seen for nausea, vomiting and diarrhea  • Diarrhea        Encounter provider Martha Gambino MD    Provider located at [place] [id number] [facility] [address]      Recent Visits  No visits were found meeting these conditions  Showing recent visits within past 7 days and meeting all other requirements  Today's Visits  Date Type Provider Dept   05/15/23 Telemedicine Martha Gambino MD [facility] today's visits and meeting all other requirements  Future Appointments  No visits were found meeting these conditions  Showing future appointments within next 150 days and meeting all other requirements       The patient was identified by name and date of birth  Angela Milian was informed that this is a telemedicine visit and that the visit is being conducted through the [employer]  He agrees to proceed     My office door was closed  No one else was in the room  He acknowledged consent and understanding of privacy and security of the video platform   The patient has agreed to participate and understands they can "discontinue the visit at any time  Patient is aware this is a billable service  Subjective  Cindy Whitmore is a 33 y o  male  Seen virtually with diarrhea, vomiting, and fatigue  Multiple sick contacts within the household  Seen at care now on Thursday and diagnosed with Norovirus  Prescribed Zofran  Able to keep things down  Symptoms improved on Sunday for 12 hours but returned last night  Able to keep light/bland foods down  Having 4-5 BMs a day  TMAX 100 3 at   Mildly SOB and chest tightness  COVID test was negative on Thursday  Requesting a work note         Past Medical History:   Diagnosis Date   • COVID 01/09/2022    Fever,fatigue   • GERD (gastroesophageal reflux disease)     rarely   • PONV (postoperative nausea and vomiting)     3/11/21 Pt states \"deals with slight nausea after anesthesia  \"       Past Surgical History:   Procedure Laterality Date   • ANKLE SURGERY Bilateral    • ARTHROSCOPY KNEE Right 3/1/2022    Procedure: RIGHT KNEE ARTHROSCOPY, ARTHROSCOPIC REMOVAL OF HARDWARE;  Surgeon: Valentino Frater, DO;  Location: [address] [place] [address];  Service: Orthopedics   • CARPAL TUNNEL RELEASE Right    • FL INJECTION RIGHT KNEE (ARTHROGRAM)  7/21/2021   • FL INJECTION RIGHT KNEE (ARTHROGRAM)  8/24/2022   • KNEE ARTHROSCOPY Right    • KNEE SURGERY Right    • AK ARTHROSCOPY KNEE W/MENISCUS RPR MEDIAL/LATERAL Right 3/16/2021    Procedure: ARTHROSCOPIC LATERAL MENISCUS REPAIR;  Surgeon: Valentino Frater, DO;  Location: [address] [place] [address];  Service: Orthopedics   • [address] W/MENISCECTOMY MED/LAT W/SHVG Right 10/25/2022    Procedure: MENISCECTOMY LATERAL;  Surgeon: Valentino Frater, DO;  Location: [address] [place] [address] OR;  Service: Orthopedics   • AK FASCIOTOMY ILIOTIBIAL OPEN Right 10/25/2022    Procedure: RELEASE ILIOTIBIAL BAND;  Surgeon: Valentino Frater, DO;  Location: [address] [place] [address] OR;  Service: Orthopedics   • AK NEUROPLASTY &/TRANSPOS MEDIAN NRV CARPAL Lesta Bills Right 9/20/2018    Procedure: RELEASE CARPAL TUNNEL;  Surgeon: Warren mSart " Davie Johnson MD;  Location:  MAIN OR;  Service: Orthopedics   • SYNOVECTOMY Right 9/20/2018    Procedure: TENOSYNOVECTOMY Flexor digitorum superficialis to index, long, ring and small finger; Flexor digitorum profundus to index, long, ring and small finger; Flexor pollicis longus;  Surgeon: Yasmine Vasquez MD;  Location:  MAIN OR;  Service: Orthopedics   • TONSILLECTOMY AND ADENOIDECTOMY         Current Outpatient Medications   Medication Sig Dispense Refill   • acetaminophen (TYLENOL) 500 mg tablet Take 500 mg by mouth every 6 (six) hours as needed for mild pain     • DAILY MULTIPLE VITAMINS PO Take by mouth daily     • ondansetron (ZOFRAN-ODT) 8 mg disintegrating tablet Take 1 tablet (8 mg total) by mouth every 8 (eight) hours as needed for nausea or vomiting for up to 2 days 6 tablet 0   • celecoxib (CeleBREX) 200 mg capsule Take 1 capsule (200 mg total) by mouth daily (Patient not taking: Reported on 1/16/2023) 90 capsule 1   • hydrocortisone (ANUSOL-HC) 25 mg suppository Insert 1 suppository (25 mg total) into the rectum 2 (two) times a day for 3 days (Patient not taking: Reported on 10/14/2022) 12 suppository 0   • oxyCODONE (ROXICODONE) 5 immediate release tablet Take 1 tablet (5 mg total) by mouth every 4 (four) hours as needed for moderate pain for up to 15 doses Max Daily Amount: 30 mg (Patient not taking: Reported on 12/12/2022) 15 tablet 0     No current facility-administered medications for this visit  Allergies   Allergen Reactions   • Ibuprofen Other (See Comments)     Mouth sores         Review of Systems   Constitutional: Positive for appetite change, chills and fatigue  Negative for fever  Respiratory: Positive for chest tightness and shortness of breath  Negative for cough, wheezing and stridor  Gastrointestinal: Positive for diarrhea, nausea and vomiting  Skin: Negative          Video Exam    Vitals:    05/15/23 1140   Temp: 99 8 °F (37 7 °C)   TempSrc: Oral       Physical Exam  Vitals reviewed  Constitutional:       General: He is not in acute distress  Appearance: He is ill-appearing  He is not toxic-appearing or diaphoretic  Comments: Tired appearing   HENT:      Head: Normocephalic and atraumatic  Eyes:      Extraocular Movements: Extraocular movements intact  Pulmonary:      Effort: Pulmonary effort is normal  No respiratory distress  Skin:     Coloration: Skin is not pale     Psychiatric:         Mood and Affect: Mood normal          Behavior: Behavior normal           Visit Time  Total Visit Duration: 8

## 2023-07-14 ENCOUNTER — OFFICE VISIT (OUTPATIENT)
Dept: FAMILY MEDICINE CLINIC | Facility: CLINIC | Age: 33
End: 2023-07-14
Payer: COMMERCIAL

## 2023-07-14 VITALS
WEIGHT: 197 LBS | SYSTOLIC BLOOD PRESSURE: 118 MMHG | TEMPERATURE: 97.1 F | HEART RATE: 82 BPM | DIASTOLIC BLOOD PRESSURE: 76 MMHG | OXYGEN SATURATION: 99 % | RESPIRATION RATE: 16 BRPM | BODY MASS INDEX: 27.48 KG/M2

## 2023-07-14 DIAGNOSIS — K64.8 INTERNAL HEMORRHOIDS: ICD-10-CM

## 2023-07-14 DIAGNOSIS — K52.9 GASTROENTERITIS: Primary | ICD-10-CM

## 2023-07-14 DIAGNOSIS — K62.5 BRBPR (BRIGHT RED BLOOD PER RECTUM): ICD-10-CM

## 2023-07-14 PROCEDURE — 99214 OFFICE O/P EST MOD 30 MIN: CPT | Performed by: FAMILY MEDICINE

## 2023-07-14 RX ORDER — DICYCLOMINE HYDROCHLORIDE 10 MG/1
10 CAPSULE ORAL
Qty: 21 CAPSULE | Refills: 0 | Status: SHIPPED | OUTPATIENT
Start: 2023-07-14

## 2023-07-14 NOTE — PROGRESS NOTES
Name: Daniel Robin      : 1990      MRN: [id number]  Encounter Provider: Bee Lawson MD  Encounter Date: 2023   Encounter department: [address] [place] [address]  today with rectal bleeding and abdominal pain. He has similar symptoms in May where he had been recently exposed to norovirus. Patient reports diarrhea today with abdominal pain over the past 3 days. Patient is afebrile and in no acute distress. No hemorrhoids or fissures appreciated on exam.  Patient likely has internal hemorrhoids and will refer to colorectal for further evaluation and possible colonoscopy. I have also provided patient with Bentyl 10 mg 3 times daily for symptom relief. Recommend brat diet to reduce diarrhea followed by fiber and fluids. F/u with me as needed    1. Gastroenteritis  -     dicyclomine (BENTYL) 10 mg capsule; Take 1 capsule (10 mg total) by mouth 3 (three) times a day before meals    2. BRBPR (bright red blood per rectum)  -     Ambulatory Referral to Colorectal Surgery; Future    3. Internal hemorrhoids  -     Ambulatory Referral to Colorectal Surgery; Future           Subjective      Presentes with melena and abdominal pain. Symptoms started 3 days ago. He has had bleeding in the past when wiping. He was seen by me and determined to have internal hemorrhoids. He reports lower abdominal pain with 1 episode of diarrhea this morning. No recent travels, constipation, sick contacts, palpitation, lightheadedness, nausea, vomiting, or fevers. Patient has never had a colonoscopy nor seen GI    Review of Systems   Constitutional: Negative for fatigue and fever. Respiratory: Negative for shortness of breath. Cardiovascular: Negative for palpitations. Gastrointestinal: Positive for abdominal pain, anal bleeding and diarrhea. Negative for constipation. Skin: Negative for color change. Neurological: Negative for light-headedness.        Current Outpatient Medications on File Prior to Visit   Medication Sig   • DAILY MULTIPLE VITAMINS PO Take by mouth daily   • acetaminophen (TYLENOL) 500 mg tablet Take 500 mg by mouth every 6 (six) hours as needed for mild pain (Patient not taking: Reported on 7/14/2023)   • celecoxib (CeleBREX) 200 mg capsule Take 1 capsule (200 mg total) by mouth daily (Patient not taking: Reported on 1/16/2023)   • hydrocortisone (ANUSOL-HC) 25 mg suppository Insert 1 suppository (25 mg total) into the rectum 2 (two) times a day for 3 days (Patient not taking: Reported on 10/14/2022)   • ondansetron (ZOFRAN-ODT) 8 mg disintegrating tablet Take 1 tablet (8 mg total) by mouth every 8 (eight) hours as needed for nausea or vomiting for up to 2 days   • [DISCONTINUED] oxyCODONE (ROXICODONE) 5 immediate release tablet Take 1 tablet (5 mg total) by mouth every 4 (four) hours as needed for moderate pain for up to 15 doses Max Daily Amount: 30 mg (Patient not taking: Reported on 12/12/2022)       Objective     /76   Pulse 82   Temp (!) 97.1 °F (36.2 °C)   Resp 16   Wt 89.4 kg (197 lb)   SpO2 99%   BMI 27.48 kg/m²     Physical Exam  Vitals reviewed. Constitutional:       General: He is not in acute distress. Appearance: Normal appearance. He is not ill-appearing or toxic-appearing. HENT:      Head: Normocephalic and atraumatic. Abdominal:      General: Abdomen is flat. Bowel sounds are increased. There is no distension. Palpations: Abdomen is soft. There is no mass. Tenderness: There is abdominal tenderness in the right lower quadrant, periumbilical area, suprapubic area and left lower quadrant. There is no guarding or rebound. Hernia: No hernia is present. Genitourinary:     Penis: Normal.    Skin:     Coloration: Skin is not pale. Neurological:      Mental Status: He is alert. Psychiatric:         Mood and Affect: Mood normal.         Behavior: Behavior normal.         Thought Content:  Thought content normal. Mesfin Laughlin MD

## 2023-09-15 NOTE — PROGRESS NOTES
Colon and Rectal Surgery   Clotilde Paez 33 y.o. male MRN: [id number]   Encounter: [id number]  09/20/23   10:53 AM        ASSESSMENT:    Yanni Abraham is a 28yo male, stable health, prior orthopedic surgeries listed. Rectal bleeding on/off, today's exam with Normal anorectal exam, normal anoscopy without any hemorrhoidal engorgement or other stigma of bleeding. Discussed rule out proximal lesion or colitis,colonoscopy,discussed in a face-to-face, personal, informed consent process, the benefits, alternatives, risks including not limited to bleeding, missed lesion, perforation requiring emergent surgery discussed/understood. PLAN:  High fiber diet/increased hydration, 20-30grams fiber per day, increased fruits/vegetables/psyllium(metamucil or konsyl 1 tbsp 1-2x/day)  Colonoscopy scheduled  CC:  Dr. Cat Lynn    HPI  Clotilde Paez is a 33 y.o. male referred for evaluation today by Dr. Talisha Alberto for rectal bleeding.      Denies family hx colon polyps/cancer, denies IBD hx.    Historical Information   Past Medical History:   Diagnosis Date   • COVID 01/09/2022    Fever,fatigue   • GERD (gastroesophageal reflux disease)     rarely   • PONV (postoperative nausea and vomiting)     3/11/21 Pt states "deals with slight nausea after anesthesia."     Past Surgical History:   Procedure Laterality Date   • ANKLE SURGERY Bilateral    • ARTHROSCOPY KNEE Right 3/1/2022    Procedure: RIGHT KNEE ARTHROSCOPY, ARTHROSCOPIC REMOVAL OF HARDWARE;  Surgeon: Charline Abernathy DO;  Location: [address] [place] [address] MAIN OR;  Service: Orthopedics   • CARPAL TUNNEL RELEASE Right    • FL INJECTION RIGHT KNEE (ARTHROGRAM)  7/21/2021   • FL INJECTION RIGHT KNEE (ARTHROGRAM)  8/24/2022   • KNEE ARTHROSCOPY Right    • KNEE SURGERY Right    • MT ARTHROSCOPY KNEE W/MENISCUS RPR MEDIAL/LATERAL Right 3/16/2021    Procedure: ARTHROSCOPIC LATERAL MENISCUS REPAIR;  Surgeon: Charline Abernathy DO;  Location:  MAIN OR;  Service: Orthopedics   • MT ARTHRS KNE SURG W/MENISCECTOMY MED/LAT W/SHVG Right 10/25/2022    Procedure: MENISCECTOMY LATERAL;  Surgeon: Louisa Pérez DO;  Location:  MAIN OR;  Service: Orthopedics   • OH FASCIOTOMY ILIOTIBIAL OPEN Right 10/25/2022    Procedure: RELEASE ILIOTIBIAL BAND;  Surgeon: Louisa Pérez DO;  Location:  MAIN OR;  Service: Orthopedics   • [place] NEUROPLASTY &/TRANSPOS MEDIAN NRV CARPAL Charlies Riedel Right 9/20/2018    Procedure: RELEASE CARPAL TUNNEL;  Surgeon: Vaishnavi Hyman MD;  Location:  MAIN OR;  Service: Orthopedics   • SYNOVECTOMY Right 9/20/2018    Procedure: TENOSYNOVECTOMY Flexor digitorum superficialis to index, long, ring and small finger; Flexor digitorum profundus to index, long, ring and small finger; Flexor pollicis longus;  Surgeon: Vaishnavi Hyman MD;  Location:  MAIN OR;  Service: Orthopedics   • TONSILLECTOMY AND ADENOIDECTOMY         Meds/Allergies       Current Outpatient Medications:   •  acetaminophen (TYLENOL) 500 mg tablet, Take 500 mg by mouth every 6 (six) hours as needed for mild pain (Patient not taking: Reported on 7/14/2023), Disp: , Rfl:   •  celecoxib (CeleBREX) 200 mg capsule, Take 1 capsule (200 mg total) by mouth daily (Patient not taking: Reported on 1/16/2023), Disp: 90 capsule, Rfl: 1  •  DAILY MULTIPLE VITAMINS PO, Take by mouth daily, Disp: , Rfl:   •  dicyclomine (BENTYL) 10 mg capsule, Take 1 capsule (10 mg total) by mouth 3 (three) times a day before meals (Patient not taking: Reported on 9/19/2023), Disp: 21 capsule, Rfl: 0  •  hydrocortisone (ANUSOL-HC) 25 mg suppository, Insert 1 suppository (25 mg total) into the rectum 2 (two) times a day for 3 days (Patient not taking: Reported on 10/14/2022), Disp: 12 suppository, Rfl: 0  •  ondansetron (ZOFRAN-ODT) 8 mg disintegrating tablet, Take 1 tablet (8 mg total) by mouth every 8 (eight) hours as needed for nausea or vomiting for up to 2 days, Disp: 6 tablet, Rfl: 0      Allergies   Allergen Reactions   • Ibuprofen Other (See Comments)     Mouth sores Social History   Social History     Substance and Sexual Activity   Alcohol Use Yes   • Alcohol/week: 3.0 standard drinks of alcohol   • Types: 3 Cans of beer per week    Comment: social     Social History     Substance and Sexual Activity   Drug Use No    Comment: denies     Social History     Tobacco Use   Smoking Status Never   • Passive exposure: Never   Smokeless Tobacco Never   Tobacco Comments    Denies         Family History:   Family History   Problem Relation Age of Onset   • Arthritis Mother    • No Known Problems Father        Review of Systems   Constitutional: Negative. HENT: Negative. Eyes: Negative. Respiratory: Negative. Cardiovascular: Negative. Gastrointestinal: Positive for anal bleeding. Endocrine: Negative. Genitourinary: Negative. Musculoskeletal: Negative. Skin: Negative. Allergic/Immunologic: Negative. Neurological: Negative. Hematological: Negative. Psychiatric/Behavioral: Negative. Objective   Current Vitals:   Vitals:    09/19/23 1550   Weight: 86.2 kg (190 lb)   Height: 5' 11" (1.803 m)     Physical Exam:  General:no distress  Eyes:perrla/eomi  Neck:supple  Pulm:no increased work of breathing, clear bilateral  CV:sinus  Abdomen:soft,nontender  Rectal:normal perianal skin/sphincter tone, no masses palpated  Extremities:no edema    Anoscopy    Date/Time: 9/19/2023 3:55 PM    Performed by: Santiago Hawkins MD  Authorized by: Santiago Hawkins MD    Verbal consent obtained?: Yes    Consent given by:  Patient  Patient identity confirmed:  Verbally with patient  Indications: rectal bleeding    Scope type: Anoscope   Normal anorectal mucosa, normal hemorrhoidal columns without stigma of recent bleeding.

## 2023-09-19 ENCOUNTER — TELEPHONE (OUTPATIENT)
Age: 33
End: 2023-09-19

## 2023-09-19 ENCOUNTER — OFFICE VISIT (OUTPATIENT)
Age: 33
End: 2023-09-19
Payer: COMMERCIAL

## 2023-09-19 VITALS — WEIGHT: 190 LBS | BODY MASS INDEX: 26.6 KG/M2 | HEIGHT: 71 IN

## 2023-09-19 DIAGNOSIS — K62.5 BRBPR (BRIGHT RED BLOOD PER RECTUM): Primary | ICD-10-CM

## 2023-09-19 DIAGNOSIS — K64.8 INTERNAL HEMORRHOIDS: ICD-10-CM

## 2023-09-19 DIAGNOSIS — K62.5 RECTAL BLEEDING: Primary | ICD-10-CM

## 2023-09-19 PROCEDURE — 99243 OFF/OP CNSLTJ NEW/EST LOW 30: CPT | Performed by: COLON & RECTAL SURGERY

## 2023-09-19 PROCEDURE — 46600 DIAGNOSTIC ANOSCOPY SPX: CPT | Performed by: COLON & RECTAL SURGERY

## 2023-09-19 NOTE — LETTER
Valeriy Larson  [address] [facility] [address]        FLEXIBLE COLONOSCOPY INSTRUCTIONS  PLEASE NOTE. .. AS OF JUNE 1, 2014, OUR OFFICE REQUIRES 72 HOURS NOTICE OF CANCELLATION/RESCHEDULE OF A PROCEDURE TO AVOID INCURRING A MISSED APPOINTMENT FEE. Your Colonoscopy Procedure has been scheduled at:  [address]. [facility] [place] [phone]    The Date of your Procedure is: November 1, 2023     Dr. Jamin Porras  will be performing the procedure. Report to the [facility] 45 minutes prior to the procedure. The total time at the facility will be approximately 1 1/2 hours. Please bring to your procedure at [facility]:   1. Insurance Cards and referrals if required by [employer] company   2. Valid Photo ID   3. Power of  Form if required    Use the bowel preparation as directed. Check with your family doctor if you are taking a blood thinner (Coumadin, Plavix, Xarelto, Pradaxa, Gingko biloba, Ginseng, Feverfew, Jesus Manuel's Wort). We suggest stopping these for 3 days. Special instructions may be needed if you are taking aspirin or any aspirin-containing medication. Check with your family physician. If you are on DIABETIC MEDICATION (tablets or insulin) your doctor may make changes in your preparation. Take all medications usual unless otherwise instructed. Feel free to call the physician's office to answer any questions or address any concerns you have regarding your procedure or preparation. A nurse will be happy to assist you. Because anesthesia is given to you during the procedure, the center requires that you be discharged under supervision of an adult to take you home after the procedure is performed. They will also need to sign as a witness on your discharge instructions. Public Transportation such as VAST, BUS, TAXI is Not Acceptable.     It is important you notify our office of any insurance changes prior to your procedure and, if necessary, supply us with referrals from your primary care physician. COLONOSCOPY PREPARATION INSTRUCTIONS    Purchase (prescription not required):  · 238 gram bottle of Miralax® (Glycolax®)  · 4 Dulcolax® (Bisacodyl) Laxative Tablets  · 64 oz. bottle of Gatorade® or your preference of a non-carbonated clear liquid - NOT RED OR PURPLE     One Day Prior to Colonoscopy Procedure  · Nothing to eat the day before your procedure, only clear liquids. · It is important that you drink plenty of clear liquids throughout the day to prevent dehydration. Clear Liquids include:  o Water/Iced Tea/Lemonade/Gatorade®/Black Coffee or tea (no milk or creamers  o Soft drinks: orange, ginger ale, cola, Pepsi®, Sprite®, 7Up®  o Mejia-Aid® (lemonade or orange flavors only)  o Strained fruit juices without pulp such as apple, white grape, white cranberry  o Jell-O®, lemon, lime or orange (no fruit or toppings)  o Popsicles, Oj Ice (No Ice Cream, sherbets, or fruit bars)  o Chicken or beef bouillon/broth  DO NOT EAT OR DRINK ANYTHING RED OR PURPLE  DO NOT DRINK ANY ALCOHOLIC BEVERAGES  DIABETIC PATIENTS: Consult your physician    At 4:00 pm, take (2) Dulcolax® (Bisacodyl) Laxative Tablets. Swallow the tablets whole with an 8 oz. glass of water. At 8:00 pm, take the additional (2) Dulcolax® (Bisacodyl) Laxative Tablets with 8 oz. of water. The package may direct you not to exceed (2) tablets at any time but for the purpose of this examination, you should take (4) total.    Mix the 238 gm of Miralax® in 64 oz. of Gatorade® and shake the solution until the Miralax® is dissolved. You will drink half (32 oz) of this solution this evening, beginning between 4 and 6 o'clock. Drink 8 oz glassfuls at your own pace. It may take several hours to drink the solution. Remember to stay close to toilet facilities.     DAY OF COLONOSCOPY PROCEDURE    Five (5) hours before your procedure, drink the other half (32 oz) of the Miralax®/Gatorade® mixture within a two (2) hour period. This may require you to get up very early if you are scheduled for an early procedure. NOTHING IS TO BE TAKEN BY MOUTH 3 HOURS PRIOR TO PROCEDURE. If you use an inhaler, please bring it with you to your procedure.

## 2023-09-19 NOTE — TELEPHONE ENCOUNTER
Patient's spouse called in to check on response to patient's My Chart message sent 9/12/23 post eye exam and concern for headaches and follow up. Please refer to message and follow up with patient.

## 2023-09-19 NOTE — TELEPHONE ENCOUNTER
DE OV 9/19 rectal bleeding, no prior fc, BMI 26     Scheduled DE 11/1 BEC   Handed OW to patient
Home

## 2023-09-20 NOTE — PATIENT INSTRUCTIONS
ASSESSMENT:    Johanny Rachel is a 28yo male, stable health, prior orthopedic surgeries listed. Rectal bleeding on/off, today's exam with Normal anorectal exam, normal anoscopy without any hemorrhoidal engorgement or other stigma of bleeding. Discussed rule out proximal lesion or colitis,colonoscopy,discussed in a face-to-face, personal, informed consent process, the benefits, alternatives, risks including not limited to bleeding, missed lesion, perforation requiring emergent surgery discussed/understood.     PLAN:  High fiber diet/increased hydration, 20-30grams fiber per day, increased fruits/vegetables/psyllium(metamucil or konsyl 1 tbsp 1-2x/day)  Colonoscopy scheduled  CC:  Dr. Shelli Denver

## 2023-10-19 ENCOUNTER — OFFICE VISIT (OUTPATIENT)
Dept: URGENT CARE | Facility: MEDICAL CENTER | Age: 33
End: 2023-10-19
Payer: COMMERCIAL

## 2023-10-19 VITALS
TEMPERATURE: 98 F | RESPIRATION RATE: 20 BRPM | HEART RATE: 80 BPM | DIASTOLIC BLOOD PRESSURE: 80 MMHG | WEIGHT: 190 LBS | BODY MASS INDEX: 26.6 KG/M2 | SYSTOLIC BLOOD PRESSURE: 132 MMHG | HEIGHT: 71 IN | OXYGEN SATURATION: 98 %

## 2023-10-19 DIAGNOSIS — Z20.822 ENCOUNTER FOR LABORATORY TESTING FOR COVID-19 VIRUS: ICD-10-CM

## 2023-10-19 DIAGNOSIS — J01.00 ACUTE MAXILLARY SINUSITIS, RECURRENCE NOT SPECIFIED: Primary | ICD-10-CM

## 2023-10-19 DIAGNOSIS — J02.9 ACUTE PHARYNGITIS, UNSPECIFIED ETIOLOGY: ICD-10-CM

## 2023-10-19 LAB
S PYO AG THROAT QL: NEGATIVE
SARS-COV-2 AG UPPER RESP QL IA: NEGATIVE
VALID CONTROL: NORMAL

## 2023-10-19 PROCEDURE — 87811 SARS-COV-2 COVID19 W/OPTIC: CPT | Performed by: PHYSICIAN ASSISTANT

## 2023-10-19 PROCEDURE — 99213 OFFICE O/P EST LOW 20 MIN: CPT | Performed by: PHYSICIAN ASSISTANT

## 2023-10-19 PROCEDURE — 87880 STREP A ASSAY W/OPTIC: CPT | Performed by: PHYSICIAN ASSISTANT

## 2023-10-19 PROCEDURE — 87070 CULTURE OTHR SPECIMN AEROBIC: CPT | Performed by: PHYSICIAN ASSISTANT

## 2023-10-19 RX ORDER — FLUTICASONE PROPIONATE 50 MCG
1 SPRAY, SUSPENSION (ML) NASAL DAILY
Qty: 9.9 ML | Refills: 0 | Status: SHIPPED | OUTPATIENT
Start: 2023-10-19

## 2023-10-19 RX ORDER — BENZONATATE 100 MG/1
100 CAPSULE ORAL 3 TIMES DAILY PRN
Qty: 20 CAPSULE | Refills: 0 | Status: SHIPPED | OUTPATIENT
Start: 2023-10-19

## 2023-10-19 RX ORDER — AMOXICILLIN 500 MG/1
500 CAPSULE ORAL EVERY 8 HOURS SCHEDULED
Qty: 30 CAPSULE | Refills: 0 | Status: SHIPPED | OUTPATIENT
Start: 2023-10-19 | End: 2023-10-29

## 2023-10-19 NOTE — PROGRESS NOTES
[facility]        NAME: Allen Tan is a 29 y.o. male  : 1990    MRN: [id number]  DATE: 2023  TIME: 4:46 PM    /80   Pulse 80   Temp 98 °F (36.7 °C) (Temporal)   Resp 20   Ht 5' 11" (1.803 m)   Wt 86.2 kg (190 lb)   SpO2 98%   BMI 26.50 kg/m²     Assessment and Plan   Acute maxillary sinusitis, recurrence not specified [J01.00]  1. Acute maxillary sinusitis, recurrence not specified  amoxicillin (AMOXIL) 500 mg capsule    fluticasone (FLONASE) 50 mcg/act nasal spray    benzonatate (TESSALON PERLES) 100 mg capsule    POCT rapid strepA    Poct Covid 19 Rapid Antigen Test      2. Acute pharyngitis, unspecified etiology  amoxicillin (AMOXIL) 500 mg capsule    fluticasone (FLONASE) 50 mcg/act nasal spray    benzonatate (TESSALON PERLES) 100 mg capsule    POCT rapid strepA    Poct Covid 19 Rapid Antigen Test    Throat culture      3. Encounter for laboratory testing for COVID-19 virus              Patient Instructions       Follow up with PCP in 3-5 days. Proceed to  ER if symptoms worsen. Chief Complaint     Chief Complaint   Patient presents with    Headache     Headache, fatigue, chills, diarrhea, body aches started yesterday     Sore Throat     Sore throat started two days ago         History of Present Illness       Pt with sore throat fever frontal headache minor cough body aches for several days      Headache  Sore Throat   Associated symptoms include congestion, coughing and headaches. Review of Systems   Review of Systems   Constitutional: Negative. HENT:  Positive for congestion, sinus pressure, sinus pain and sore throat. Eyes: Negative. Respiratory:  Positive for cough. Cardiovascular: Negative. Gastrointestinal: Negative. Endocrine: Negative. Genitourinary: Negative. Musculoskeletal: Negative. Skin: Negative. Allergic/Immunologic: Negative. Neurological:  Positive for headaches. Hematological: Negative. Psychiatric/Behavioral: Negative. All other systems reviewed and are negative.         Current Medications       Current Outpatient Medications:     amoxicillin (AMOXIL) 500 mg capsule, Take 1 capsule (500 mg total) by mouth every 8 (eight) hours for 10 days, Disp: 30 capsule, Rfl: 0    benzonatate (TESSALON PERLES) 100 mg capsule, Take 1 capsule (100 mg total) by mouth 3 (three) times a day as needed for cough, Disp: 20 capsule, Rfl: 0    fluticasone (FLONASE) 50 mcg/act nasal spray, 1 spray into each nostril daily, Disp: 9.9 mL, Rfl: 0    acetaminophen (TYLENOL) 500 mg tablet, Take 500 mg by mouth every 6 (six) hours as needed for mild pain (Patient not taking: Reported on 7/14/2023), Disp: , Rfl:     celecoxib (CeleBREX) 200 mg capsule, Take 1 capsule (200 mg total) by mouth daily (Patient not taking: Reported on 1/16/2023), Disp: 90 capsule, Rfl: 1    DAILY MULTIPLE VITAMINS PO, Take by mouth daily, Disp: , Rfl:     dicyclomine (BENTYL) 10 mg capsule, Take 1 capsule (10 mg total) by mouth 3 (three) times a day before meals (Patient not taking: Reported on 9/19/2023), Disp: 21 capsule, Rfl: 0    hydrocortisone (ANUSOL-HC) 25 mg suppository, Insert 1 suppository (25 mg total) into the rectum 2 (two) times a day for 3 days (Patient not taking: Reported on 10/14/2022), Disp: 12 suppository, Rfl: 0    ondansetron (ZOFRAN-ODT) 8 mg disintegrating tablet, Take 1 tablet (8 mg total) by mouth every 8 (eight) hours as needed for nausea or vomiting for up to 2 days, Disp: 6 tablet, Rfl: 0    Current Allergies     Allergies as of 10/19/2023 - Reviewed 10/19/2023   Allergen Reaction Noted    Ibuprofen Other (See Comments) 03/08/2021            The following portions of the patient's history were reviewed and updated as appropriate: allergies, current medications, past family history, past medical history, past social history, past surgical history and problem list.     Past Medical History:   Diagnosis Date    COVID 01/09/2022    Fever,fatigue    GERD (gastroesophageal reflux disease)     rarely    PONV (postoperative nausea and vomiting)     3/11/21 Pt states "deals with slight nausea after anesthesia."       Past Surgical History:   Procedure Laterality Date    ANKLE SURGERY Bilateral     ARTHROSCOPY KNEE Right 3/1/2022    Procedure: RIGHT KNEE ARTHROSCOPY, ARTHROSCOPIC REMOVAL OF HARDWARE;  Surgeon: Blayne Patel DO;  Location: [address] [place] [address] OR;  Service: Orthopedics    CARPAL TUNNEL RELEASE Right     FL INJECTION RIGHT KNEE (ARTHROGRAM)  7/21/2021    FL INJECTION RIGHT KNEE (ARTHROGRAM)  8/24/2022    KNEE ARTHROSCOPY Right     KNEE SURGERY Right     RI ARTHROSCOPY KNEE W/MENISCUS RPR MEDIAL/LATERAL Right 3/16/2021    Procedure: ARTHROSCOPIC LATERAL MENISCUS REPAIR;  Surgeon: Blayne Patel DO;  Location: [address] [place] [address] OR;  Service: Orthopedics    RI ARTHRS KNE SURG W/MENISCECTOMY MED/LAT W/SHVG Right 10/25/2022    Procedure: Brian Sheldon;  Surgeon: Blayne Patel DO;  Location: [address] [place] [address] OR;  Service: Orthopedics    RI FASCIOTOMY ILIOTIBIAL OPEN Right 10/25/2022    Procedure: RELEASE ILIOTIBIAL BAND;  Surgeon: Blayne Patel DO;  Location: [address] [place] [address] OR;  Service: Orthopedics    RI NEUROPLASTY &/TRANSPOS MEDIAN NRV CARPAL Cindy Cramp Right 9/20/2018    Procedure: RELEASE CARPAL TUNNEL;  Surgeon: Jessi Torres MD;  Location:  MAIN OR;  Service: Orthopedics    SYNOVECTOMY Right 9/20/2018    Procedure: TENOSYNOVECTOMY Flexor digitorum superficialis to index, long, ring and small finger; Flexor digitorum profundus to index, long, ring and small finger; Flexor pollicis longus;  Surgeon: Jessi Torres MD;  Location:  MAIN OR;  Service: Orthopedics    TONSILLECTOMY AND ADENOIDECTOMY         Family History   Problem Relation Age of Onset    Arthritis Mother     No Known Problems Father          Medications have been verified.         Objective   /80   Pulse 80   Temp 98 °F (36.7 °C) (Temporal)   Resp 20   Ht 5' 11" (1.803 m)   Wt 86.2 kg (190 lb)   SpO2 98%   BMI 26.50 kg/m²        Physical Exam     Physical Exam  Vitals and nursing note reviewed. Constitutional:       Appearance: He is well-developed and normal weight. HENT:      Head: Normocephalic and atraumatic. Right Ear: Tympanic membrane and ear canal normal.      Left Ear: Tympanic membrane and ear canal normal.      Nose: Congestion and rhinorrhea present. Comments: Boggy mucosa max and frontal sinus tender to palp      Mouth/Throat:      Pharynx: Posterior oropharyngeal erythema present. Eyes:      Conjunctiva/sclera: Conjunctivae normal.   Cardiovascular:      Rate and Rhythm: Normal rate. Heart sounds: Normal heart sounds. Pulmonary:      Effort: Pulmonary effort is normal.      Breath sounds: Normal breath sounds. Abdominal:      General: Bowel sounds are normal.      Palpations: Abdomen is soft. Musculoskeletal:      Cervical back: Normal range of motion and neck supple. Skin:     General: Skin is warm. Capillary Refill: Capillary refill takes less than 2 seconds. Neurological:      Mental Status: He is alert and oriented to person, place, and time.

## 2023-10-21 LAB — BACTERIA THROAT CULT: NORMAL

## 2023-10-31 ENCOUNTER — TELEPHONE (OUTPATIENT)
Age: 33
End: 2023-10-31

## 2023-10-31 NOTE — TELEPHONE ENCOUNTER
Pt's wife calling asking when will they be calling pt w/ arrival time for his procedure tomorrow. Also what is the address of the location for his procedure. I gave pt the address on his scheduled procedure tomorrow w/ Dr. Fredrick Bosch in [place]. I also informed her that no one has called pt w/ arrival time and it will most likely be around 3 pm to 6 pm. Per Mandy wray.

## 2023-11-09 ENCOUNTER — NURSE TRIAGE (OUTPATIENT)
Age: 33
End: 2023-11-09

## 2023-11-09 NOTE — TELEPHONE ENCOUNTER
the patient complains of rectal bleeding with bowel movement for the past 2 days. He states that he had bright red blood dripping into the bowl. Patient advised to maintain a high fiber diet , hydration and wash with warm water after bowel movements. Patient was offered an OV but declined at this time he states that will call back later.

## 2023-11-22 ENCOUNTER — AMB VIDEO VISIT (OUTPATIENT)
Dept: OTHER | Facility: HOSPITAL | Age: 33
End: 2023-11-22
Payer: COMMERCIAL

## 2023-11-22 DIAGNOSIS — U07.1 COVID-19: Primary | ICD-10-CM

## 2023-11-22 PROCEDURE — 99212 OFFICE O/P EST SF 10 MIN: CPT | Performed by: PHYSICIAN ASSISTANT

## 2023-11-22 RX ORDER — ALBUTEROL SULFATE 90 UG/1
2 AEROSOL, METERED RESPIRATORY (INHALATION) EVERY 6 HOURS PRN
Qty: 6.7 G | Refills: 0 | Status: SHIPPED | OUTPATIENT
Start: 2023-11-22

## 2023-11-22 NOTE — PATIENT INSTRUCTIONS
Your COVID test is positive. Please stay quarantined/isolation in your home. Do not interact with your family or other people in the community. You must stay isolation/quarantine for a minimum of 5 days after symptoms have begun and be symptom-free (without fever) for 24 hours before you go out of quarantine/isolation. Then follow strict mask wearing for an additional 5 days. The people you live with or people should follow CDC recommendations regarding protocols. [email or web address]    Take Vitamin D3 2000 units daily. 18yrs and older only    Please avoid in-store purchasing of supplies and medications    Please self-prone (sleep on your stomach if possible).     Please call this department with any questions or concerns    Call your family doctor when you receive these results    Return to the ED with any worsening symptoms

## 2023-11-22 NOTE — PROGRESS NOTES
Video Visit - Jayden Larson 29 y.o. male MRN: [id number]    REQUIRED DOCUMENTATION:         1. This service was provided via [employer]. 2. Provider located at [address] [place] [address] [address]  [phone].  3. [facility] provider: Ez White PA-C.  4. Identify all parties in room with patient during [facility] visit:  No one else  5. After connecting through [employer], patient was identified by name and date of birth. Patient was then informed that this was a Telemedicine visit and that the exam was being conducted confidentially over secure lines. My office door was closed. No one else was in the room. Patient acknowledged consent and understanding of privacy and security of the Telemedicine visit. I informed the patient that I have reviewed their record in Epic and presented the opportunity for them to ask any questions regarding the visit today. The patient agreed to participate. VITALS: Heart Rate: 65  BPM, Respiratory Rate: 18 RPM, Temperature  96.5 ° F, Blood Pressure Unavailable mmHg, Pulse Ox  99  % on RA    HPI  Patient states that he tested positive for covid on Sunday. He has some chest tightness, SOB. He denies any Fevers, chills,nausea, vomiting. He tried mucinex d. He denies chest pain. If he is up for a long period of time he gets SOB. He does not feel SOB at home. He is vaccinated for covid but no boosters. His symptoms started Friday night 11/17. He is on day 5. It is mainly in his chest.  The congestion is improving in his sinuses. He feels like he is wheezing at times. Review of Systems   Constitutional: Negative. HENT:  Positive for congestion. Respiratory:  Positive for chest tightness and shortness of breath. Gastrointestinal: Negative. Musculoskeletal: Negative. Neurological: Negative. Psychiatric/Behavioral: Negative. Physical Exam  Constitutional:       General: He is not in acute distress.      Appearance: Normal appearance. He is not ill-appearing, toxic-appearing or diaphoretic. HENT:      Head: Normocephalic and atraumatic. Pulmonary:      Effort: Pulmonary effort is normal. No respiratory distress. Breath sounds: No wheezing. Comments: Talking in complete sentences, no audible wheezing. Skin:     General: Skin is warm and dry. Neurological:      General: No focal deficit present. Mental Status: He is alert and oriented to person, place, and time. Psychiatric:         Mood and Affect: Mood normal.         Behavior: Behavior normal.         Diagnoses and all orders for this visit:    COVID-19  -     albuterol (Proventil HFA) 90 mcg/act inhaler; Inhale 2 puffs every 6 (six) hours as needed for wheezing      Patient Instructions   Your COVID test is positive. Please stay quarantined/isolation in your home. Do not interact with your family or other people in the community. You must stay isolation/quarantine for a minimum of 5 days after symptoms have begun and be symptom-free (without fever) for 24 hours before you go out of quarantine/isolation. Then follow strict mask wearing for an additional 5 days. The people you live with or people should follow CDC recommendations regarding protocols. [email or web address]    Take Vitamin D3 2000 units daily. 18yrs and older only    Please avoid in-store purchasing of supplies and medications    Please self-prone (sleep on your stomach if possible).     Please call this department with any questions or concerns    Call your family doctor when you receive these results    Return to the ED with any worsening symptoms

## 2023-11-22 NOTE — LETTER
November 22, 2023     Patient: Barak Ingram  YOB: 1990  Date of Visit: 11/22/2023      To Whom it May Concern:    Cecille Palumbo is under my professional care. Connie Fuentes was seen in my office on 11/22/2023. Connie Fuentes  Please excuse him from work  11/20- 11/26 Patient may return to work 11/27/2023 and when fever free for 24 hours without the use of a fever reducing agent. Upon return, the patient must then adhere to strict masking for an additional  1 days 11/28/23. If you have any questions or concerns, please don't hesitate to call.          Sincerely,          Rl Boswell PA-C        CC: No Recipients

## 2023-11-22 NOTE — CARE ANYWHERE EVISITS
Visit Summary for Paulina JANG - Gender: Male - Date of Birth: [id number]  Date: [id number] - Duration: 9 minutes  Patient: Paulina JANG  Provider: Alis Ortega PA-C    Patient Contact Information  Address  Nick jackson; [address] [place] [address]  [phone]    Visit Topics  Covid [Added Alma Hagen - 3627-01-01]    Triage Questions   What is your current physical address in the event of a medical emergency? Answer []  Are you allergic to any medications? Answer []  Are you now or could you be pregnant? Answer []  Do you have any immune system compromise or chronic lung   disease? Answer []  Do you have any vulnerable family members in the home (infant, pregnant, cancer, elderly)? Answer []     Conversation Transcripts  [0A][0A] [Notification] You are connected with Alis Ortega PA-C, Urgent Care Specialist.[0A][Notification] MEL Nelson is located in [place]. [0A][Notification] MEL Nelson has shared health history. Winston Clements .[0A]    Diagnosis  COVID-19    Procedures  Value: [id number] Code: CPT-4 UNLISTED E&M SERVICE    Medications Prescribed    No prescriptions ordered    Electronically signed by: Andreia Hoover(NPI [id number])

## 2024-03-15 ENCOUNTER — OFFICE VISIT (OUTPATIENT)
Dept: OBGYN CLINIC | Facility: CLINIC | Age: 34
End: 2024-03-15
Payer: COMMERCIAL

## 2024-03-15 ENCOUNTER — TELEPHONE (OUTPATIENT)
Age: 34
End: 2024-03-15

## 2024-03-15 VITALS
WEIGHT: 190 LBS | DIASTOLIC BLOOD PRESSURE: 80 MMHG | HEIGHT: 71 IN | BODY MASS INDEX: 26.6 KG/M2 | SYSTOLIC BLOOD PRESSURE: 140 MMHG

## 2024-03-15 DIAGNOSIS — S60.222A CONTUSION OF LEFT HAND, INITIAL ENCOUNTER: Primary | ICD-10-CM

## 2024-03-15 PROCEDURE — 99214 OFFICE O/P EST MOD 30 MIN: CPT | Performed by: PHYSICIAN ASSISTANT

## 2024-03-15 NOTE — PROGRESS NOTES
"Patient Name:  Valeriy Larson  MRN:  [id number]    Assessment & Plan     Left hand contusion after crushing injury 3/14/2024.  Reviewed radiographs with the patient which do not reveal any signs of fracture at this time.  Patient has likely sustained a contusion of his second and third metacarpal bones.  Universal wrist brace provided today for comfort.  Wean from brace as pain improves.  Tylenol as needed for pain.  Patient is allergic to ibuprofen and cannot take NSAIDs.  Ice as needed.  Gradually return to activities as tolerated.  Follow-up in 6 weeks with primary care sports medicine.    Chief Complaint     Left hand injury    History of the Present Illness     Valeriy Larson is a 33 y.o. ambidextrous male reports to the office today for evaluation of his left hand.  Patient sustained a crushing injury to his left hand last evening.  He states a cylinder from his  was about to fall over.  He states he caught the cylinder but this resulted in his left hand being crushed between the cylinder and a toolbox.  He noted significant pain after this occurred as well as a superficial abrasion over the dorsum of the hand.  Currently he notes persistent pain in the dorsum of the hand with any use and movement.  He notes weakness due to the pain.  No instability.  No numbness or tingling.  He denies any redness or drainage over the abrasion.  He has been taking Tylenol with limited improvement.  He is unable to take NSAIDs due to allergy.    Physical Exam     /80   Ht 5' 11\" (1.803 m)   Wt 86.2 kg (190 lb)   BMI 26.50 kg/m²     Left hand: No gross deformity.  Small superficial abrasion over the dorsum of the hand without surrounding erythema or drainage.  There is soft tissue swelling over the dorsum of the hand.  There is tenderness over the second and third metacarpals.  No tenderness first fourth and fifth metacarpals.  No tenderness over the carpus and scaphoid.  Full wrist range of motion without " "pain.  No tenderness over the distal radius and distal ulna.  Near full composite fist formation with pain appreciated in the hand.  Sensation intact median ulnar and radial nerves.  2+ radial pulse.    Eyes: Anicteric sclerae.  ENT: Trachea midline.  Lungs: Normal respiratory effort.  CV: Capillary refill is less than 2 seconds.  Skin: Intact without erythema.  Lymph: No palpable lymphadenopathy.  Neuro: Sensation is grossly intact to light touch.  Psych: Mood and affect are appropriate.    Data Review     I have personally reviewed pertinent films in PACS, and my interpretation follows:    X-rays left hand 3/15/2024: No acute osseous abnormality.  No fracture or dislocation.  No significant degenerative changes.    Past Medical History:   Diagnosis Date    COVID 01/09/2022    Fever,fatigue    GERD (gastroesophageal reflux disease)     rarely    PONV (postoperative nausea and vomiting)     3/11/21 Pt states \"deals with slight nausea after anesthesia.\"       Past Surgical History:   Procedure Laterality Date    ANKLE SURGERY Bilateral     ARTHROSCOPY KNEE Right 3/1/2022    Procedure: RIGHT KNEE ARTHROSCOPY, ARTHROSCOPIC REMOVAL OF HARDWARE;  Surgeon: Kyle Perdue DO;  Location:  MAIN OR;  Service: Orthopedics    CARPAL TUNNEL RELEASE Right     FL INJECTION RIGHT KNEE (ARTHROGRAM)  7/21/2021    FL INJECTION RIGHT KNEE (ARTHROGRAM)  8/24/2022    KNEE ARTHROSCOPY Right     KNEE SURGERY Right     AL ARTHROSCOPY KNEE W/MENISCUS RPR MEDIAL/LATERAL Right 3/16/2021    Procedure: ARTHROSCOPIC LATERAL MENISCUS REPAIR;  Surgeon: Kyle Perdue DO;  Location:  MAIN OR;  Service: Orthopedics    AL ARTHRS KNE SURG W/MENISCECTOMY MED/LAT W/SHVG Right 10/25/2022    Procedure: MENISCECTOMY LATERAL;  Surgeon: Kyle Perdue DO;  Location:  MAIN OR;  Service: Orthopedics    AL FASCIOTOMY ILIOTIBIAL OPEN Right 10/25/2022    Procedure: RELEASE ILIOTIBIAL BAND;  Surgeon: Kyle Perdue DO;  Location:  MAIN OR;  Service: " Orthopedics    NH NEUROPLASTY &/TRANSPOS MEDIAN NRV CARPAL TUNNE Right 9/20/2018    Procedure: RELEASE CARPAL TUNNEL;  Surgeon: Ruel Campbell MD;  [facility];  Service: Orthopedics    SYNOVECTOMY Right 9/20/2018    Procedure: TENOSYNOVECTOMY Flexor digitorum superficialis to index, long, ring and small finger; Flexor digitorum profundus to index, long, ring and small finger; Flexor pollicis longus;  Surgeon: Ruel Campbell MD;  Location: [facility];  Service: Orthopedics    TONSILLECTOMY AND ADENOIDECTOMY         Allergies   Allergen Reactions    Ibuprofen Other (See Comments)     Mouth sores         Current Outpatient Medications on File Prior to Visit   Medication Sig Dispense Refill    albuterol (Proventil HFA) 90 mcg/act inhaler Inhale 2 puffs every 6 (six) hours as needed for wheezing 6.7 g 0    benzonatate (TESSALON PERLES) 100 mg capsule Take 1 capsule (100 mg total) by mouth 3 (three) times a day as needed for cough 20 capsule 0    DAILY MULTIPLE VITAMINS PO Take by mouth daily      fluticasone (FLONASE) 50 mcg/act nasal spray 1 spray into each nostril daily 9.9 mL 0    acetaminophen (TYLENOL) 500 mg tablet Take 500 mg by mouth every 6 (six) hours as needed for mild pain (Patient not taking: Reported on 7/14/2023)      celecoxib (CeleBREX) 200 mg capsule Take 1 capsule (200 mg total) by mouth daily (Patient not taking: Reported on 1/16/2023) 90 capsule 1    dicyclomine (BENTYL) 10 mg capsule Take 1 capsule (10 mg total) by mouth 3 (three) times a day before meals (Patient not taking: Reported on 9/19/2023) 21 capsule 0    hydrocortisone (ANUSOL-HC) 25 mg suppository Insert 1 suppository (25 mg total) into the rectum 2 (two) times a day for 3 days (Patient not taking: Reported on 10/14/2022) 12 suppository 0    ondansetron (ZOFRAN-ODT) 8 mg disintegrating tablet Take 1 tablet (8 mg total) by mouth every 8 (eight) hours as needed for nausea or vomiting for up to 2 days 6 tablet 0     No  current facility-administered medications on file prior to visit.       Social History     Tobacco Use    Smoking status: Never     Passive exposure: Never    Smokeless tobacco: Never    Tobacco comments:     Denies   Vaping Use    Vaping status: Never Used   Substance Use Topics    Alcohol use: Yes     Alcohol/week: 3.0 standard drinks of alcohol     Types: 3 Cans of beer per week     Comment: social    Drug use: No     Comment: denies       Family History   Problem Relation Age of Onset    Arthritis Mother     No Known Problems Father        Review of Systems     As stated in the HPI. All other systems reviewed and are negative.

## 2024-03-15 NOTE — TELEPHONE ENCOUNTER
Caller: Patient     Doctor/Office: Tiana Walls    #: [phone]    Work or school note: Work     Asked for a note to return to work, please place into mychart

## 2024-05-15 ENCOUNTER — OFFICE VISIT (OUTPATIENT)
Dept: FAMILY MEDICINE CLINIC | Facility: CLINIC | Age: 34
End: 2024-05-15
Payer: COMMERCIAL

## 2024-05-15 VITALS
RESPIRATION RATE: 16 BRPM | HEART RATE: 89 BPM | TEMPERATURE: 98 F | BODY MASS INDEX: 26.96 KG/M2 | HEIGHT: 69 IN | SYSTOLIC BLOOD PRESSURE: 130 MMHG | WEIGHT: 182 LBS | DIASTOLIC BLOOD PRESSURE: 80 MMHG | OXYGEN SATURATION: 98 %

## 2024-05-15 DIAGNOSIS — Z13.1 SCREENING FOR DIABETES MELLITUS: ICD-10-CM

## 2024-05-15 DIAGNOSIS — Z11.4 ENCOUNTER FOR SCREENING FOR HIV: ICD-10-CM

## 2024-05-15 DIAGNOSIS — Z13.6 SCREENING FOR CARDIOVASCULAR CONDITION: ICD-10-CM

## 2024-05-15 DIAGNOSIS — Z00.00 ANNUAL PHYSICAL EXAM: Primary | ICD-10-CM

## 2024-05-15 DIAGNOSIS — Z11.59 ENCOUNTER FOR HEPATITIS C SCREENING TEST FOR LOW RISK PATIENT: ICD-10-CM

## 2024-05-15 PROCEDURE — 99395 PREV VISIT EST AGE 18-39: CPT | Performed by: FAMILY MEDICINE

## 2024-05-21 NOTE — PROGRESS NOTES
Adult Annual Physical  Name: Valeriy Larson      : 1990      MRN: [id number]  Encounter Provider: Fransico Wilson DO  Encounter Date: 5/15/2024   Encounter department: [facility]    Assessment & Plan   1. Annual physical exam  2. Screening for diabetes mellitus  -     Comprehensive metabolic panel; Future  3. Screening for cardiovascular condition  -     Lipid Panel With Direct LDL; Future  4. Encounter for hepatitis C screening test for low risk patient  -     Hepatitis C antibody; Future  5. Encounter for screening for HIV  -     HIV 1/2 AG/AB w Reflex [facility] for 2 yr old and above; Future    Immunizations and preventive care screenings were discussed with patient today. Appropriate education was printed on patient's after visit summary.    Counseling:  Alcohol/drug use: discussed moderation in alcohol intake, the recommendations for healthy alcohol use, and avoidance of illicit drug use.  Dental Health: discussed importance of regular tooth brushing, flossing, and dental visits.  Injury prevention: discussed safety/seat belts, safety helmets, smoke detectors, carbon dioxide detectors, and smoking near bedding or upholstery.  Sexual health: discussed sexually transmitted diseases, partner selection, use of condoms, avoidance of unintended pregnancy, and contraceptive alternatives.  Exercise: the importance of regular exercise/physical activity was discussed. Recommend exercise 3-5 times per week for at least 30 minutes.          History of Present Illness     Adult Annual Physical:  Patient presents for annual physical.     Diet and Physical Activity:  - Diet/Nutrition: well balanced diet and consuming 3-5 servings of fruits/vegetables daily.  - Exercise: walking, moderate cardiovascular exercise and 3-4 times a week on average.    Depression Screening:  - PHQ-2 Score: 2    General Health:  - Sleep: sleeps well and 7-8 hours of sleep on average.  - Hearing: normal hearing bilateral  ears.  - Vision: goes for regular eye exams.  - Dental: regular dental visits.     Health:  - History of STDs: no.   - Urinary symptoms: none.     Advanced Care Planning:  - Has an advanced directive?: no    - Has a durable medical POA?: no    - ACP document given to patient?: no      Review of Systems   Constitutional:  Negative for chills and fever.   HENT:  Negative for ear pain and sore throat.    Eyes:  Negative for pain and visual disturbance.   Respiratory:  Negative for cough and shortness of breath.    Cardiovascular:  Negative for chest pain and palpitations.   Gastrointestinal:  Negative for abdominal pain and vomiting.   Genitourinary:  Negative for dysuria and hematuria.   Musculoskeletal:  Negative for arthralgias and back pain.   Skin:  Negative for color change and rash.   Neurological:  Negative for seizures and syncope.   Psychiatric/Behavioral:  Negative for confusion and sleep disturbance. The patient is not nervous/anxious.    All other systems reviewed and are negative.    Current Outpatient Medications on File Prior to Visit   Medication Sig Dispense Refill    DAILY MULTIPLE VITAMINS PO Take by mouth daily      fluticasone (FLONASE) 50 mcg/act nasal spray 1 spray into each nostril daily 9.9 mL 0    [DISCONTINUED] benzonatate (TESSALON PERLES) 100 mg capsule Take 1 capsule (100 mg total) by mouth 3 (three) times a day as needed for cough (Patient not taking: Reported on 5/15/2024) 20 capsule 0     No current facility-administered medications on file prior to visit.      Social History     Tobacco Use    Smoking status: Never     Passive exposure: Never    Smokeless tobacco: Never    Tobacco comments:     Denies   Vaping Use    Vaping status: Never Used   Substance and Sexual Activity    Alcohol use: Yes     Alcohol/week: 3.0 standard drinks of alcohol     Types: 3 Cans of beer per week     Comment: social    Drug use: No     Comment: denies    Sexual activity: Yes     Partners: Female      "Birth control/protection: Condom Male       Objective     /80 (BP Location: Left arm, Patient Position: Sitting, Cuff Size: Standard)   Pulse 89   Temp 98 °F (36.7 °C) (Tympanic)   Resp 16   Ht 5' 9.29\" (1.76 m)   Wt 82.6 kg (182 lb)   SpO2 98%   BMI 26.65 kg/m²     Physical Exam  Vitals and nursing note reviewed.   Constitutional:       General: He is not in acute distress.     Appearance: Normal appearance.   HENT:      Head: Normocephalic and atraumatic.      Right Ear: Tympanic membrane and external ear normal.      Left Ear: Tympanic membrane and external ear normal.      Nose: Nose normal.      Mouth/Throat:      Mouth: Mucous membranes are moist.   Eyes:      Extraocular Movements: Extraocular movements intact.      Conjunctiva/sclera: Conjunctivae normal.      Pupils: Pupils are equal, round, and reactive to light.   Cardiovascular:      Rate and Rhythm: Normal rate and regular rhythm.      Pulses: Normal pulses.      Heart sounds: Normal heart sounds. No murmur heard.  Pulmonary:      Effort: Pulmonary effort is normal.      Breath sounds: Normal breath sounds. No wheezing, rhonchi or rales.   Abdominal:      General: Bowel sounds are normal.      Palpations: Abdomen is soft.      Tenderness: There is no abdominal tenderness. There is no guarding.   Musculoskeletal:         General: Normal range of motion.      Cervical back: Normal range of motion.      Right lower leg: No edema.      Left lower leg: No edema.   Lymphadenopathy:      Cervical: No cervical adenopathy.   Skin:     General: Skin is warm.      Capillary Refill: Capillary refill takes less than 2 seconds.   Neurological:      General: No focal deficit present.      Mental Status: He is alert and oriented to person, place, and time.   Psychiatric:         Mood and Affect: Mood normal.         Behavior: Behavior normal.       Administrative Statements   I have spent a total time of 31 minutes on 05/21/24 In caring for this patient " including Diagnostic results, Risks and benefits of tx options, Instructions for management, Importance of tx compliance, Risk factor reductions, Impressions, Counseling / Coordination of care, and Obtaining or reviewing history  .

## 2024-06-17 ENCOUNTER — AMB VIDEO VISIT (OUTPATIENT)
Dept: OTHER | Facility: HOSPITAL | Age: 34
End: 2024-06-17
Payer: COMMERCIAL

## 2024-06-17 DIAGNOSIS — S39.012A LOW BACK STRAIN, INITIAL ENCOUNTER: Primary | ICD-10-CM

## 2024-06-17 PROCEDURE — 99212 OFFICE O/P EST SF 10 MIN: CPT | Performed by: NURSE PRACTITIONER

## 2024-06-17 RX ORDER — CYCLOBENZAPRINE HCL 10 MG
10 TABLET ORAL 3 TIMES DAILY PRN
Qty: 15 TABLET | Refills: 0 | Status: SHIPPED | OUTPATIENT
Start: 2024-06-17 | End: 2024-06-22

## 2024-06-17 NOTE — LETTER
June 17, 2024     Valeriy Larson    June 17, 2024     Patient: Valeriy Larson   YOB: 1990   Date of Visit: 6/17/2024       To Whom it May Concern:    Valeriy Larson is under my professional care. He was seen virtually on 6/17/2024. He may return to work on 06/19/2024 .    If you have any questions or concerns, please don't hesitate to call.         Sincerely,          HARRIS Londono        CC: No Recipients

## 2024-06-17 NOTE — CARE ANYWHERE EVISITS
Visit Summary for MEL JANG - Gender: Male - Date of Birth: 1990  Date: [id number] - Duration: 9 minutes  Patient: MEL JANG  Provider: Amee IGLESIAS    Patient Contact Information  Address  [address] [place] [address]; [place] [address]  [phone]    Visit Topics    Triage Questions   What is your current physical address in the event of a medical emergency? Answer []  Are you allergic to any medications? Answer []  Are you now or could you be pregnant? Answer []  Do you have any immune system compromise or chronic lung   disease? Answer []  Do you have any vulnerable family members in the home (infant, pregnant, cancer, elderly)? Answer []     Conversation Transcripts  [0A][0A] [Notification] You are connected with Amee IGLESIAS, Urgent Care Specialist.[0A][Notification] MEL JANG is located in [place].[0A][Notification] MEL JANG has shared health history...[0A]    Diagnosis  Strain of muscle, fascia and tendon of lower back, init    Procedures  Value: [id number] Code: CPT-4 UNLISTED E&M SERVICE    Medications Prescribed    No prescriptions ordered    Electronically signed by: Amee Fan(NPI [id number])

## 2024-06-17 NOTE — PROGRESS NOTES
"Required Documentation:  Encounter provider: HARRIS Londono    Identify all parties in room with patient during virtual visit:  No one else    The patient was identified by name and date of birth. Valeriy Larson was informed that this is a telemedicine visit and that the visit is being conducted through the Pivot platform. He agrees to proceed..  My office door was closed. No one else was in the room.  He acknowledged consent and understanding of privacy and security of the video platform. The patient has agreed to participate and understands they can discontinue the visit at any time.    Verification of patient location:  Patient is located at Home in the following state in which I hold an active license [place]    Patient is aware this is a billable service.     Reason for visit is No chief complaint on file.       Subjective  This is a 33 year old male here today for video visit.  He states 2 days ago he was playing his kids and pulled something in his back.  He states he has done this in the past and has had muscle relaxer.  He states the last time this happened was over 1 year ago.  No loss of bowel or bladder.  No numbness or tingling in legs.  No weakness in his leg.  He is having spasm on the left side of back.  There is a tender area under left shoulder bladder.  He has tried heat but ice.  He has used muscle relaxer in the past.            Past Medical History:   Diagnosis Date    COVID 01/09/2022    Fever,fatigue    GERD (gastroesophageal reflux disease)     rarely    PONV (postoperative nausea and vomiting)     3/11/21 Pt states \"deals with slight nausea after anesthesia.\"       Past Surgical History:   Procedure Laterality Date    ANKLE SURGERY Bilateral     ARTHROSCOPY KNEE Right 3/1/2022    Procedure: RIGHT KNEE ARTHROSCOPY, ARTHROSCOPIC REMOVAL OF HARDWARE;  Surgeon: Kyle Perdue DO;  Location:  MAIN OR;  Service: Orthopedics    CARPAL TUNNEL RELEASE Right     FL INJECTION " RIGHT KNEE (ARTHROGRAM)  7/21/2021    FL INJECTION RIGHT KNEE (ARTHROGRAM)  8/24/2022    KNEE ARTHROSCOPY Right     KNEE SURGERY Right     ND ARTHROSCOPY KNEE W/MENISCUS RPR MEDIAL/LATERAL Right 3/16/2021    Procedure: ARTHROSCOPIC LATERAL MENISCUS REPAIR;  Surgeon: Kyle Perdue DO;  Location:  MAIN OR;  Service: Orthopedics    ND ARTHRS KNE SURG W/MENISCECTOMY MED/LAT W/SHVG Right 10/25/2022    Procedure: MENISCECTOMY LATERAL;  Surgeon: Kyle Perdue DO;  Location:  MAIN OR;  Service: Orthopedics    ND FASCIOTOMY ILIOTIBIAL OPEN Right 10/25/2022    Procedure: RELEASE ILIOTIBIAL BAND;  Surgeon: Kyle Perdue DO;  Location:  MAIN OR;  Service: Orthopedics    ND NEUROPLASTY &/TRANSPOS MEDIAN NRV CARPAL TUNNE Right 9/20/2018    Procedure: RELEASE CARPAL TUNNEL;  Surgeon: Ruel Campbell MD;  Location:  MAIN OR;  Service: Orthopedics    SYNOVECTOMY Right 9/20/2018    Procedure: TENOSYNOVECTOMY Flexor digitorum superficialis to index, long, ring and small finger; Flexor digitorum profundus to index, long, ring and small finger; Flexor pollicis longus;  Surgeon: Ruel Campbell MD;  Location:  MAIN OR;  Service: Orthopedics    TONSILLECTOMY AND ADENOIDECTOMY          Allergies   Allergen Reactions    Ibuprofen Other (See Comments)     Mouth sores         Review of Systems   Constitutional: Negative.    Respiratory: Negative.     Cardiovascular: Negative.    Musculoskeletal:  Positive for back pain.   Skin: Negative.    Neurological:  Negative for weakness, numbness and headaches.   Hematological: Negative.    Psychiatric/Behavioral: Negative.         Video Exam    There were no vitals filed for this visit.    Physical Exam  Constitutional:       General: He is not in acute distress.     Appearance: Normal appearance. He is not toxic-appearing.   HENT:      Head: Normocephalic and atraumatic.   Pulmonary:      Effort: Pulmonary effort is normal. No respiratory distress.   Musculoskeletal:       Comments: TTP over the left mid back on self palpation.    No ttp over the spine.    Neurological:      Mental Status: He is alert and oriented to person, place, and time.   Psychiatric:         Mood and Affect: Mood normal.         Behavior: Behavior normal.         Thought Content: Thought content normal.         Judgment: Judgment normal.         Visit Time  Total Visit Duration: 8 minutes    Assessment/Plan:    Diagnoses and all orders for this visit:    Low back strain, initial encounter  -     cyclobenzaprine (FLEXERIL) 10 mg tablet; Take 1 tablet (10 mg total) by mouth 3 (three) times a day as needed for muscle spasms for up to 5 days  -     Ambulatory Referral to [facility]; Future        Patient Instructions   Rest.  Alternate ice and warm compresses as discussed.  Start muscle relaxer for muscle spasms, avoid driving while taking medication.  Tylenol as discussed.  Follow up with PCP or Ortho for further evaluation.  Go to ER with worsening symptoms.     To contact the [facility] triage team for scheduling or for questions, please call [phone] and follow prompts for [facility].    Acute Low Back Pain, Ambulatory Care   GENERAL INFORMATION:   Acute low back pain  is discomfort in your lower back area that lasts for less than 12 weeks. The word acute is used to describe pain that starts suddenly, worsens quickly, and lasts for a short time.  Common symptoms include the following:   Back stiffness or spasms    Pain down the back or side of one leg    Holding yourself in an unusual position or posture to decrease your back pain    Not being able to find a sitting position that is comfortable    Slow increase in your pain for 24 to 48 hours after you stress your back    Tenderness on your lower back or severe pain when you move your back  Seek immediate care for the following symptoms:   Severe pain    Sudden stiffness and heaviness in both buttocks down to both  legs    Numbness or weakness in one leg, or pain in both legs    Numbness in your genital area or across your lower back    Unable to control your urine or bowel movements  Treatment for acute low back pain  may include any of the following:  Medicines:      NSAIDs  help decrease swelling and pain or fever. This medicine is available with or without a doctor's order. NSAIDs can cause stomach bleeding or kidney problems in certain people. If you take blood thinner medicine, always ask your healthcare provider if NSAIDs are safe for you. Always read the medicine label and follow directions.    Muscle relaxers  help decrease muscle spasms pain.    Prescription pain medicine  may be given. Ask how to take this medicine safely.    Surgery  may be needed if your pain is severe and other treatments do not work. Surgery may be needed for conditions of the lumbar spine, such as herniated disc or spinal stenosis.  Manage your symptoms:   Sleep on a firm mattress.  If you do not have a firm mattress, have someone move your mattress to the floor for a few days. A piece of plywood under your mattress can also help make it firmer.    Apply ice  on your lower back for 15 to 20 minutes every hour or as directed. Use an ice pack, or put crushed ice in a plastic bag. Cover it with a towel. Ice helps prevent tissue damage and decreases swelling and pain. You can alternate ice and heat.    Apply heat  on your lower back for 20 to 30 minutes every 2 hours for as many days as directed. Heat helps decrease pain and muscle spasms.    Go to physical therapy.  A physical therapist teaches you exercises to help improve movement and strength, and to decrease pain.  Prevent acute low back pain:   Use proper body mechanics.      Bend at the hips and knees when you  objects. Do not bend from the waist. Use your leg muscles as you lift the load. Do not use your back. Keep the object close to your chest as you lift it. Try not to twist or  lift anything above your waist.    Change your position often when you stand for long periods of time. Rest one foot on a small box or footrest, and then switch to the other foot often.    Try not to sit for long periods of time. When you do, sit in a straight-backed chair with your feet flat on the floor. Never reach, pull, or push while you are sitting.    Exercise regularly.  Warm up before you exercise. Do exercises that strengthen your back muscles. Ask about the best exercise plan for you.    Maintain a healthy weight.  Ask your healthcare provider how much you should weigh. Ask him to help you create a weight loss plan if you are overweight.  Follow up with your healthcare provider as directed:  Return for a follow-up visit if you still have pain after 1 to 3 weeks of treatment. You may need to visit an orthopedist if your back pain lasts more than 6 to 12 weeks. Write down your questions so you remember to ask them during your visits.  CARE AGREEMENT:   You have the right to help plan your care. Learn about your health condition and how it may be treated. Discuss treatment options with your caregivers to decide what care you want to receive. You always have the right to refuse treatment. The above information is an  only. It is not intended as medical advice for individual conditions or treatments. Talk to your doctor, nurse or pharmacist before following any medical regimen to see if it is safe and effective for you.  © 2014 [employer]. Information is for End User's use only and may not be sold, redistributed or otherwise used for commercial purposes. All illustrations and images included in CareNotes® are the copyrighted property of [employer].D.A[employer]M., Inc. or [employer].

## 2024-06-17 NOTE — PATIENT INSTRUCTIONS
Rest.  Alternate ice and warm compresses as discussed.  Start muscle relaxer for muscle spasms, avoid driving while taking medication.  Tylenol as discussed.  Follow up with PCP or Ortho for further evaluation.  Go to ER with worsening symptoms.     To contact the [facility] triage team for scheduling or for questions, please call [phone] and follow prompts for [facility].    Acute Low Back Pain, Ambulatory Care   GENERAL INFORMATION:   Acute low back pain  is discomfort in your lower back area that lasts for less than 12 weeks. The word acute is used to describe pain that starts suddenly, worsens quickly, and lasts for a short time.  Common symptoms include the following:   Back stiffness or spasms    Pain down the back or side of one leg    Holding yourself in an unusual position or posture to decrease your back pain    Not being able to find a sitting position that is comfortable    Slow increase in your pain for 24 to 48 hours after you stress your back    Tenderness on your lower back or severe pain when you move your back  Seek immediate care for the following symptoms:   Severe pain    Sudden stiffness and heaviness in both buttocks down to both legs    Numbness or weakness in one leg, or pain in both legs    Numbness in your genital area or across your lower back    Unable to control your urine or bowel movements  Treatment for acute low back pain  may include any of the following:  Medicines:      NSAIDs  help decrease swelling and pain or fever. This medicine is available with or without a doctor's order. NSAIDs can cause stomach bleeding or kidney problems in certain people. If you take blood thinner medicine, always ask your healthcare provider if NSAIDs are safe for you. Always read the medicine label and follow directions.    Muscle relaxers  help decrease muscle spasms pain.    Prescription pain medicine  may be given. Ask how to take this medicine safely.    Surgery  may be  needed if your pain is severe and other treatments do not work. Surgery may be needed for conditions of the lumbar spine, such as herniated disc or spinal stenosis.  Manage your symptoms:   Sleep on a firm mattress.  If you do not have a firm mattress, have someone move your mattress to the floor for a few days. A piece of plywood under your mattress can also help make it firmer.    Apply ice  on your lower back for 15 to 20 minutes every hour or as directed. Use an ice pack, or put crushed ice in a plastic bag. Cover it with a towel. Ice helps prevent tissue damage and decreases swelling and pain. You can alternate ice and heat.    Apply heat  on your lower back for 20 to 30 minutes every 2 hours for as many days as directed. Heat helps decrease pain and muscle spasms.    Go to physical therapy.  A physical therapist teaches you exercises to help improve movement and strength, and to decrease pain.  Prevent acute low back pain:   Use proper body mechanics.      Bend at the hips and knees when you  objects. Do not bend from the waist. Use your leg muscles as you lift the load. Do not use your back. Keep the object close to your chest as you lift it. Try not to twist or lift anything above your waist.    Change your position often when you stand for long periods of time. Rest one foot on a small box or footrest, and then switch to the other foot often.    Try not to sit for long periods of time. When you do, sit in a straight-backed chair with your feet flat on the floor. Never reach, pull, or push while you are sitting.    Exercise regularly.  Warm up before you exercise. Do exercises that strengthen your back muscles. Ask about the best exercise plan for you.    Maintain a healthy weight.  Ask your healthcare provider how much you should weigh. Ask him to help you create a weight loss plan if you are overweight.  Follow up with your healthcare provider as directed:  Return for a follow-up visit if you still  have pain after 1 to 3 weeks of treatment. You may need to visit an orthopedist if your back pain lasts more than 6 to 12 weeks. Write down your questions so you remember to ask them during your visits.  CARE AGREEMENT:   You have the right to help plan your care. Learn about your health condition and how it may be treated. Discuss treatment options with your caregivers to decide what care you want to receive. You always have the right to refuse treatment. The above information is an  only. It is not intended as medical advice for individual conditions or treatments. Talk to your doctor, nurse or pharmacist before following any medical regimen to see if it is safe and effective for you.  © 2014 [employer]. Information is for End User's use only and may not be sold, redistributed or otherwise used for commercial purposes. All illustrations and images included in CareNotes® are the copyrighted property of [employer]., Inc. or [employer].

## 2024-06-18 ENCOUNTER — TELEPHONE (OUTPATIENT)
Dept: PHYSICAL THERAPY | Facility: OTHER | Age: 34
End: 2024-06-18

## 2024-06-21 NOTE — TELEPHONE ENCOUNTER
Call placed to the patient per [facility] referral.     V/M left for patient to call back. Phone number and hours of business provided.     This is the 2nd attempt to reach the patient. Will defer referral per protocol.

## 2024-06-28 NOTE — TELEPHONE ENCOUNTER
Call placed to the patient per [facility] referral.     V/M left for patient to call back. Phone number and hours of business provided.     This is the 3rd attempt to reach the patient. Will close referral per protocol.

## 2024-12-30 ENCOUNTER — TELEMEDICINE (OUTPATIENT)
Dept: OTHER | Facility: HOSPITAL | Age: 34
End: 2024-12-30
Payer: COMMERCIAL

## 2024-12-30 VITALS — TEMPERATURE: 97.8 F

## 2024-12-30 DIAGNOSIS — R11.2 NAUSEA AND VOMITING, UNSPECIFIED VOMITING TYPE: Primary | ICD-10-CM

## 2024-12-30 PROCEDURE — 99213 OFFICE O/P EST LOW 20 MIN: CPT | Performed by: PHYSICIAN ASSISTANT

## 2024-12-30 RX ORDER — ONDANSETRON 4 MG/1
4 TABLET, FILM COATED ORAL EVERY 8 HOURS PRN
Qty: 12 TABLET | Refills: 0 | Status: SHIPPED | OUTPATIENT
Start: 2024-12-30

## 2024-12-30 NOTE — PROGRESS NOTES
Virtual Regular Visit  Name: Valeriy Larson      : 1990      MRN: [id number]  Encounter Provider: Bre Leo PA-C  Encounter Date: 2024   Encounter department: VIRTUAL CARE       Verification of patient location:  Patient is located at Home in the following state in which I hold an active license [place] :  Assessment & Plan  Nausea and vomiting, unspecified vomiting type    Orders:    ondansetron (ZOFRAN) 4 mg tablet; Take 1 tablet (4 mg total) by mouth every 8 (eight) hours as needed for nausea or vomiting  Discussed with patient that he likely has the GI bug is going around.  We did discuss previous needing to the ER for fluids and to rule out electrolyte abnormalities.  Patient states he is not interested in going to the ER.  He notes he was able to keep electrolyte fluids down with Zofran.  A prescription for Zofran has been called in.  I discussed with patient should he develop severe abdominal pain, dry mouth, lightheadedness, dizziness, shortness of breath or chest pain, vomiting blood or black or bloody stools he should proceed to the ER immediately.      Encounter provider Bre Leo PA-C    The patient was identified by name and date of birth. Valeriy Larson was informed that this is a telemedicine visit and that the visit is being conducted through the Epic Embedded platform. He agrees to proceed..  My office door was closed. No one else was in the room.  He acknowledged consent and understanding of privacy and security of the video platform. The patient has agreed to participate and understands they can discontinue the visit at any time.    Patient is aware this is a billable service.     History of Present Illness     This is a 34-year-old male with a past medical history of GERD here c/o vomiting since Friday.  States he took some Zofran which she had leftover which allowed him to keep Gatorade down.  He states he took his last Zofran last night.  He did vomit at 7 AM this  morning.  He denies any bloody vomit or black or bloody stools.  He was able to sleep the entire night without vomiting.  He states he last had diarrhea yesterday morning.  He denies any black or bloody stools.  He does complain of headache and some bodyaches.  Denies any lightheadedness or dizziness.  He denies shortness of breath, chest pain, abdominal pain.       Review of Systems   Constitutional:  Negative for fever.   Respiratory:  Negative for shortness of breath.    Cardiovascular:  Negative for chest pain.   Gastrointestinal:  Positive for diarrhea, nausea and vomiting. Negative for abdominal pain and blood in stool.       Objective   There were no vitals taken for this visit.    Physical Exam  Constitutional:       General: He is not in acute distress.     Appearance: Normal appearance. He is ill-appearing. He is not toxic-appearing or diaphoretic.   HENT:      Nose: Nose normal.   Eyes:      Conjunctiva/sclera: Conjunctivae normal.   Pulmonary:      Effort: Pulmonary effort is normal.      Comments: Is able to speak in multiple full sentences without needing to break or pause.  Abdominal:      Tenderness: There is no abdominal tenderness.   Skin:     Comments: No rashes noted on head or neck   Neurological:      General: No focal deficit present.      Mental Status: He is alert and oriented to person, place, and time.   Psychiatric:         Mood and Affect: Mood normal.         Behavior: Behavior normal.         Visit Time  Total Visit Duration: 8 min    done

## 2024-12-30 NOTE — LETTER
December 30, 2024     Patient: Valeriy Larson  YOB: 1990  Date of Visit: 12/30/2024      To Whom it May Concern:    Valeriy Larson is under my professional care. Valeriy was seen in my office on 12/30/2024. Valeriy may return to work on 1/1/25 .    If you have any questions or concerns, please don't hesitate to call.         Sincerely,          Bre Leo PA-C        CC: No Recipients

## 2025-04-21 ENCOUNTER — HOSPITAL ENCOUNTER (EMERGENCY)
Facility: HOSPITAL | Age: 35
Discharge: HOME | End: 2025-04-21
Attending: EMERGENCY MEDICINE
Payer: COMMERCIAL

## 2025-04-21 ENCOUNTER — APPOINTMENT (EMERGENCY)
Dept: RADIOLOGY | Facility: HOSPITAL | Age: 35
End: 2025-04-21
Payer: COMMERCIAL

## 2025-04-21 VITALS
SYSTOLIC BLOOD PRESSURE: 146 MMHG | OXYGEN SATURATION: 98 % | DIASTOLIC BLOOD PRESSURE: 84 MMHG | RESPIRATION RATE: 17 BRPM | HEART RATE: 77 BPM | TEMPERATURE: 98.4 F

## 2025-04-21 DIAGNOSIS — R07.9 CHEST PAIN: Primary | ICD-10-CM

## 2025-04-21 DIAGNOSIS — S39.012A LOW BACK STRAIN, INITIAL ENCOUNTER: ICD-10-CM

## 2025-04-21 LAB
ALBUMIN SERPL BCG-MCNC: 5.1 G/DL (ref 3.5–5)
ALP SERPL-CCNC: 63 U/L (ref 34–104)
ALT SERPL W P-5'-P-CCNC: 26 U/L (ref 7–52)
ANION GAP SERPL CALCULATED.3IONS-SCNC: 10 MMOL/L (ref 4–13)
AST SERPL W P-5'-P-CCNC: 23 U/L (ref 13–39)
BASOPHILS # BLD AUTO: 0.05 THOUSANDS/ÂΜL (ref 0–0.1)
BASOPHILS NFR BLD AUTO: 1 % (ref 0–1)
BILIRUB SERPL-MCNC: 1.89 MG/DL (ref 0.2–1)
BUN SERPL-MCNC: 15 MG/DL (ref 5–25)
CALCIUM SERPL-MCNC: 9.9 MG/DL (ref 8.4–10.2)
CARDIAC TROPONIN I PNL SERPL HS: <2 NG/L (ref ?–50)
CARDIAC TROPONIN I PNL SERPL HS: <2 NG/L (ref ?–50)
CHLORIDE SERPL-SCNC: 100 MMOL/L (ref 96–108)
CO2 SERPL-SCNC: 31 MMOL/L (ref 21–32)
CREAT SERPL-MCNC: 0.94 MG/DL (ref 0.6–1.3)
EOSINOPHIL # BLD AUTO: 0.1 THOUSAND/ÂΜL (ref 0–0.61)
EOSINOPHIL NFR BLD AUTO: 1 % (ref 0–6)
ERYTHROCYTE [DISTWIDTH] IN BLOOD BY AUTOMATED COUNT: 12.8 % (ref 11.6–15.1)
GFR SERPL CREATININE-BSD FRML MDRD: 106 ML/MIN/1.73SQ M
GLUCOSE SERPL-MCNC: 108 MG/DL (ref 65–140)
HCT VFR BLD AUTO: 43.1 % (ref 36.5–49.3)
HGB BLD-MCNC: 14.9 G/DL (ref 12–17)
IMM GRANULOCYTES # BLD AUTO: 0.01 THOUSAND/UL (ref 0–0.2)
IMM GRANULOCYTES NFR BLD AUTO: 0 % (ref 0–2)
LYMPHOCYTES # BLD AUTO: 3.34 THOUSANDS/ÂΜL (ref 0.6–4.47)
LYMPHOCYTES NFR BLD AUTO: 47 % (ref 14–44)
MAGNESIUM SERPL-MCNC: 2.2 MG/DL (ref 1.9–2.7)
MCH RBC QN AUTO: 29.8 PG (ref 26.8–34.3)
MCHC RBC AUTO-ENTMCNC: 34.6 G/DL (ref 31.4–37.4)
MCV RBC AUTO: 86 FL (ref 82–98)
MONOCYTES # BLD AUTO: 0.6 THOUSAND/ÂΜL (ref 0.17–1.22)
MONOCYTES NFR BLD AUTO: 8 % (ref 4–12)
NEUTROPHILS # BLD AUTO: 3.05 THOUSANDS/ÂΜL (ref 1.85–7.62)
NEUTS SEG NFR BLD AUTO: 43 % (ref 43–75)
NRBC BLD AUTO-RTO: 0 /100 WBCS
PLATELET # BLD AUTO: 279 THOUSANDS/UL (ref 149–390)
PMV BLD AUTO: 9.2 FL (ref 8.9–12.7)
POTASSIUM SERPL-SCNC: 3.5 MMOL/L (ref 3.5–5.3)
PROT SERPL-MCNC: 7.6 G/DL (ref 6.4–8.4)
RBC # BLD AUTO: 5 MILLION/UL (ref 3.88–5.62)
SODIUM SERPL-SCNC: 141 MMOL/L (ref 135–147)
TSH SERPL DL<=0.05 MIU/L-ACNC: 2.53 UIU/ML (ref 0.45–4.5)
WBC # BLD AUTO: 7.15 THOUSAND/UL (ref 4.31–10.16)

## 2025-04-21 PROCEDURE — 36415 COLL VENOUS BLD VENIPUNCTURE: CPT | Performed by: EMERGENCY MEDICINE

## 2025-04-21 PROCEDURE — 99285 EMERGENCY DEPT VISIT HI MDM: CPT

## 2025-04-21 PROCEDURE — 85025 COMPLETE CBC W/AUTO DIFF WBC: CPT | Performed by: EMERGENCY MEDICINE

## 2025-04-21 PROCEDURE — 80053 COMPREHEN METABOLIC PANEL: CPT | Performed by: EMERGENCY MEDICINE

## 2025-04-21 PROCEDURE — 99284 EMERGENCY DEPT VISIT MOD MDM: CPT | Performed by: EMERGENCY MEDICINE

## 2025-04-21 PROCEDURE — 93005 ELECTROCARDIOGRAM TRACING: CPT

## 2025-04-21 PROCEDURE — 71045 X-RAY EXAM CHEST 1 VIEW: CPT

## 2025-04-21 PROCEDURE — 96374 THER/PROPH/DIAG INJ IV PUSH: CPT

## 2025-04-21 PROCEDURE — 83735 ASSAY OF MAGNESIUM: CPT | Performed by: EMERGENCY MEDICINE

## 2025-04-21 PROCEDURE — 84484 ASSAY OF TROPONIN QUANT: CPT | Performed by: EMERGENCY MEDICINE

## 2025-04-21 PROCEDURE — 84443 ASSAY THYROID STIM HORMONE: CPT | Performed by: EMERGENCY MEDICINE

## 2025-04-21 RX ORDER — ACETAMINOPHEN 10 MG/ML
1000 INJECTION, SOLUTION INTRAVENOUS ONCE
Status: COMPLETED | OUTPATIENT
Start: 2025-04-21 | End: 2025-04-21

## 2025-04-21 RX ORDER — CYCLOBENZAPRINE HCL 10 MG
10 TABLET ORAL 3 TIMES DAILY PRN
Qty: 15 TABLET | Refills: 0 | Status: SHIPPED | OUTPATIENT
Start: 2025-04-21 | End: 2025-04-26

## 2025-04-21 RX ORDER — ACETAMINOPHEN 325 MG/1
650 TABLET ORAL EVERY 6 HOURS PRN
Qty: 30 TABLET | Refills: 0 | Status: SHIPPED | OUTPATIENT
Start: 2025-04-21

## 2025-04-21 RX ADMIN — ACETAMINOPHEN 1000 MG: 10 INJECTION, SOLUTION INTRAVENOUS at 21:03

## 2025-04-21 NOTE — Clinical Note
Valeriy Larson was seen and treated in our emergency department on 4/21/2025.                Diagnosis:     Valeriy  may return to work on return date.    He may return on this date: 04/23/2025         If you have any questions or concerns, please don't hesitate to call.      Pippa Padilla, DO    ______________________________           _______________          _______________  Hospital Representative                              Date                                Time

## 2025-04-22 NOTE — ED PROVIDER NOTES
Time reflects when diagnosis was documented in both MDM as applicable and the Disposition within this note       Time User Action Codes Description Comment    4/21/2025 11:25 PM Marybeladonis Pippa K Add [R07.9] Chest pain     4/21/2025 11:25 PM Pippa Padilla Add [S39.012A] Low back strain, initial encounter           ED Disposition       ED Disposition   Discharge    Condition   Stable    Date/Time   Mon Apr 21, 2025 11:25 PM    Comment   Valeriy Larson discharge to home/self care.                   Assessment & Plan       Medical Decision Making  Differential diagnosis includes but is not limited to musculoskeletal pain, costochondritis, anxiety, ACS, pneumothorax, pneumonia.  PE ruled out by PERC    Problems Addressed:  Chest pain: acute illness or injury    Amount and/or Complexity of Data Reviewed  Labs: ordered. Decision-making details documented in ED Course.  Radiology: ordered and independent interpretation performed.     Details: Chest x-ray within normal limits no pneumonia no pneumothorax  ECG/medicine tests: ordered and independent interpretation performed. Decision-making details documented in ED Course.    Risk  OTC drugs.  Prescription drug management.             Medications   acetaminophen (Ofirmev) injection 1,000 mg (0 mg Intravenous Stopped 4/21/25 2118)       ED Risk Strat Scores   HEART Risk Score      Flowsheet Row Most Recent Value   Heart Score Risk Calculator    History 1 Filed at: 04/21/2025 2116   ECG 0 Filed at: 04/21/2025 2116   Age 0 Filed at: 04/21/2025 2116   Risk Factors 0 Filed at: 04/21/2025 2116   Troponin 0 Filed at: 04/21/2025 2116   HEART Score 1 Filed at: 04/21/2025 2116          HEART Risk Score      Flowsheet Row Most Recent Value   Heart Score Risk Calculator    History 1 Filed at: 04/21/2025 2116   ECG 0 Filed at: 04/21/2025 2116   Age 0 Filed at: 04/21/2025 2116   Risk Factors 0 Filed at: 04/21/2025 2116   Troponin 0 Filed at: 04/21/2025 2116   HEART Score 1 Filed at:  "04/21/2025 2116                      No data recorded    PERC Rule for PE      Flowsheet Row Most Recent Value   PERC Rule for PE    Age >=50 0 Filed at: 04/21/2025 2115   HR >=100 0 Filed at: 04/21/2025 2115   O2 Sat on room air < 95% 0 Filed at: 04/21/2025 2115   History of PE or DVT 0 Filed at: 04/21/2025 2115   Recent trauma or surgery 0 Filed at: 04/21/2025 2115   Hemoptysis 0 Filed at: 04/21/2025 2115   Exogenous estrogen 0 Filed at: 04/21/2025 2115   Unilateral leg swelling 0 Filed at: 04/21/2025 2115   PERC Rule for PE Results 0 Filed at: 04/21/2025 2115                                History of Present Illness       Chief Complaint   Patient presents with    Chest Pain     Pt reports CP that radiates down left arm, HA that started this morning. No meds pta        Past Medical History:   Diagnosis Date    COVID 01/09/2022    Fever,fatigue    GERD (gastroesophageal reflux disease)     rarely    PONV (postoperative nausea and vomiting)     3/11/21 Pt states \"deals with slight nausea after anesthesia.\"      Past Surgical History:   Procedure Laterality Date    ANKLE SURGERY Bilateral     ARTHROSCOPY KNEE Right 3/1/2022    Procedure: RIGHT KNEE ARTHROSCOPY, ARTHROSCOPIC REMOVAL OF HARDWARE;  Surgeon: Kyle Perdue DO;  Location:  MAIN OR;  Service: Orthopedics    CARPAL TUNNEL RELEASE Right     FL INJECTION RIGHT KNEE (ARTHROGRAM)  7/21/2021    FL INJECTION RIGHT KNEE (ARTHROGRAM)  8/24/2022    KNEE ARTHROSCOPY Right     KNEE SURGERY Right     [place] ARTHROSCOPY KNEE W/MENISCUS RPR MEDIAL/LATERAL Right 3/16/2021    Procedure: ARTHROSCOPIC LATERAL MENISCUS REPAIR;  Surgeon: Kyle Perdue DO;  Location:  MAIN OR;  Service: Orthopedics    NY ARTHRS KNE SURG W/MENISCECTOMY MED/LAT W/SHVG Right 10/25/2022    Procedure: MENISCECTOMY LATERAL;  Surgeon: Kyle Perdue DO;  Location:  MAIN OR;  Service: Orthopedics    NY FASCIOTOMY ILIOTIBIAL OPEN Right 10/25/2022    Procedure: RELEASE ILIOTIBIAL BAND;  Surgeon: " Kyle Perdue DO;  Location:  MAIN OR;  Service: Orthopedics    WI NEUROPLASTY &/TRANSPOS MEDIAN NRV CARPAL TUNNE Right 9/20/2018    Procedure: RELEASE CARPAL TUNNEL;  Surgeon: Ruel Campbell MD;  Location:  MAIN OR;  Service: Orthopedics    SYNOVECTOMY Right 9/20/2018    Procedure: TENOSYNOVECTOMY Flexor digitorum superficialis to index, long, ring and small finger; Flexor digitorum profundus to index, long, ring and small finger; Flexor pollicis longus;  Surgeon: Ruel Campbell MD;  Location:  MAIN OR;  Service: Orthopedics    TONSILLECTOMY AND ADENOIDECTOMY        Family History   Problem Relation Age of Onset    Arthritis Mother     No Known Problems Father       Social History     Tobacco Use    Smoking status: Never     Passive exposure: Never    Smokeless tobacco: Never    Tobacco comments:     Denies   Vaping Use    Vaping status: Never Used   Substance Use Topics    Alcohol use: Yes     Alcohol/week: 3.0 standard drinks of alcohol     Types: 3 Cans of beer per week     Comment: social    Drug use: No     Comment: denies      E-Cigarette/Vaping    E-Cigarette Use Never User       E-Cigarette/Vaping Substances    Nicotine No     THC No     CBD No     Flavoring No     Other No     Unknown No       I have reviewed and agree with the history as documented.     34-year-old male comes in for evaluation of chest pain.  Patient states chest pain has been intermittent for the last week.  Patient states however over the last 24 hours it has become more constant.  Patient states he noticed it when he was under stress at work today with the pain became more severe and now has pain that radiates down his left arm.  No known injury.  Denies any pleuritic chest pain no PE risk factors.  No previous similar episodes.  Patient also complains of mild dull headache that is diffuse similar to previous headaches.      History provided by:  Patient and medical records   used: No    Chest  Pain  Pain location:  L chest  Pain quality: dull and radiating    Pain radiates to:  L arm  Pain radiates to the back: no    Pain severity:  Moderate  Onset quality:  Gradual  Duration:  1 week  Timing:  Intermittent  Progression:  Waxing and waning  Chronicity:  New  Context: stress    Worsened by:  Exertion  Ineffective treatments:  None tried  Associated symptoms: no abdominal pain, no anorexia, no cough, no dizziness, no fever, no headache, no heartburn, no lower extremity edema, no palpitations and no shortness of breath    Risk factors: no aortic disease, no high cholesterol, no hypertension and no smoking        Review of Systems   Constitutional:  Negative for activity change, appetite change and fever.   HENT:  Negative for congestion and facial swelling.    Eyes:  Negative for discharge and redness.   Respiratory:  Negative for cough, shortness of breath and wheezing.    Cardiovascular:  Positive for chest pain. Negative for palpitations and leg swelling.   Gastrointestinal:  Negative for abdominal distention, abdominal pain, anorexia, blood in stool and heartburn.   Endocrine: Negative for cold intolerance and polydipsia.   Genitourinary:  Negative for difficulty urinating and hematuria.   Musculoskeletal:  Negative for arthralgias and gait problem.   Skin:  Negative for color change and rash.   Allergic/Immunologic: Negative for food allergies and immunocompromised state.   Neurological:  Negative for dizziness, seizures and headaches.   Hematological:  Negative for adenopathy. Does not bruise/bleed easily.   Psychiatric/Behavioral:  Negative for agitation, confusion and decreased concentration.    All other systems reviewed and are negative.          Objective       ED Triage Vitals   Temperature Pulse Blood Pressure Respirations SpO2 Patient Position - Orthostatic VS   04/21/25 2149 04/21/25 2046 04/21/25 2046 04/21/25 2046 04/21/25 2046 04/21/25 2046   98.4 °F (36.9 °C) 92 142/100 18 99 % Lying       Temp Source Heart Rate Source BP Location FiO2 (%) Pain Score    04/21/25 2046 04/21/25 2046 04/21/25 2046 -- 04/21/25 2046    Oral Monitor Left arm  4      Vitals      Date and Time Temp Pulse SpO2 Resp BP Pain Score FACES Pain Rating User   04/21/25 2330 -- 77 98 % 17 146/84 -- -- AF   04/21/25 2230 -- 75 95 % -- 130/96 -- -- AF   04/21/25 2149 98.4 °F (36.9 °C) -- -- -- -- -- -- AF   04/21/25 2130 -- 86 97 % 18 139/81 -- -- AF   04/21/25 2046 -- 92 99 % 18 142/100 4 -- RN            Physical Exam  Vitals and nursing note reviewed.   Constitutional:       Appearance: He is well-developed. He is not toxic-appearing.   HENT:      Head: Normocephalic and atraumatic.      Right Ear: Tympanic membrane normal.      Left Ear: Tympanic membrane normal.      Nose: Nose normal.   Eyes:      General: Lids are normal.      Conjunctiva/sclera: Conjunctivae normal.      Pupils: Pupils are equal, round, and reactive to light.   Neck:      Vascular: No carotid bruit or JVD.      Trachea: Trachea normal.   Cardiovascular:      Rate and Rhythm: Normal rate and regular rhythm. No extrasystoles are present.     Heart sounds: Normal heart sounds.   Pulmonary:      Effort: Pulmonary effort is normal.      Breath sounds: No decreased breath sounds, wheezing, rhonchi or rales.   Chest:      Chest wall: No deformity or tenderness.   Abdominal:      General: Bowel sounds are normal.      Palpations: Abdomen is soft.      Tenderness: There is no abdominal tenderness. There is no guarding or rebound.   Musculoskeletal:      Right shoulder: No swelling, deformity or tenderness. Normal range of motion.      Cervical back: Normal range of motion and neck supple. No deformity, tenderness or bony tenderness.   Lymphadenopathy:      Cervical: No cervical adenopathy.   Skin:     General: Skin is warm and dry.   Neurological:      Mental Status: He is alert and oriented to person, place, and time.      Cranial Nerves: No cranial nerve deficit.       Sensory: No sensory deficit.      Deep Tendon Reflexes: Reflexes are normal and symmetric.   Psychiatric:         Speech: Speech normal.         Behavior: Behavior normal.         Thought Content: Thought content normal.         Judgment: Judgment normal.         Results Reviewed       Procedure Component Value Units Date/Time    HS Troponin I 2hr [[id number]] Collected: 04/21/25 2252    Lab Status: Final result Specimen: Blood from Arm, Right Updated: 04/21/25 2323     hs TnI 2hr <2 ng/L      Delta 2hr hsTnI --    TSH, 3rd generation with Free T4 reflex [[id number]]  (Normal) Collected: 04/21/25 2102    Lab Status: Final result Specimen: Blood from Arm, Right Updated: 04/21/25 2140     TSH 3RD GENERATON 2.529 uIU/mL     HS Troponin 0hr (reflex protocol) [[id number]]  (Normal) Collected: 04/21/25 2102    Lab Status: Final result Specimen: Blood from Arm, Right Updated: 04/21/25 2130     hs TnI 0hr <2 ng/L     Comprehensive metabolic panel [[id number]]  (Abnormal) Collected: 04/21/25 2102    Lab Status: Final result Specimen: Blood from Arm, Right Updated: 04/21/25 2125     Sodium 141 mmol/L      Potassium 3.5 mmol/L      Chloride 100 mmol/L      CO2 31 mmol/L      ANION GAP 10 mmol/L      BUN 15 mg/dL      Creatinine 0.94 mg/dL      Glucose 108 mg/dL      Calcium 9.9 mg/dL      AST 23 U/L      ALT 26 U/L      Alkaline Phosphatase 63 U/L      Total Protein 7.6 g/dL      Albumin 5.1 g/dL      Total Bilirubin 1.89 mg/dL      eGFR 106 ml/min/1.73sq m     Narrative:      National Kid[employer] guidelines for Chronic Kidney Disease (CKD):     Stage 1 with normal or high GFR (GFR > 90 mL/min/1.73 square meters)    Stage 2 Mild CKD (GFR = 60-89 mL/min/1.73 square meters)    Stage 3A Moderate CKD (GFR = 45-59 mL/min/1.73 square meters)    Stage 3B Moderate CKD (GFR = 30-44 mL/min/1.73 square meters)    Stage 4 Severe CKD (GFR = 15-29 mL/min/1.73 square meters)    Stage 5 End Stage CKD (GFR <15 mL/min/1.73 square  meters)  Note: GFR calculation is accurate only with a steady state creatinine    Magnesium [[id number]]  (Normal) Collected: 04/21/25 2102    Lab Status: Final result Specimen: Blood from Arm, Right Updated: 04/21/25 2125     Magnesium 2.2 mg/dL     CBC and differential [[id number]]  (Abnormal) Collected: 04/21/25 2102    Lab Status: Final result Specimen: Blood from Arm, Right Updated: 04/21/25 2108     WBC 7.15 Thousand/uL      RBC 5.00 Million/uL      Hemoglobin 14.9 g/dL      Hematocrit 43.1 %      MCV 86 fL      MCH 29.8 pg      MCHC 34.6 g/dL      RDW 12.8 %      MPV 9.2 fL      Platelets 279 Thousands/uL      nRBC 0 /100 WBCs      Segmented % 43 %      Immature Grans % 0 %      Lymphocytes % 47 %      Monocytes % 8 %      Eosinophils Relative 1 %      Basophils Relative 1 %      Absolute Neutrophils 3.05 Thousands/µL      Absolute Immature Grans 0.01 Thousand/uL      Absolute Lymphocytes 3.34 Thousands/µL      Absolute Monocytes 0.60 Thousand/µL      Eosinophils Absolute 0.10 Thousand/µL      Basophils Absolute 0.05 Thousands/µL             XR chest 1 view portable   Final Interpretation by Jody Mueller MD (04/22 [id number])      No acute cardiopulmonary disease.            Workstation performed: [id number]             ECG 12 Lead Documentation Only    Date/Time: 4/21/2025 8:47 PM    Performed by: Pippa Padilla DO  Authorized by: Pippa Padilla DO    Rhythm:     Rhythm: sinus rhythm    Comments:      Sinus arrhythmia      ED Medication and Procedure Management   Prior to Admission Medications   Prescriptions Last Dose Informant Patient Reported? Taking?   DAILY MULTIPLE VITAMINS PO   Yes No   Sig: Take by mouth daily   cyclobenzaprine (FLEXERIL) 10 mg tablet   No No   Sig: Take 1 tablet (10 mg total) by mouth 3 (three) times a day as needed for muscle spasms for up to 5 days   cyclobenzaprine (FLEXERIL) 10 mg tablet   No Yes   Sig: Take 1 tablet (10 mg total) by mouth 3 (three) times a day as  needed for muscle spasms for up to 5 days   fluticasone (FLONASE) 50 mcg/act nasal spray   No No   Si spray into each nostril daily   Patient not taking: Reported on 2024   ondansetron (ZOFRAN) 4 mg tablet   No No   Sig: Take 1 tablet (4 mg total) by mouth every 8 (eight) hours as needed for nausea or vomiting      Facility-Administered Medications: None     Discharge Medication List as of 2025 11:26 PM        START taking these medications    Details   acetaminophen (TYLENOL) 325 mg tablet Take 2 tablets (650 mg total) by mouth every 6 (six) hours as needed for mild pain or moderate pain, Starting Mon 2025, Normal           CONTINUE these medications which have CHANGED    Details   cyclobenzaprine (FLEXERIL) 10 mg tablet Take 1 tablet (10 mg total) by mouth 3 (three) times a day as needed for muscle spasms for up to 5 days, Starting Mon 2025, Until Sat 2025 at 2359, Normal           CONTINUE these medications which have NOT CHANGED    Details   DAILY MULTIPLE VITAMINS PO Take by mouth daily, Historical Med      fluticasone (FLONASE) 50 mcg/act nasal spray 1 spray into each nostril daily, Starting Thu 10/19/2023, Normal      ondansetron (ZOFRAN) 4 mg tablet Take 1 tablet (4 mg total) by mouth every 8 (eight) hours as needed for nausea or vomiting, Starting Mon 2024, Normal           No discharge procedures on file.  ED SEPSIS DOCUMENTATION   Time reflects when diagnosis was documented in both MDM as applicable and the Disposition within this note       Time User Action Codes Description Comment    2025 11:25 PM Pippa Padilla Add [R07.9] Chest pain     2025 11:25 PM Pippa Padilla Add [S39.012A] Low back strain, initial encounter                  Pippa Padilla DO  [id number]

## 2025-04-23 LAB
ATRIAL RATE: 99 BPM
P AXIS: 76 DEGREES
PR INTERVAL: 128 MS
QRS AXIS: 80 DEGREES
QRSD INTERVAL: 92 MS
QT INTERVAL: 338 MS
QTC INTERVAL: 433 MS
T WAVE AXIS: 9 DEGREES
VENTRICULAR RATE: 99 BPM

## 2025-04-23 PROCEDURE — 93010 ELECTROCARDIOGRAM REPORT: CPT | Performed by: INTERNAL MEDICINE

## 2025-08-19 ENCOUNTER — TELEMEDICINE (OUTPATIENT)
Dept: OTHER | Facility: HOSPITAL | Age: 35
End: 2025-08-19
Payer: COMMERCIAL

## 2025-08-19 VITALS — HEART RATE: 84 BPM | WEIGHT: 192 LBS | BODY MASS INDEX: 26.88 KG/M2 | HEIGHT: 71 IN

## 2025-08-19 DIAGNOSIS — M62.830 SPASM OF LEFT TRAPEZIUS MUSCLE: Primary | ICD-10-CM

## 2025-08-19 PROBLEM — Z48.89 AFTERCARE FOLLOWING SURGERY: Status: RESOLVED | Noted: 2018-09-28 | Resolved: 2025-08-19

## 2025-08-19 PROBLEM — S60.222A CONTUSION OF LEFT HAND, INITIAL ENCOUNTER: Status: RESOLVED | Noted: 2024-03-15 | Resolved: 2025-08-19

## 2025-08-19 PROBLEM — K21.9 GASTROESOPHAGEAL REFLUX DISEASE: Status: RESOLVED | Noted: 2022-03-01 | Resolved: 2025-08-19

## 2025-08-19 PROBLEM — M65.931 TENOSYNOVITIS OF RIGHT WRIST: Status: RESOLVED | Noted: 2018-09-07 | Resolved: 2025-08-19

## 2025-08-19 PROBLEM — S93.492A SPRAIN OF ANTERIOR TALOFIBULAR LIGAMENT OF LEFT ANKLE: Status: RESOLVED | Noted: 2020-02-27 | Resolved: 2025-08-19

## 2025-08-19 PROBLEM — S62.624A CLOSED DISPLACED FRACTURE OF MIDDLE PHALANX OF RIGHT RING FINGER: Status: RESOLVED | Noted: 2019-09-27 | Resolved: 2025-08-19

## 2025-08-19 PROCEDURE — 99213 OFFICE O/P EST LOW 20 MIN: CPT | Performed by: PHYSICIAN ASSISTANT

## 2025-08-19 RX ORDER — CYCLOBENZAPRINE HCL 10 MG
10 TABLET ORAL 3 TIMES DAILY PRN
Qty: 15 TABLET | Refills: 0 | Status: SHIPPED | OUTPATIENT
Start: 2025-08-19 | End: 2025-08-24

## (undated) DEVICE — GLOVE INDICATOR PI UNDERGLOVE SZ 7 BLUE

## (undated) DEVICE — SUT VICRYL 3-0 SH 27 IN J416H

## (undated) DEVICE — 3M™ IOBAN™ 2 ANTIMICROBIAL INCISE DRAPE 6650EZ: Brand: IOBAN™ 2

## (undated) DEVICE — ARTHROSCOPY FLOOR MAT

## (undated) DEVICE — DRAPE SHEET THREE QUARTER

## (undated) DEVICE — GLOVE SRG LF STRL BGL SKNSNS 6.5 PF

## (undated) DEVICE — STERILE POLYISOPRENE POWDER-FREE SURGICAL GLOVES WITH EMOLLIENT COATING: Brand: PROTEXIS

## (undated) DEVICE — LIGHT GLOVE GREEN

## (undated) DEVICE — TIBURON EXTREMITY DRAPE WITH ARMBOARD COVERS: Brand: CONVERTORS

## (undated) DEVICE — INTENDED FOR TISSUE SEPARATION, AND OTHER PROCEDURES THAT REQUIRE A SHARP SURGICAL BLADE TO PUNCTURE OR CUT.: Brand: BARD-PARKER ® SAFETYLOCK CARBON RIB-BACK BLADES

## (undated) DEVICE — COBAN 4 IN STERILE

## (undated) DEVICE — CUFF TOURNIQUET 30 X 4 IN QUICK CONNECT DISP 1BLA

## (undated) DEVICE — STOCKINETTE,IMPERVIOUS,12X48,STERILE: Brand: MEDLINE

## (undated) DEVICE — SPONGE LAP 18 X 18 IN STRL RFD

## (undated) DEVICE — GLOVE SRG LF STRL BGL SKNSNS 8.5 PF

## (undated) DEVICE — SYRINGE 3ML LL

## (undated) DEVICE — INTENDED FOR TISSUE SEPARATION, AND OTHER PROCEDURES THAT REQUIRE A SHARP SURGICAL BLADE TO PUNCTURE OR CUT.: Brand: BARD-PARKER SAFETY BLADES SIZE 15, STERILE

## (undated) DEVICE — CHLORAPREP HI-LITE 26ML ORANGE

## (undated) DEVICE — SYRINGE 30ML LL

## (undated) DEVICE — BULB SYRINGE,IRRIGATION WITH PROTECTIVE CAP: Brand: DOVER

## (undated) DEVICE — 4-PORT MANIFOLD: Brand: NEPTUNE 2

## (undated) DEVICE — PADDING CAST 6IN COTTON STRL

## (undated) DEVICE — BLADE SHAVER TORPEDO 4MM 13CM  COOLCUT

## (undated) DEVICE — NEEDLE FILTER 5 MICR 19G X 1.5IN

## (undated) DEVICE — ABDOMINAL PAD: Brand: DERMACEA

## (undated) DEVICE — BETHLEHEM UNIVERSAL  MIONR EXT: Brand: CARDINAL HEALTH

## (undated) DEVICE — ACE WRAP 6 IN UNSTERILE

## (undated) DEVICE — WEBRIL 6 IN UNSTERILE

## (undated) DEVICE — NEEDLE BLUNT 18 G X 1 1/2IN

## (undated) DEVICE — BLADE SHAVER DISSECTOR 4MM 13CM COOLCUT

## (undated) DEVICE — 3M™ STERI-STRIP™ REINFORCED ADHESIVE SKIN CLOSURES, R1547, 1/2 IN X 4 IN (12 MM X 100 MM), 6 STRIPS/ENVELOPE: Brand: 3M™ STERI-STRIP™

## (undated) DEVICE — STIRRUP STRAP ADULT DISP

## (undated) DEVICE — BETHLEHEM UNIVERSAL  ARTHRO PK: Brand: CARDINAL HEALTH

## (undated) DEVICE — GLOVE INDICATOR PI UNDERGLOVE SZ 8 BLUE

## (undated) DEVICE — 3M™ STERI-DRAPE™ U-DRAPE 1015: Brand: STERI-DRAPE™

## (undated) DEVICE — SINGLE PORT MANIFOLD: Brand: NEPTUNE 2

## (undated) DEVICE — TUBING ARTHROSCOPIC WAVE  MAIN PUMP

## (undated) DEVICE — YANKAUER,OPEN TIP, NO VENT: Brand: ARGYLE

## (undated) DEVICE — STERILE BETHLEHEM PLASTIC HAND: Brand: CARDINAL HEALTH

## (undated) DEVICE — FAST-FIX 360 STRAIGHT KNOT                                    PUSHER/CUTTER AND SLOTTED CANNULA SETS: Brand: FAST-FIX

## (undated) DEVICE — GAUZE SPONGES,16 PLY: Brand: CURITY

## (undated) DEVICE — STERILE POLYISOPRENE POWDER-FREE SURGICAL GLOVES: Brand: PROTEXIS

## (undated) DEVICE — DISPOSABLE OR TOWEL: Brand: CARDINAL HEALTH

## (undated) DEVICE — PENCIL SMOKE EVAC TELESCOPING W/TUBING

## (undated) DEVICE — SURGICAL CLIPPER BLADE GENERAL USE

## (undated) DEVICE — TUBING SUCTION 5MM X 12 FT

## (undated) DEVICE — SUT MONOCRYL 4-0 PS-2 27 IN Y426H

## (undated) DEVICE — GLOVE SRG BIOGEL 7.5

## (undated) DEVICE — OCCLUSIVE GAUZE STRIP,3% BISMUTH TRIBROMOPHENATE IN PETROLATUM BLEND: Brand: XEROFORM

## (undated) DEVICE — SCD SEQUENTIAL COMPRESSION COMFORT SLEEVE MEDIUM KNEE LENGTH: Brand: KENDALL SCD

## (undated) DEVICE — NEEDLE 25G X 1 1/2

## (undated) DEVICE — SUT PROLENE 4-0 PC-3 18 IN 8634G

## (undated) DEVICE — SURGICAL GOWN, XL SMARTSLEEVE: Brand: CONVERTORS

## (undated) DEVICE — GLOVE SRG DERMASSURE SZ 7

## (undated) DEVICE — GLOVE SRG BIOGEL 7

## (undated) DEVICE — SUT VICRYL 2-0 CT-1 36 IN J945H

## (undated) DEVICE — CUFF TOURNIQUET 24 X 4 IN QUICK CONNECT DISP 1BLA

## (undated) DEVICE — SPONGE PVP SCRUB WING STERILE